# Patient Record
Sex: MALE | Race: WHITE | NOT HISPANIC OR LATINO | Employment: FULL TIME | URBAN - METROPOLITAN AREA
[De-identification: names, ages, dates, MRNs, and addresses within clinical notes are randomized per-mention and may not be internally consistent; named-entity substitution may affect disease eponyms.]

---

## 2017-01-06 ENCOUNTER — OFFICE VISIT (OUTPATIENT)
Dept: FAMILY MEDICINE | Facility: CLINIC | Age: 47
End: 2017-01-06
Payer: COMMERCIAL

## 2017-01-06 ENCOUNTER — TELEPHONE (OUTPATIENT)
Dept: FAMILY MEDICINE | Facility: CLINIC | Age: 47
End: 2017-01-06

## 2017-01-06 VITALS
DIASTOLIC BLOOD PRESSURE: 86 MMHG | HEART RATE: 78 BPM | SYSTOLIC BLOOD PRESSURE: 119 MMHG | OXYGEN SATURATION: 96 % | HEIGHT: 70 IN | TEMPERATURE: 98.2 F | WEIGHT: 266.6 LBS | BODY MASS INDEX: 38.17 KG/M2

## 2017-01-06 DIAGNOSIS — E11.22 TYPE 2 DIABETES MELLITUS WITH STAGE 2 CHRONIC KIDNEY DISEASE, WITHOUT LONG-TERM CURRENT USE OF INSULIN (H): Primary | ICD-10-CM

## 2017-01-06 DIAGNOSIS — E11.9 TYPE 2 DIABETES MELLITUS WITHOUT COMPLICATION, WITHOUT LONG-TERM CURRENT USE OF INSULIN (H): ICD-10-CM

## 2017-01-06 DIAGNOSIS — I10 BENIGN ESSENTIAL HYPERTENSION: ICD-10-CM

## 2017-01-06 DIAGNOSIS — E78.5 HYPERLIPIDEMIA LDL GOAL <100: ICD-10-CM

## 2017-01-06 DIAGNOSIS — N18.2 TYPE 2 DIABETES MELLITUS WITH STAGE 2 CHRONIC KIDNEY DISEASE, WITHOUT LONG-TERM CURRENT USE OF INSULIN (H): Primary | ICD-10-CM

## 2017-01-06 DIAGNOSIS — Z23 NEED FOR PROPHYLACTIC VACCINATION AGAINST STREPTOCOCCUS PNEUMONIAE (PNEUMOCOCCUS): ICD-10-CM

## 2017-01-06 DIAGNOSIS — Z23 NEED FOR PROPHYLACTIC VACCINATION AND INOCULATION AGAINST INFLUENZA: ICD-10-CM

## 2017-01-06 DIAGNOSIS — Z13.89 SCREENING FOR DIABETIC PERIPHERAL NEUROPATHY: ICD-10-CM

## 2017-01-06 LAB
ANION GAP SERPL CALCULATED.3IONS-SCNC: 11 MMOL/L (ref 3–14)
BUN SERPL-MCNC: 18 MG/DL (ref 7–30)
CALCIUM SERPL-MCNC: 9.5 MG/DL (ref 8.5–10.1)
CHLORIDE SERPL-SCNC: 102 MMOL/L (ref 94–109)
CHOLEST SERPL-MCNC: 162 MG/DL
CO2 SERPL-SCNC: 25 MMOL/L (ref 20–32)
CREAT SERPL-MCNC: 1.14 MG/DL (ref 0.66–1.25)
CREAT UR-MCNC: 261 MG/DL
GFR SERPL CREATININE-BSD FRML MDRD: 69 ML/MIN/1.7M2
GLUCOSE SERPL-MCNC: 189 MG/DL (ref 70–99)
HBA1C MFR BLD: 8.5 % (ref 4.3–6)
HDLC SERPL-MCNC: 49 MG/DL
LDLC SERPL CALC-MCNC: 80 MG/DL
MICROALBUMIN UR-MCNC: 14 MG/L
MICROALBUMIN/CREAT UR: 5.17 MG/G CR (ref 0–17)
NONHDLC SERPL-MCNC: 113 MG/DL
POTASSIUM SERPL-SCNC: 4.5 MMOL/L (ref 3.4–5.3)
SODIUM SERPL-SCNC: 138 MMOL/L (ref 133–144)
TRIGL SERPL-MCNC: 165 MG/DL

## 2017-01-06 PROCEDURE — 99207 C FOOT EXAM  NO CHARGE: CPT | Performed by: PHYSICIAN ASSISTANT

## 2017-01-06 PROCEDURE — 83036 HEMOGLOBIN GLYCOSYLATED A1C: CPT | Performed by: PHYSICIAN ASSISTANT

## 2017-01-06 PROCEDURE — 99214 OFFICE O/P EST MOD 30 MIN: CPT | Mod: 25 | Performed by: PHYSICIAN ASSISTANT

## 2017-01-06 PROCEDURE — 90686 IIV4 VACC NO PRSV 0.5 ML IM: CPT | Performed by: PHYSICIAN ASSISTANT

## 2017-01-06 PROCEDURE — 82043 UR ALBUMIN QUANTITATIVE: CPT | Performed by: PHYSICIAN ASSISTANT

## 2017-01-06 PROCEDURE — 90471 IMMUNIZATION ADMIN: CPT | Performed by: PHYSICIAN ASSISTANT

## 2017-01-06 PROCEDURE — 80048 BASIC METABOLIC PNL TOTAL CA: CPT | Performed by: PHYSICIAN ASSISTANT

## 2017-01-06 PROCEDURE — 36415 COLL VENOUS BLD VENIPUNCTURE: CPT | Performed by: PHYSICIAN ASSISTANT

## 2017-01-06 PROCEDURE — 80061 LIPID PANEL: CPT | Performed by: PHYSICIAN ASSISTANT

## 2017-01-06 RX ORDER — ATORVASTATIN CALCIUM 10 MG/1
20 TABLET, FILM COATED ORAL DAILY
Qty: 180 TABLET | Refills: 0 | Status: CANCELLED | OUTPATIENT
Start: 2017-01-06

## 2017-01-06 RX ORDER — GLIPIZIDE 10 MG/1
TABLET ORAL
Qty: 135 TABLET | Refills: 1 | Status: SHIPPED | OUTPATIENT
Start: 2017-01-06 | End: 2017-01-06

## 2017-01-06 RX ORDER — LISINOPRIL 10 MG/1
10 TABLET ORAL DAILY
Qty: 90 TABLET | Refills: 1 | Status: SHIPPED | OUTPATIENT
Start: 2017-01-06 | End: 2017-06-15

## 2017-01-06 RX ORDER — LISINOPRIL 10 MG/1
10 TABLET ORAL DAILY
Qty: 30 TABLET | Refills: 0 | Status: CANCELLED | OUTPATIENT
Start: 2017-01-06

## 2017-01-06 RX ORDER — GLIPIZIDE 10 MG/1
TABLET ORAL
Qty: 135 TABLET | Refills: 1 | Status: SHIPPED | OUTPATIENT
Start: 2017-01-06 | End: 2017-06-27

## 2017-01-06 RX ORDER — GLIPIZIDE 10 MG/1
TABLET ORAL
Qty: 135 TABLET | Refills: 1 | Status: CANCELLED | OUTPATIENT
Start: 2017-01-06

## 2017-01-06 NOTE — MR AVS SNAPSHOT
After Visit Summary   1/6/2017    Paul Ortiz    MRN: [id number]           Patient Information     Date Of Birth          1970        Visit Information        Provider Department      1/6/2017 7:00 AM Dominik Thomas PA-C [facility]        Today's Diagnoses     Type 2 diabetes mellitus with stage 2 chronic kidney disease, without long-term current use of insulin (H)    -  1     Screening for diabetic peripheral neuropathy         Need for prophylactic vaccination and inoculation against influenza         Need for prophylactic vaccination against Streptococcus pneumoniae (pneumococcus)         Benign essential hypertension         Hyperlipidemia LDL goal <100         Type 2 diabetes mellitus without complication, without long-term current use of insulin (H)            Follow-ups after your visit        Who to contact     Normal or non-critical lab and imaging results will be communicated to you by [employer], letter or phone within 4 business days after the clinic has received the results. If you do not hear from us within 7 days, please contact the clinic through [employer] or phone. If you have a critical or abnormal lab result, we will notify you by phone as soon as possible.  Submit refill requests through [employer] or call your pharmacy and they will forward the refill request to us. Please allow 3 business days for your refill to be completed.          If you need to speak with a  for additional information , please call: [phone]             Additional Information About Your Visit        [employer] Information     [employer] gives you secure access to your electronic health record. If you see a primary care provider, you can also send messages to your care team and make appointments. If you have questions, please call your primary care clinic.  If you do not have a primary care provider, please call [phone] and they will assist you.        Care EveryWhere ID   "   This is your Care EveryWhere ID. This could be used by other organizations to access your [facility] medical records  [id number]        Your Vitals Were     Pulse Temperature Height BMI (Body Mass Index) Pulse Oximetry       78 98.2  F (36.8  C) (Oral) 5' 10\" (1.778 m) 38.25 kg/m2 96%        Blood Pressure from Last 3 Encounters:   01/06/17 119/86   06/30/16 108/79   12/29/15 130/80    Weight from Last 3 Encounters:   01/06/17 266 lb 9.6 oz (120.929 kg)   06/30/16 268 lb (121.564 kg)   12/29/15 267 lb 6 oz (121.281 kg)              We Performed the Following     Albumin Random Urine Quantitative     Basic metabolic panel  (Ca, Cl, CO2, Creat, Gluc, K, Na, BUN)     FLU VAC, SPLIT VIRUS IM > 3 YO (QUADRIVALENT) [17194]     FOOT EXAM  NO CHARGE [96470.114]     HEMOGLOBIN A1C     Lipid panel reflex to direct LDL     Vaccine Administration, Initial [54061]          Today's Medication Changes          These changes are accurate as of: 1/6/17  7:47 AM.  If you have any questions, ask your nurse or doctor.               Start taking these medicines.        Dose/Directions    glipiZIDE 10 MG tablet   Commonly known as:  GLUCOTROL   Used for:  Type 2 diabetes mellitus without complication, without long-term current use of insulin (H)   Started by:  Dominik Thomas PA-C        Take 1 tab in the morning and 1 tab prior to supper   Quantity:  135 tablet   Refills:  1       insulin degludec 100 UNIT/ML pen   Commonly known as:  TRESIBA   Used for:  Type 2 diabetes mellitus without complication, without long-term current use of insulin (H)   Started by:  Dominik Thomas PA-C        Dose:  15 Units   Inject 15 Units Subcutaneous daily   Quantity:  15 mL   Refills:  3       insulin pen needle 31G X 6 MM   Used for:  Type 2 diabetes mellitus without complication, without long-term current use of insulin (H)   Started by:  Dominik Thomas PA-C        Use once  daily or as directed.   Quantity:  100 each   Refills:  11    "         Where to get your medicines      These medications were sent to [facility]/pharm[facility] #[id number] - [place], [place] - [address]  [address], [place] [address]     Phone:  [phone]    - dulaglutide 1.5 MG/0.5ML pen  - glipiZIDE 10 MG tablet  - insulin degludec 100 UNIT/ML pen  - insulin pen needle 31G X 6 MM  - lisinopril 10 MG tablet  - metFORMIN 1000 MG tablet      Some of these will need a paper prescription and others can be bought over the counter.  Ask your nurse if you have questions.     Bring a paper prescription for each of these medications    - order for DME             Primary Care Provider Office Phone # Fax #    Dominik Thomas PA-C [phone]       [facility] [address] [facility] [address]        Thank you!     Thank you for choosing [facility]  for your care. Our goal is always to provide you with excellent care. Hearing back from our patients is one way we can continue to improve our services. Please take a few minutes to complete the written survey that you may receive in the mail after your visit with us. Thank you!             Your Updated Medication List - Protect others around you: Learn how to safely use, store and throw away your medicines at www.disposemymeds.org.          This list is accurate as of: 1/6/17  7:47 AM.  Always use your most recent med list.                   Brand Name Dispense Instructions for use    aspirin 81 MG tablet     30 tablet    Take 1 tablet by mouth daily.       atorvastatin 10 MG tablet    LIPITOR    180 tablet    Take 2 tablets (20 mg) by mouth daily       dulaglutide 1.5 MG/0.5ML pen    TRULICITY    6 mL    Inject 1.5 mg Subcutaneous every 7 days       glipiZIDE 10 MG tablet    GLUCOTROL    135 tablet    Take 1 tab in the morning and 1 tab prior to supper       insulin degludec 100 UNIT/ML pen    TRESIBA    15 mL    Inject 15 Units Subcutaneous daily       insulin pen needle 31G X 6 MM      100 each    Use once  daily or as directed.       lisinopril 10 MG tablet    PRINIVIL/ZESTRIL    90 tablet    Take 1 tablet (10 mg) by mouth daily       metFORMIN 1000 MG tablet    GLUCOPHAGE    180 tablet    Take 1 tablet (1,000 mg) by mouth 2 times daily (with meals)       * order for DME     100 each    Lancets.  Daily and prn.       * order for DME     200 each    Test strips for pt's glucometer, brand as covered by insurance Test bid and prn.       * Notice:  This list has 2 medication(s) that are the same as other medications prescribed for you. Read the directions carefully, and ask your doctor or other care provider to review them with you.

## 2017-01-06 NOTE — NURSING NOTE
"Chief Complaint   Patient presents with     Diabetes       Initial /86 mmHg  Pulse 78  Temp(Src) 98.2  F (36.8  C) (Oral)  Ht 5' 10\" (1.778 m)  Wt 266 lb 9.6 oz (120.929 kg)  BMI 38.25 kg/m2  SpO2 96% Estimated body mass index is 38.25 kg/(m^2) as calculated from the following:    Height as of this encounter: 5' 10\" (1.778 m).    Weight as of this encounter: 266 lb 9.6 oz (120.929 kg).  BP completed using cuff size: Hank Garcia MA    "

## 2017-01-06 NOTE — Clinical Note
[facility]  [address] [place] [address] [phone]        January 23, 2017      Paul Ortiz  [address] [place] [address]        Dear Paul,      Your recent cholesterol number and kidney function came back looking good. Make the changes in your diabetes treatment regimen and work on lifestyle changes to improve your blood sugar control and I'll look to see you for a recheck in 2 months or so.    Results for orders placed or performed in visit on 01/06/17   HEMOGLOBIN A1C   Result Value Ref Range    Hemoglobin A1C 8.5 (H) 4.3 - 6.0 %   Lipid panel reflex to direct LDL   Result Value Ref Range    Cholesterol 162 <200 mg/dL    Triglycerides 165 (H) <150 mg/dL    HDL Cholesterol 49 >39 mg/dL    LDL Cholesterol Calculated 80 <100 mg/dL    Non HDL Cholesterol 113 <130 mg/dL   Albumin Random Urine Quantitative   Result Value Ref Range    Creatinine Urine 261 mg/dL    Albumin Urine mg/L 14 mg/L    Albumin Urine mg/g Cr 5.17 0 - 17 mg/g Cr   Basic metabolic panel  (Ca, Cl, CO2, Creat, Gluc, K, Na, BUN)   Result Value Ref Range    Sodium 138 133 - 144 mmol/L    Potassium 4.5 3.4 - 5.3 mmol/L    Chloride 102 94 - 109 mmol/L    Carbon Dioxide 25 20 - 32 mmol/L    Anion Gap 11 3 - 14 mmol/L    Glucose 189 (H) 70 - 99 mg/dL    Urea Nitrogen 18 7 - 30 mg/dL    Creatinine 1.14 0.66 - 1.25 mg/dL    GFR Estimate 69 >60 mL/min/1.7m2    GFR Estimate If Black 83 >60 mL/min/1.7m2    Calcium 9.5 8.5 - 10.1 mg/dL       If you have any questions or concerns, please call myself or my nurse at [phone].    Sincerely,    Dominik Thomas PA-C/keli

## 2017-01-06 NOTE — PROGRESS NOTES
SUBJECTIVE:                                                    Paul Ortiz is a 46 year old male who presents to clinic today for the following health issues:      Diabetes Follow-up    Patient is checking blood sugars: twice daily.  Results are as follows: [id number]         Afternoon- 130-150    Diabetic concerns: None     Symptoms of hypoglycemia (low blood sugar): none     Paresthesias (numbness or burning in feet) or sores: No     Date of last diabetic eye exam: [id number]       Amount of exercise or physical activity: None    Problems taking medications regularly: No    Medication side effects: none    Diet: diabetic    More stress (work related). Less active. Sugars are now running a little higher.    Problem list and histories reviewed & adjusted, as indicated.  Additional history: as documented    Patient Active Problem List   Diagnosis     Gout     Obesity     Health Care Home     Hyperlipidemia LDL goal <100     Type 2 diabetes, HbA1c goal < 7% (H)     Essential hypertension with goal blood pressure less than 140/90     Type 2 diabetes mellitus with stage 2 chronic kidney disease, without long-term current use of insulin (H)     Past Surgical History   Procedure Laterality Date     Hydrocelectomy scrotal  2012     Procedure:HYDROCELECTOMY SCROTAL; Left hydrocelectomy; Surgeon:ROSEANNA BAUTISTA; Location: OR       Social History   Substance Use Topics     Smoking status: Former Smoker -- 1.50 packs/day for 13 years     Types: Cigarettes     Quit date: 1997     Smokeless tobacco: Never Used     Alcohol Use: Yes      Comment: very little     Family History   Problem Relation Age of Onset     DIABETES Father      DIABETES Maternal Grandmother      Hypertension Mother      C.A.D. Maternal Grandfather       at 72, for aortic aneurism and CABGx2         Recent Labs   Lab Test  16   0717  12/29/15   0751  10/24/14   0748  14   1427   13   0729   A1C  7.2*  11.0*  7.0*   7.7*   < >   --    LDL   --   83  99  83   < >  136*   HDL   --   43  47  41   < >  51   TRIG   --   219*  134  103   < >  137   ALT   --    --    --   67   --    --    CR  1.03  0.78  0.98  0.98   --    --    GFRESTIMATED  77  >90  Non  GFR Calc    82  83   --    --    GFRESTBLACK  >90   GFR Calc    >90   GFR Calc    >90   GFR Calc    >90   --    --    POTASSIUM  4.7  4.3  4.7  4.5   --    --    TSH   --   1.78   --    --    --   2.20    < > = values in this interval not displayed.      BP Readings from Last 3 Encounters:   01/06/17 119/86   06/30/16 108/79   12/29/15 130/80    Wt Readings from Last 3 Encounters:   01/06/17 266 lb 9.6 oz (120.929 kg)   06/30/16 268 lb (121.564 kg)   12/29/15 267 lb 6 oz (121.281 kg)                    All other systems negative except as outline above  OBJECTIVE:  Eye exam - right eye normal lid, conjunctiva, cornea, pupil and fundus, left eye normal lid, conjunctiva, cornea, pupil and fundus.  CHEST:chest clear to IPPA, no tachypnea, retractions or cyanosis and S1, S2 normal, no murmur, no gallop, rate regular.  Foot exam - both sides normal; no swelling, tenderness or skin or vascular lesions. Color and temperature is normal. Sensation is intact. Peripheral pulses are palpable. Toenails are normal.  Thyroid not palpable, not enlarged, no nodules detected.    Paul was seen today for diabetes.    Diagnoses and all orders for this visit:    Screening for diabetic peripheral neuropathy  -     HEMOGLOBIN A1C  -     Albumin Random Urine Quantitative    Need for prophylactic vaccination and inoculation against influenza  -     FLU VAC, SPLIT VIRUS IM > 3 YO (QUADRIVALENT) [41029]  -     Vaccine Administration, Initial [85740]    Need for prophylactic vaccination against Streptococcus pneumoniae (pneumococcus)    Type 2 diabetes mellitus with stage 2 chronic kidney disease, without long-term current use of insulin (H)  -      FOOT EXAM  NO CHARGE [90224.114]  -     order for DME; Test strips for pt's glucometer, brand as covered by insurance Test bid and prn.  -     Basic metabolic panel  (Ca, Cl, CO2, Creat, Gluc, K, Na, BUN)  -     dulaglutide (TRULICITY) 1.5 MG/0.5ML pen; Inject 1.5 mg Subcutaneous every 7 days  -     Discontinue: glipiZIDE (GLUCOTROL) 10 MG tablet; Take 1 tab in the morning and 0.5 tab prior to supper    Benign essential hypertension  -     lisinopril (PRINIVIL/ZESTRIL) 10 MG tablet; Take 1 tablet (10 mg) by mouth daily    Hyperlipidemia LDL goal <100    Type 2 diabetes mellitus without complication, without long-term current use of insulin (H)  -     metFORMIN (GLUCOPHAGE) 1000 MG tablet; Take 1 tablet (1,000 mg) by mouth 2 times daily (with meals)  -     insulin degludec (TRESIBA) 100 UNIT/ML pen; Inject 15 Units Subcutaneous daily  -     insulin pen needle 31G X 6 MM; Use once  daily or as directed.  -     glipiZIDE (GLUCOTROL) 10 MG tablet; Take 1 tab in the morning and 1 tab prior to supper      work on lifestyle modification  Recheck in 3 mos.      Injectable Influenza Immunization Documentation    1.  Is the person to be vaccinated sick today?  No    2. Does the person to be vaccinated have an allergy to eggs or to a component of the vaccine?  No    3. Has the person to be vaccinated today ever had a serious reaction to influenza vaccine in the past?  No    4. Has the person to be vaccinated ever had Guillain-Gillespie syndrome?  No     Form completed by tisha toledo

## 2017-01-11 ENCOUNTER — TELEPHONE (OUTPATIENT)
Dept: FAMILY MEDICINE | Facility: CLINIC | Age: 47
End: 2017-01-11

## 2017-01-11 NOTE — TELEPHONE ENCOUNTER
Insurance requires PA for insulin degludec (TRESIBA) 100 UNIT/ML pen. Proceed with PA or change med?

## 2017-01-12 NOTE — TELEPHONE ENCOUNTER
PA sent through cover my meds, waiting for response.   Spoke with insurance med is covered just needs PA

## 2017-01-16 DIAGNOSIS — E11.22 TYPE 2 DIABETES MELLITUS WITH STAGE 2 CHRONIC KIDNEY DISEASE, WITHOUT LONG-TERM CURRENT USE OF INSULIN (H): Primary | ICD-10-CM

## 2017-01-16 DIAGNOSIS — N18.2 TYPE 2 DIABETES MELLITUS WITH STAGE 2 CHRONIC KIDNEY DISEASE, WITHOUT LONG-TERM CURRENT USE OF INSULIN (H): Primary | ICD-10-CM

## 2017-01-16 NOTE — TELEPHONE ENCOUNTER
TRULICITY         Last Written Prescription Date: 12-21-16  Last Fill Quantity: 2, # refills: 1  Last Office Visit with [facility], [facility] or [facility] prescribing provider:  1-6-17        BP Readings from Last 3 Encounters:   01/06/17 119/86   06/30/16 108/79   12/29/15 130/80     MICROL       14   1/6/2017  No results found for this basename: microalbumin  CREATININE   Date Value Ref Range Status   01/06/2017 1.14 0.66 - 1.25 mg/dL Final   ]  GFR ESTIMATE   Date Value Ref Range Status   01/06/2017 69 >60 mL/min/1.7m2 Final     Comment:     Non  GFR Calc   06/30/2016 77 >60 mL/min/1.7m2 Final     Comment:     Non  GFR Calc   12/29/2015 >90  Non  GFR Calc   >60 mL/min/1.7m2 Final     GFR ESTIMATE IF BLACK   Date Value Ref Range Status   01/06/2017 83 >60 mL/min/1.7m2 Final     Comment:      GFR Calc   06/30/2016 >90   GFR Calc   >60 mL/min/1.7m2 Final   12/29/2015 >90   GFR Calc   >60 mL/min/1.7m2 Final     CHOL      162   1/6/2017  HDL       49   1/6/2017  LDL       80   1/6/2017  TRIG      165   1/6/2017  CHOLHDLRATIO      3.7   10/24/2014  No results found for this basename: ast  ALT       67   4/21/2014  A1C      8.5   1/6/2017  A1C      7.2   6/30/2016  A1C     11.0   12/29/2015  A1C      7.0   10/24/2014  A1C      7.7   4/21/2014  POTASSIUM   Date Value Ref Range Status   01/06/2017 4.5 3.4 - 5.3 mmol/L Final

## 2017-01-18 DIAGNOSIS — E78.5 HYPERLIPIDEMIA LDL GOAL <100: Primary | ICD-10-CM

## 2017-01-18 RX ORDER — ATORVASTATIN CALCIUM 10 MG/1
20 TABLET, FILM COATED ORAL DAILY
Qty: 180 TABLET | Refills: 0 | Status: SHIPPED | OUTPATIENT
Start: 2017-01-18 | End: 2017-06-27

## 2017-01-18 NOTE — TELEPHONE ENCOUNTER
ATORVASTATIN     Last Written Prescription Date: 10-24-16  Last Fill Quantity: 180, # refills: 0  Last Office Visit with [facility], P or [facility] prescribing provider: 1-6-17       CHOL      162   1/6/2017  HDL       49   1/6/2017  LDL       80   1/6/2017  TRIG      165   1/6/2017  CHOLHDLRATIO      3.7   10/24/2014

## 2017-01-18 NOTE — TELEPHONE ENCOUNTER
Routing refill request to provider for review/approval because:  Labs out of range:  triglicerides

## 2017-05-26 DIAGNOSIS — E11.22 TYPE 2 DIABETES MELLITUS WITH STAGE 2 CHRONIC KIDNEY DISEASE, WITHOUT LONG-TERM CURRENT USE OF INSULIN (H): ICD-10-CM

## 2017-05-26 DIAGNOSIS — N18.2 TYPE 2 DIABETES MELLITUS WITH STAGE 2 CHRONIC KIDNEY DISEASE, WITHOUT LONG-TERM CURRENT USE OF INSULIN (H): ICD-10-CM

## 2017-05-26 NOTE — LETTER
[facility]  [address] [id number] [phone]      June 1, 2017      Paul Ortiz  [address] [address]        Dear Paul Miller     Your medication has been approved for glipizide.    However, you are due for a follow up appointment for further refills. Please schedule this visit at your earliest convenience.    [facility] Team

## 2017-05-30 NOTE — TELEPHONE ENCOUNTER
Routing refill request to provider for review/approval because:  Patient needs to be seen because:

## 2017-05-30 NOTE — TELEPHONE ENCOUNTER
Glipizide         Last Written Prescription Date: 03/21/17  Last Fill Quantity: 135, # refills: 1  Last Office Visit with [facility], [facility] or [facility] prescribing provider:  01/06/17        BP Readings from Last 3 Encounters:   01/06/17 119/86   06/30/16 108/79   12/29/15 130/80     Lab Results   Component Value Date    MICROL 14 01/06/2017     Lab Results   Component Value Date    UMALCR 5.17 01/06/2017     Creatinine   Date Value Ref Range Status   01/06/2017 1.14 0.66 - 1.25 mg/dL Final   ]  GFR Estimate   Date Value Ref Range Status   01/06/2017 69 >60 mL/min/1.7m2 Final     Comment:     Non  GFR Calc   06/30/2016 77 >60 mL/min/1.7m2 Final     Comment:     Non  GFR Calc   12/29/2015 >90  Non  GFR Calc   >60 mL/min/1.7m2 Final     GFR Estimate If Black   Date Value Ref Range Status   01/06/2017 83 >60 mL/min/1.7m2 Final     Comment:      GFR Calc   06/30/2016 >90   GFR Calc   >60 mL/min/1.7m2 Final   12/29/2015 >90   GFR Calc   >60 mL/min/1.7m2 Final     Lab Results   Component Value Date    CHOL 162 01/06/2017     Lab Results   Component Value Date    HDL 49 01/06/2017     Lab Results   Component Value Date    LDL 80 01/06/2017     Lab Results   Component Value Date    TRIG 165 01/06/2017     Lab Results   Component Value Date    CHOLHDLRATIO 3.7 10/24/2014     No results found for: AST  Lab Results   Component Value Date    ALT 67 04/21/2014     Lab Results   Component Value Date    A1C 8.5 01/06/2017    A1C 7.2 06/30/2016    A1C 11.0 12/29/2015    A1C 7.0 10/24/2014    A1C 7.7 04/21/2014     Potassium   Date Value Ref Range Status   01/06/2017 4.5 3.4 - 5.3 mmol/L Final

## 2017-05-31 RX ORDER — GLIPIZIDE 10 MG/1
TABLET ORAL
Qty: 45 TABLET | Refills: 0 | Status: SHIPPED | OUTPATIENT
Start: 2017-05-31 | End: 2017-06-27

## 2017-06-15 DIAGNOSIS — I10 BENIGN ESSENTIAL HYPERTENSION: ICD-10-CM

## 2017-06-15 NOTE — TELEPHONE ENCOUNTER
Lisinopril      Last Written Prescription Date: 03/20/17  Last Fill Quantity: 90, # refills: 1  Last Office Visit with [facility], P or [facility] prescribing provider: 01/06/17       Potassium   Date Value Ref Range Status   01/06/2017 4.5 3.4 - 5.3 mmol/L Final     Creatinine   Date Value Ref Range Status   01/06/2017 1.14 0.66 - 1.25 mg/dL Final     BP Readings from Last 3 Encounters:   01/06/17 119/86   06/30/16 108/79   12/29/15 130/80

## 2017-06-16 RX ORDER — LISINOPRIL 10 MG/1
TABLET ORAL
Qty: 30 TABLET | Refills: 0 | Status: SHIPPED | OUTPATIENT
Start: 2017-06-16 | End: 2017-06-27

## 2017-06-27 ENCOUNTER — OFFICE VISIT (OUTPATIENT)
Dept: FAMILY MEDICINE | Facility: CLINIC | Age: 47
End: 2017-06-27
Payer: COMMERCIAL

## 2017-06-27 VITALS
RESPIRATION RATE: 16 BRPM | DIASTOLIC BLOOD PRESSURE: 83 MMHG | SYSTOLIC BLOOD PRESSURE: 114 MMHG | HEART RATE: 78 BPM | BODY MASS INDEX: 38.08 KG/M2 | OXYGEN SATURATION: 96 % | WEIGHT: 266 LBS | HEIGHT: 70 IN | TEMPERATURE: 98.2 F

## 2017-06-27 DIAGNOSIS — I10 BENIGN ESSENTIAL HYPERTENSION: ICD-10-CM

## 2017-06-27 DIAGNOSIS — E11.9 TYPE 2 DIABETES MELLITUS WITHOUT COMPLICATION, WITHOUT LONG-TERM CURRENT USE OF INSULIN (H): ICD-10-CM

## 2017-06-27 DIAGNOSIS — E78.5 HYPERLIPIDEMIA LDL GOAL <100: ICD-10-CM

## 2017-06-27 DIAGNOSIS — E11.22 TYPE 2 DIABETES MELLITUS WITH STAGE 2 CHRONIC KIDNEY DISEASE, WITHOUT LONG-TERM CURRENT USE OF INSULIN (H): ICD-10-CM

## 2017-06-27 DIAGNOSIS — N18.2 TYPE 2 DIABETES MELLITUS WITH STAGE 2 CHRONIC KIDNEY DISEASE, WITHOUT LONG-TERM CURRENT USE OF INSULIN (H): ICD-10-CM

## 2017-06-27 LAB
ANION GAP SERPL CALCULATED.3IONS-SCNC: 9 MMOL/L (ref 3–14)
BUN SERPL-MCNC: 12 MG/DL (ref 7–30)
CALCIUM SERPL-MCNC: 9.2 MG/DL (ref 8.5–10.1)
CHLORIDE SERPL-SCNC: 103 MMOL/L (ref 94–109)
CHOLEST SERPL-MCNC: 152 MG/DL
CO2 SERPL-SCNC: 27 MMOL/L (ref 20–32)
CREAT SERPL-MCNC: 0.97 MG/DL (ref 0.66–1.25)
GFR SERPL CREATININE-BSD FRML MDRD: 82 ML/MIN/1.7M2
GLUCOSE SERPL-MCNC: 137 MG/DL (ref 70–99)
HBA1C MFR BLD: 7.9 % (ref 4.3–6)
HDLC SERPL-MCNC: 47 MG/DL
LDLC SERPL CALC-MCNC: 80 MG/DL
NONHDLC SERPL-MCNC: 105 MG/DL
POTASSIUM SERPL-SCNC: 4.6 MMOL/L (ref 3.4–5.3)
SODIUM SERPL-SCNC: 139 MMOL/L (ref 133–144)
TRIGL SERPL-MCNC: 124 MG/DL
TSH SERPL DL<=0.05 MIU/L-ACNC: 1.51 MU/L (ref 0.4–4)

## 2017-06-27 PROCEDURE — 99214 OFFICE O/P EST MOD 30 MIN: CPT | Performed by: PHYSICIAN ASSISTANT

## 2017-06-27 PROCEDURE — 80061 LIPID PANEL: CPT | Performed by: PHYSICIAN ASSISTANT

## 2017-06-27 PROCEDURE — 84443 ASSAY THYROID STIM HORMONE: CPT | Performed by: PHYSICIAN ASSISTANT

## 2017-06-27 PROCEDURE — 99207 C FOOT EXAM  NO CHARGE: CPT | Performed by: PHYSICIAN ASSISTANT

## 2017-06-27 PROCEDURE — 36415 COLL VENOUS BLD VENIPUNCTURE: CPT | Performed by: PHYSICIAN ASSISTANT

## 2017-06-27 PROCEDURE — 83036 HEMOGLOBIN GLYCOSYLATED A1C: CPT | Performed by: PHYSICIAN ASSISTANT

## 2017-06-27 PROCEDURE — 80048 BASIC METABOLIC PNL TOTAL CA: CPT | Performed by: PHYSICIAN ASSISTANT

## 2017-06-27 RX ORDER — INSULIN GLARGINE 100 [IU]/ML
20 INJECTION, SOLUTION SUBCUTANEOUS AT BEDTIME
Qty: 15 ML | Refills: 1 | Status: SHIPPED | OUTPATIENT
Start: 2017-06-27 | End: 2017-10-07

## 2017-06-27 RX ORDER — ATORVASTATIN CALCIUM 10 MG/1
20 TABLET, FILM COATED ORAL DAILY
Qty: 180 TABLET | Refills: 1 | Status: SHIPPED | OUTPATIENT
Start: 2017-06-27 | End: 2018-02-06

## 2017-06-27 RX ORDER — GLIPIZIDE 10 MG/1
TABLET ORAL
Qty: 135 TABLET | Refills: 1 | Status: SHIPPED | OUTPATIENT
Start: 2017-06-27 | End: 2017-10-15

## 2017-06-27 RX ORDER — GLIPIZIDE 10 MG/1
TABLET ORAL
Qty: 90 TABLET | Refills: 1 | Status: CANCELLED | OUTPATIENT
Start: 2017-06-27

## 2017-06-27 RX ORDER — LISINOPRIL 10 MG/1
TABLET ORAL
Qty: 90 TABLET | Refills: 1 | Status: SHIPPED | OUTPATIENT
Start: 2017-06-27 | End: 2018-01-05

## 2017-06-27 RX ORDER — INSULIN GLARGINE 100 [IU]/ML
15 INJECTION, SOLUTION SUBCUTANEOUS AT BEDTIME
Qty: 15 ML | Refills: 1 | Status: CANCELLED | OUTPATIENT
Start: 2017-06-27

## 2017-06-27 RX ORDER — DULAGLUTIDE 1.5 MG/.5ML
INJECTION, SOLUTION SUBCUTANEOUS
Refills: 1 | OUTPATIENT
Start: 2017-06-27

## 2017-06-27 NOTE — TELEPHONE ENCOUNTER
TRULICITY         Last Written Prescription Date: 4-4-17  Last Fill Quantity: ?, # refills: 0  Last Office Visit with [facility], [facility] or [facility] prescribing provider:  1-6-17        BP Readings from Last 3 Encounters:   06/27/17 114/83   01/06/17 119/86   06/30/16 108/79     Lab Results   Component Value Date    MICROL 14 01/06/2017     Lab Results   Component Value Date    UMALCR 5.17 01/06/2017     Creatinine   Date Value Ref Range Status   01/06/2017 1.14 0.66 - 1.25 mg/dL Final   ]  GFR Estimate   Date Value Ref Range Status   01/06/2017 69 >60 mL/min/1.7m2 Final     Comment:     Non  GFR Calc   06/30/2016 77 >60 mL/min/1.7m2 Final     Comment:     Non  GFR Calc   12/29/2015 >90  Non  GFR Calc   >60 mL/min/1.7m2 Final     GFR Estimate If Black   Date Value Ref Range Status   01/06/2017 83 >60 mL/min/1.7m2 Final     Comment:      GFR Calc   06/30/2016 >90   GFR Calc   >60 mL/min/1.7m2 Final   12/29/2015 >90   GFR Calc   >60 mL/min/1.7m2 Final     Lab Results   Component Value Date    CHOL 162 01/06/2017     Lab Results   Component Value Date    HDL 49 01/06/2017     Lab Results   Component Value Date    LDL 80 01/06/2017     Lab Results   Component Value Date    TRIG 165 01/06/2017     Lab Results   Component Value Date    CHOLHDLRATIO 3.7 10/24/2014     No results found for: AST  Lab Results   Component Value Date    ALT 67 04/21/2014     Lab Results   Component Value Date    A1C 8.5 01/06/2017    A1C 7.2 06/30/2016    A1C 11.0 12/29/2015    A1C 7.0 10/24/2014    A1C 7.7 04/21/2014     Potassium   Date Value Ref Range Status   01/06/2017 4.5 3.4 - 5.3 mmol/L Final

## 2017-06-27 NOTE — PROGRESS NOTES
SUBJECTIVE:                                                    Paul Ortiz is a 47 year old male who presents to clinic today for the following health issues:      Diabetes Follow-up    Patient is checking blood sugars: twice daily.    Blood sugar testing frequency justification: n/a  Results are as follows:         am - 140-180         lunchtime - 120-130    Diabetic concerns: None     Symptoms of hypoglycemia (low blood sugar): none     Paresthesias (numbness or burning in feet) or sores: No     Date of last diabetic eye exam: 2016      Amount of exercise or physical activity: None    Problems taking medications regularly: No    Medication side effects: none    Diet: regular (no restrictions)          Problem list and histories reviewed & adjusted, as indicated.  Additional history: as documented        Reviewed and updated as needed this visit by clinical staff       Reviewed and updated as needed this visit by Provider         All other systems negative except as outline above  OBJECTIVE:    CHEST:chest clear to IPPA, no tachypnea, retractions or cyanosis and S1, S2 normal, no murmur, no gallop, rate regular.  Eye exam - right eye normal lid, conjunctiva, cornea, pupil and fundus, left eye normal lid, conjunctiva, cornea, pupil and fundus.  Thyroid not palpable, not enlarged, no nodules detected.  Foot exam - both sides normal; no swelling, tenderness or skin or vascular lesions. Color and temperature is normal. Sensation is intact. Peripheral pulses are palpable. Toenails are normal.    Paul was seen today for diabetes.    Diagnoses and all orders for this visit:    Benign essential hypertension  -     lisinopril (PRINIVIL/ZESTRIL) 10 MG tablet; TAKE 1 TABLET (10 MG) BY MOUTH DAILY  -     Basic metabolic panel  (Ca, Cl, CO2, Creat, Gluc, K, Na, BUN)    Type 2 diabetes mellitus with stage 2 chronic kidney disease, without long-term current use of insulin (H)  -     dulaglutide (TRULICITY) 1.5 MG/0.5ML  pen; Inject 1.5 mg Subcutaneous every 7 days  -     TSH  -     FOOT EXAM  -     Hemoglobin A1c  -     insulin glargine (BASAGLAR KWIKPEN) 100 UNIT/ML injection; Inject 20 Units Subcutaneous At Bedtime    Hyperlipidemia LDL goal <100  -     atorvastatin (LIPITOR) 10 MG tablet; Take 2 tablets (20 mg) by mouth daily  -     Lipid panel reflex to direct LDL    Type 2 diabetes mellitus without complication, without long-term current use of insulin (H)  -     metFORMIN (GLUCOPHAGE) 1000 MG tablet; Take 1 tablet (1,000 mg) by mouth 2 times daily (with meals)  -     glipiZIDE (GLUCOTROL) 10 MG tablet; Take 1 tab in the morning and 1 tab prior to supper    Other orders  -     Cancel: glipiZIDE (GLUCOTROL) 10 MG tablet; TAKE 1 TAB BY MOUTH IN THE MORNING AND 0.5 TAB PRIOR TO SUPPER  -     Cancel: insulin glargine (BASAGLAR KWIKPEN) 100 UNIT/ML injection; Inject 15 Units Subcutaneous At Bedtime  -     Cancel: insulin degludec (TRESIBA) 100 UNIT/ML pen; Inject 15 Units Subcutaneous daily      work on lifestyle modification  Recheck in 3-4 mos.

## 2017-06-27 NOTE — MR AVS SNAPSHOT
After Visit Summary   6/27/2017    Paul Ortiz    MRN: [id number]           Patient Information     Date Of Birth          1970        Visit Information        Provider Department      6/27/2017 7:00 AM Dominik Thomas PA-C [facility]        Today's Diagnoses     Benign essential hypertension        Type 2 diabetes mellitus with stage 2 chronic kidney disease, without long-term current use of insulin (H)        Hyperlipidemia LDL goal <100        Type 2 diabetes mellitus without complication, without long-term current use of insulin (H)           Follow-ups after your visit        Who to contact     Normal or non-critical lab and imaging results will be communicated to you by Electro[employer], letter or phone within 4 business days after the clinic has received the results. If you do not hear from us within 7 days, please contact the clinic through [email or web address] or phone. If you have a critical or abnormal lab result, we will notify you by phone as soon as possible.  Submit refill requests through aitainment or call your pharmacy and they will forward the refill request to us. Please allow 3 business days for your refill to be completed.          If you need to speak with a  for additional information , please call: [phone]             Additional Information About Your Visit        [employer] Information     aitainment gives you secure access to your electronic health record. If you see a primary care provider, you can also send messages to your care team and make appointments. If you have questions, please call your primary care clinic.  If you do not have a primary care provider, please call [phone] and they will assist you.        Care EveryWhere ID     This is your Care EveryWhere ID. This could be used by other organizations to access your [facility] medical records  [id number]        Your Vitals Were     Pulse Temperature Respirations Height Pulse Oximetry BMI (Body  "Mass Index)    78 98.2  F (36.8  C) (Tympanic) 16 5' 10\" (1.778 m) 96% 38.17 kg/m2       Blood Pressure from Last 3 Encounters:   06/27/17 114/83   01/06/17 119/86   06/30/16 108/79    Weight from Last 3 Encounters:   06/27/17 266 lb (120.7 kg)   01/06/17 266 lb 9.6 oz (120.9 kg)   06/30/16 268 lb (121.6 kg)              We Performed the Following     Basic metabolic panel  (Ca, Cl, CO2, Creat, Gluc, K, Na, BUN)     FOOT EXAM     Hemoglobin A1c     Lipid panel reflex to direct LDL     TSH          Today's Medication Changes          These changes are accurate as of: 6/27/17  8:08 AM.  If you have any questions, ask your nurse or doctor.               These medicines have changed or have updated prescriptions.        Dose/Directions    glipiZIDE 10 MG tablet   Commonly known as:  GLUCOTROL   This may have changed:  Another medication with the same name was removed. Continue taking this medication, and follow the directions you see here.   Used for:  Type 2 diabetes mellitus without complication, without long-term current use of insulin (H)   Changed by:  Dominik Thomas PA-C        Take 1 tab in the morning and 1 tab prior to supper   Quantity:  135 tablet   Refills:  1       insulin glargine 100 UNIT/ML injection   This may have changed:  how much to take   Used for:  Type 2 diabetes mellitus with stage 2 chronic kidney disease, without long-term current use of insulin (H)   Changed by:  Dominik Thomas PA-C        Dose:  20 Units   Inject 20 Units Subcutaneous At Bedtime   Quantity:  15 mL   Refills:  1       lisinopril 10 MG tablet   Commonly known as:  PRINIVIL/ZESTRIL   This may have changed:  See the new instructions.   Used for:  Benign essential hypertension   Changed by:  Dominik Thomas PA-C        TAKE 1 TABLET (10 MG) BY MOUTH DAILY   Quantity:  90 tablet   Refills:  1         Stop taking these medicines if you haven't already. Please contact your care team if you have questions.     insulin " degludec 100 UNIT/ML pen   Commonly known as:  TRESIBA   Stopped by:  Dominik Thomas PA-C                Where to get your medicines      These medications were sent to [facility]/pharmacy #[id number] - [place], [place] - [address]  [address], [place] [address]     Phone:  [phone]     atorvastatin 10 MG tablet    glipiZIDE 10 MG tablet    insulin glargine 100 UNIT/ML injection    lisinopril 10 MG tablet    metFORMIN 1000 MG tablet         Call your pharmacy to confirm that your medication is ready for pickup. It may take up to 24 hours for them to receive the prescription. If the prescription is not ready within 3 business days, please contact your clinic or your provider.     We will let you know when these medications are ready. If you don't hear back within 3 business days, please contact us.     dulaglutide 1.5 MG/0.5ML pen                Primary Care Provider Office Phone # Fax #    Dominik Thomas PA-C [phone]       [facility] [address] [facility] [address]        Equal Access to Services     [facility]: Hadii aad ku hadasho Soomaali, waaxda luqadaha, qaybta kaalmada adeegyada, waxay idiin hayaan adeharriett vargasaradmitry maldonado . So [facility] [phone].    ATENCIÓN: Si habla español, tiene a dye disposición servicios gratuitos de asistencia lingüística. [facility] [phone].    We comply with applicable federal civil rights laws and [place] laws. We do not discriminate on the basis of race, color, national origin, age, disability sex, sexual orientation or gender identity.            Thank you!     Thank you for choosing [facility]  for your care. Our goal is always to provide you with excellent care. Hearing back from our patients is one way we can continue to improve our services. Please take a few minutes to complete the written survey that you may receive in the mail after your visit with us. Thank you!             Your Updated  Medication List - Protect others around you: Learn how to safely use, store and throw away your medicines at www.disposemymeds.org.          This list is accurate as of: 6/27/17  8:08 AM.  Always use your most recent med list.                   Brand Name Dispense Instructions for use Diagnosis    aspirin 81 MG tablet     30 tablet    Take 1 tablet by mouth daily.        atorvastatin 10 MG tablet    LIPITOR    180 tablet    Take 2 tablets (20 mg) by mouth daily    Hyperlipidemia LDL goal <100       dulaglutide 1.5 MG/0.5ML pen    TRULICITY    6 mL    Inject 1.5 mg Subcutaneous every 7 days    Type 2 diabetes mellitus with stage 2 chronic kidney disease, without long-term current use of insulin (H)       glipiZIDE 10 MG tablet    GLUCOTROL    135 tablet    Take 1 tab in the morning and 1 tab prior to supper    Type 2 diabetes mellitus without complication, without long-term current use of insulin (H)       insulin glargine 100 UNIT/ML injection     15 mL    Inject 20 Units Subcutaneous At Bedtime    Type 2 diabetes mellitus with stage 2 chronic kidney disease, without long-term current use of insulin (H)       insulin pen needle 31G X 6 MM     100 each    Use once  daily or as directed.    Type 2 diabetes mellitus without complication, without long-term current use of insulin (H)       lisinopril 10 MG tablet    PRINIVIL/ZESTRIL    90 tablet    TAKE 1 TABLET (10 MG) BY MOUTH DAILY    Benign essential hypertension       metFORMIN 1000 MG tablet    GLUCOPHAGE    180 tablet    Take 1 tablet (1,000 mg) by mouth 2 times daily (with meals)    Type 2 diabetes mellitus without complication, without long-term current use of insulin (H)       * order for DME     100 each    Lancets.  Daily and prn.    Type 2 diabetes mellitus without complication (H)       * order for DME     200 each    Test strips for pt's glucometer, brand as covered by insurance Test bid and prn.    Type 2 diabetes mellitus with stage 2 chronic kidney  disease, without long-term current use of insulin (H)       * Notice:  This list has 2 medication(s) that are the same as other medications prescribed for you. Read the directions carefully, and ask your doctor or other care provider to review them with you.

## 2017-06-27 NOTE — LETTER
[facility]  [address]  [place] [id number] [phone]        July 11, 2017      Paul Ortiz  [address] [place] [address]        Paul,     Your cholesterol numbers look wonderful, also, your kidney and thyroid function remain normal. Make changes in your diabetic treatment plan as we discussed and see me in 3 months for a recheck .    Results for orders placed or performed in visit on 06/27/17   Lipid panel reflex to direct LDL   Result Value Ref Range    Cholesterol 152 <200 mg/dL    Triglycerides 124 <150 mg/dL    HDL Cholesterol 47 >39 mg/dL    LDL Cholesterol Calculated 80 <100 mg/dL    Non HDL Cholesterol 105 <130 mg/dL   Basic metabolic panel  (Ca, Cl, CO2, Creat, Gluc, K, Na, BUN)   Result Value Ref Range    Sodium 139 133 - 144 mmol/L    Potassium 4.6 3.4 - 5.3 mmol/L    Chloride 103 94 - 109 mmol/L    Carbon Dioxide 27 20 - 32 mmol/L    Anion Gap 9 3 - 14 mmol/L    Glucose 137 (H) 70 - 99 mg/dL    Urea Nitrogen 12 7 - 30 mg/dL    Creatinine 0.97 0.66 - 1.25 mg/dL    GFR Estimate 82 >60 mL/min/1.7m2    GFR Estimate If Black >90   GFR Calc   >60 mL/min/1.7m2    Calcium 9.2 8.5 - 10.1 mg/dL   TSH   Result Value Ref Range    TSH 1.51 0.40 - 4.00 mU/L   Hemoglobin A1c   Result Value Ref Range    Hemoglobin A1C 7.9 (H) 4.3 - 6.0 %           If you have any questions or concerns, please call myself or my nurse at [phone].    Sincerely,    Dominik Thomas PA-C/brenda

## 2017-06-28 DIAGNOSIS — N18.2 TYPE 2 DIABETES MELLITUS WITH STAGE 2 CHRONIC KIDNEY DISEASE, WITHOUT LONG-TERM CURRENT USE OF INSULIN (H): ICD-10-CM

## 2017-06-28 DIAGNOSIS — E11.22 TYPE 2 DIABETES MELLITUS WITH STAGE 2 CHRONIC KIDNEY DISEASE, WITHOUT LONG-TERM CURRENT USE OF INSULIN (H): ICD-10-CM

## 2017-06-28 RX ORDER — GLIPIZIDE 10 MG/1
TABLET ORAL
Qty: 45 TABLET | Refills: 0 | OUTPATIENT
Start: 2017-06-28

## 2017-06-28 NOTE — TELEPHONE ENCOUNTER
GLIPIZIDE         Last Written Prescription Date: 5-31-17  Last Fill Quantity: 45, # refills: 0  Last Office Visit with [facility] [facility] or [facility] prescribing provider:  6-27-17        BP Readings from Last 3 Encounters:   06/27/17 114/83   01/06/17 119/86   06/30/16 108/79     Lab Results   Component Value Date    MICROL 14 01/06/2017     Lab Results   Component Value Date    UMALCR 5.17 01/06/2017     Creatinine   Date Value Ref Range Status   06/27/2017 0.97 0.66 - 1.25 mg/dL Final   ]  GFR Estimate   Date Value Ref Range Status   06/27/2017 82 >60 mL/min/1.7m2 Final     Comment:     Non  GFR Calc   01/06/2017 69 >60 mL/min/1.7m2 Final     Comment:     Non  GFR Calc   06/30/2016 77 >60 mL/min/1.7m2 Final     Comment:     Non  GFR Calc     GFR Estimate If Black   Date Value Ref Range Status   06/27/2017 >90   GFR Calc   >60 mL/min/1.7m2 Final   01/06/2017 83 >60 mL/min/1.7m2 Final     Comment:      GFR Calc   06/30/2016 >90   GFR Calc   >60 mL/min/1.7m2 Final     Lab Results   Component Value Date    CHOL 152 06/27/2017     Lab Results   Component Value Date    HDL 47 06/27/2017     Lab Results   Component Value Date    LDL 80 06/27/2017     Lab Results   Component Value Date    TRIG 124 06/27/2017     Lab Results   Component Value Date    CHOLHDLRATIO 3.7 10/24/2014     No results found for: AST  Lab Results   Component Value Date    ALT 67 04/21/2014     Lab Results   Component Value Date    A1C 7.9 06/27/2017    A1C 8.5 01/06/2017    A1C 7.2 06/30/2016    A1C 11.0 12/29/2015    A1C 7.0 10/24/2014     Potassium   Date Value Ref Range Status   06/27/2017 4.6 3.4 - 5.3 mmol/L Final

## 2017-07-12 DIAGNOSIS — I10 BENIGN ESSENTIAL HYPERTENSION: ICD-10-CM

## 2017-07-12 RX ORDER — LISINOPRIL 10 MG/1
TABLET ORAL
Qty: 30 TABLET | Refills: 0 | OUTPATIENT
Start: 2017-07-12

## 2017-07-12 NOTE — TELEPHONE ENCOUNTER
LISINOPRIL      Last Written Prescription Date: 6-16-17  Last Fill Quantity: 30, # refills: 0  Last Office Visit with [facility], [facility] or [facility] prescribing provider: 6-27-17       Potassium   Date Value Ref Range Status   06/27/2017 4.6 3.4 - 5.3 mmol/L Final     Creatinine   Date Value Ref Range Status   06/27/2017 0.97 0.66 - 1.25 mg/dL Final     BP Readings from Last 3 Encounters:   06/27/17 114/83   01/06/17 119/86   06/30/16 108/79

## 2017-07-15 DIAGNOSIS — E11.9 TYPE 2 DIABETES MELLITUS WITHOUT COMPLICATION, WITHOUT LONG-TERM CURRENT USE OF INSULIN (H): ICD-10-CM

## 2017-07-17 NOTE — TELEPHONE ENCOUNTER
Metformin         Last Written Prescription Date: 04/17/17  Last Fill Quantity: 180, # refills: 1  Last Office Visit with [facility], [facility] or [facility] prescribing provider:  06/27/17        BP Readings from Last 3 Encounters:   06/27/17 114/83   01/06/17 119/86   06/30/16 108/79     Lab Results   Component Value Date    MICROL 14 01/06/2017     Lab Results   Component Value Date    UMALCR 5.17 01/06/2017     Creatinine   Date Value Ref Range Status   06/27/2017 0.97 0.66 - 1.25 mg/dL Final   ]  GFR Estimate   Date Value Ref Range Status   06/27/2017 82 >60 mL/min/1.7m2 Final     Comment:     Non  GFR Calc   01/06/2017 69 >60 mL/min/1.7m2 Final     Comment:     Non  GFR Calc   06/30/2016 77 >60 mL/min/1.7m2 Final     Comment:     Non  GFR Calc     GFR Estimate If Black   Date Value Ref Range Status   06/27/2017 >90   GFR Calc   >60 mL/min/1.7m2 Final   01/06/2017 83 >60 mL/min/1.7m2 Final     Comment:      GFR Calc   06/30/2016 >90   GFR Calc   >60 mL/min/1.7m2 Final     Lab Results   Component Value Date    CHOL 152 06/27/2017     Lab Results   Component Value Date    HDL 47 06/27/2017     Lab Results   Component Value Date    LDL 80 06/27/2017     Lab Results   Component Value Date    TRIG 124 06/27/2017     Lab Results   Component Value Date    CHOLHDLRATIO 3.7 10/24/2014     No results found for: AST  Lab Results   Component Value Date    ALT 67 04/21/2014     Lab Results   Component Value Date    A1C 7.9 06/27/2017    A1C 8.5 01/06/2017    A1C 7.2 06/30/2016    A1C 11.0 12/29/2015    A1C 7.0 10/24/2014     Potassium   Date Value Ref Range Status   06/27/2017 4.6 3.4 - 5.3 mmol/L Final

## 2017-09-11 DIAGNOSIS — E78.5 HYPERLIPIDEMIA LDL GOAL <100: ICD-10-CM

## 2017-09-11 RX ORDER — ATORVASTATIN CALCIUM 10 MG/1
TABLET, FILM COATED ORAL
Qty: 180 TABLET | Refills: 0 | Status: SHIPPED | OUTPATIENT
Start: 2017-09-11 | End: 2018-02-01

## 2017-09-11 NOTE — TELEPHONE ENCOUNTER
ATORVASTATIN     Last Written Prescription Date: 6-15-17  Last Fill Quantity: 180, # refills: 0  Last Office Visit with [facility], [facility] or [facility] prescribing provider: 6-27-17       Lab Results   Component Value Date    CHOL 152 06/27/2017     Lab Results   Component Value Date    HDL 47 06/27/2017     Lab Results   Component Value Date    LDL 80 06/27/2017     Lab Results   Component Value Date    TRIG 124 06/27/2017     Lab Results   Component Value Date    CHOLHDLRATIO 3.7 10/24/2014

## 2017-10-07 DIAGNOSIS — E11.22 TYPE 2 DIABETES MELLITUS WITH STAGE 2 CHRONIC KIDNEY DISEASE, WITHOUT LONG-TERM CURRENT USE OF INSULIN (H): ICD-10-CM

## 2017-10-07 DIAGNOSIS — N18.2 TYPE 2 DIABETES MELLITUS WITH STAGE 2 CHRONIC KIDNEY DISEASE, WITHOUT LONG-TERM CURRENT USE OF INSULIN (H): ICD-10-CM

## 2017-10-08 NOTE — TELEPHONE ENCOUNTER
Patient is requesting Basaglar    Tammyaglar         Last Written Prescription Date: 08/03/17  Last Fill Quantity: 15, # refills: 0  Last Office Visit with G, P or [facility] prescribing provider:  06/27/17        BP Readings from Last 3 Encounters:   06/27/17 114/83   01/06/17 119/86   06/30/16 108/79     Lab Results   Component Value Date    MICROL 14 01/06/2017     Lab Results   Component Value Date    UMALCR 5.17 01/06/2017     Creatinine   Date Value Ref Range Status   06/27/2017 0.97 0.66 - 1.25 mg/dL Final   ]  GFR Estimate   Date Value Ref Range Status   06/27/2017 82 >60 mL/min/1.7m2 Final     Comment:     Non  GFR Calc   01/06/2017 69 >60 mL/min/1.7m2 Final     Comment:     Non  GFR Calc   06/30/2016 77 >60 mL/min/1.7m2 Final     Comment:     Non  GFR Calc     GFR Estimate If Black   Date Value Ref Range Status   06/27/2017 >90   GFR Calc   >60 mL/min/1.7m2 Final   01/06/2017 83 >60 mL/min/1.7m2 Final     Comment:      GFR Calc   06/30/2016 >90   GFR Calc   >60 mL/min/1.7m2 Final     Lab Results   Component Value Date    CHOL 152 06/27/2017     Lab Results   Component Value Date    HDL 47 06/27/2017     Lab Results   Component Value Date    LDL 80 06/27/2017     Lab Results   Component Value Date    TRIG 124 06/27/2017     Lab Results   Component Value Date    CHOLHDLRATIO 3.7 10/24/2014     No results found for: AST  Lab Results   Component Value Date    ALT 67 04/21/2014     Lab Results   Component Value Date    A1C 7.9 06/27/2017    A1C 8.5 01/06/2017    A1C 7.2 06/30/2016    A1C 11.0 12/29/2015    A1C 7.0 10/24/2014     Potassium   Date Value Ref Range Status   06/27/2017 4.6 3.4 - 5.3 mmol/L Final

## 2017-10-09 RX ORDER — INSULIN GLARGINE 100 [IU]/ML
INJECTION, SOLUTION SUBCUTANEOUS
Qty: 15 ML | Refills: 1 | Status: SHIPPED | OUTPATIENT
Start: 2017-10-09 | End: 2017-12-02

## 2017-10-15 DIAGNOSIS — E11.9 TYPE 2 DIABETES MELLITUS WITHOUT COMPLICATION, WITHOUT LONG-TERM CURRENT USE OF INSULIN (H): ICD-10-CM

## 2017-10-16 RX ORDER — GLIPIZIDE 10 MG/1
TABLET ORAL
Qty: 135 TABLET | Refills: 0 | Status: SHIPPED | OUTPATIENT
Start: 2017-10-16 | End: 2017-12-18

## 2017-12-18 DIAGNOSIS — E11.9 TYPE 2 DIABETES MELLITUS WITHOUT COMPLICATION, WITHOUT LONG-TERM CURRENT USE OF INSULIN (H): ICD-10-CM

## 2017-12-18 NOTE — TELEPHONE ENCOUNTER
Dominik Thomas PA-C   to Be  Pool        6:37 AM   Note      loly is due for a recheck. Have him make an appt to see me.

## 2017-12-19 RX ORDER — GLIPIZIDE 10 MG/1
TABLET ORAL
Qty: 60 TABLET | Refills: 0 | Status: SHIPPED | OUTPATIENT
Start: 2017-12-19 | End: 2018-01-15

## 2018-01-15 DIAGNOSIS — E11.9 TYPE 2 DIABETES MELLITUS WITHOUT COMPLICATION, WITHOUT LONG-TERM CURRENT USE OF INSULIN (H): ICD-10-CM

## 2018-01-15 NOTE — TELEPHONE ENCOUNTER
"Requested Prescriptions   Pending Prescriptions Disp Refills     glipiZIDE (GLUCOTROL) 10 MG tablet [Pharmacy Med Name: GLIPIZIDE 10 MG TABLET] 60 tablet 0    Last Written Prescription Date:  12-19-17  Last Fill Quantity: 60,  # refills: 0   Last Office Visit with G, [facility] or [facility] prescribing provider:  6-27-17   Future Office Visit:    Sig: TAKE 1 TAB IN THE MORNING AND 1 TAB PRIOR TO SUPPER    Sulfonylurea Agents Failed    1/15/2018  1:31 AM       Failed - Patient's BP is less than 140/90    BP Readings from Last 3 Encounters:   06/27/17 114/83   01/06/17 119/86   06/30/16 108/79                Failed - Patient has had a Microalbumin in the past 12 mos.    Recent Labs   Lab Test  01/06/17   0719   MICROL  14   UMALCR  5.17            Failed - Patient has documented A1c within the specified period of time.    Recent Labs   Lab Test  06/27/17   0743   A1C  7.9*            Failed - Patient has had an appointment with authorizing provider within the past 6 mos. or  within next 30 days    Patient had office visit in the last 6 months or has a visit in the next 30 days with authorizing provider.  See \"Patient Info\" tab in inbasket, or \"Choose Columns\" in Meds & Orders section of the refill encounter.          Passed - Patient has documented LDL within the past 12 mos.    Recent Labs   Lab Test  06/27/17   0743   LDL  80            Passed - Patient is age 18 or older       Passed - Patient has a recent creatinine (normal) within the past 12 mos.    Recent Labs   Lab Test  06/27/17   0743   CR  0.97             "

## 2018-01-15 NOTE — TELEPHONE ENCOUNTER
Routing refill request to provider for review/approval because:  Patient needs to be seen because:  Was to be seen in Sept. Reminder given.  Pended for completion and approval.  June Garcia RN

## 2018-01-16 RX ORDER — GLIPIZIDE 10 MG/1
TABLET ORAL
Qty: 40 TABLET | Refills: 0 | Status: SHIPPED | OUTPATIENT
Start: 2018-01-16 | End: 2018-02-06

## 2018-02-01 DIAGNOSIS — E78.5 HYPERLIPIDEMIA LDL GOAL <100: ICD-10-CM

## 2018-02-01 RX ORDER — ATORVASTATIN CALCIUM 10 MG/1
TABLET, FILM COATED ORAL
Qty: 180 TABLET | Refills: 0 | Status: SHIPPED | OUTPATIENT
Start: 2018-02-01 | End: 2018-02-06

## 2018-02-01 NOTE — TELEPHONE ENCOUNTER
"Requested Prescriptions   Pending Prescriptions Disp Refills     atorvastatin (LIPITOR) 10 MG tablet [Pharmacy Med Name: ATORVASTATIN 10 MG TABLET] 180 tablet 0    Last Written Prescription Date:  10-27-17  Last Fill Quantity: 180,  # refills: 0   Last Office Visit with [facility] provider:  6-27-17   Future Office Visit:    Next 5 appointments (look out 90 days)     Feb 06, 2018  7:40 AM CST   [facility]Chart Long with Dominik Thomas PA-C   [facility]    [address] [address]   [phone]                Sig: TAKE 2 TABLETS (20 MG) BY MOUTH DAILY    Statins Protocol Passed    2/1/2018 12:44 AM       Passed - LDL on file in past 12 months    Recent Labs   Lab Test  06/27/17   0743   LDL  80            Passed - No abnormal creatine kinase in past 12 months    No lab results found.         Passed - Recent or future visit with authorizing provider    Patient had office visit in the last year or has a visit in the next 30 days with authorizing provider.  See \"Patient Info\" tab in inbasket, or \"Choose Columns\" in Meds & Orders section of the refill encounter.            Passed - Patient is age 18 or older        "

## 2018-02-06 ENCOUNTER — OFFICE VISIT (OUTPATIENT)
Dept: FAMILY MEDICINE | Facility: CLINIC | Age: 48
End: 2018-02-06
Payer: COMMERCIAL

## 2018-02-06 VITALS
WEIGHT: 268 LBS | TEMPERATURE: 97.8 F | HEART RATE: 74 BPM | BODY MASS INDEX: 38.37 KG/M2 | HEIGHT: 70 IN | DIASTOLIC BLOOD PRESSURE: 80 MMHG | SYSTOLIC BLOOD PRESSURE: 112 MMHG | OXYGEN SATURATION: 96 %

## 2018-02-06 DIAGNOSIS — N18.2 TYPE 2 DIABETES MELLITUS WITH STAGE 2 CHRONIC KIDNEY DISEASE, WITHOUT LONG-TERM CURRENT USE OF INSULIN (H): ICD-10-CM

## 2018-02-06 DIAGNOSIS — E78.5 HYPERLIPIDEMIA LDL GOAL <100: ICD-10-CM

## 2018-02-06 DIAGNOSIS — I10 ESSENTIAL HYPERTENSION WITH GOAL BLOOD PRESSURE LESS THAN 140/90: Primary | ICD-10-CM

## 2018-02-06 DIAGNOSIS — E11.22 TYPE 2 DIABETES MELLITUS WITH STAGE 2 CHRONIC KIDNEY DISEASE, WITHOUT LONG-TERM CURRENT USE OF INSULIN (H): ICD-10-CM

## 2018-02-06 DIAGNOSIS — I10 BENIGN ESSENTIAL HYPERTENSION: ICD-10-CM

## 2018-02-06 DIAGNOSIS — E11.9 TYPE 2 DIABETES MELLITUS WITHOUT COMPLICATION, WITHOUT LONG-TERM CURRENT USE OF INSULIN (H): ICD-10-CM

## 2018-02-06 PROBLEM — E66.01 MORBID OBESITY (H): Status: ACTIVE | Noted: 2018-02-06

## 2018-02-06 PROBLEM — E66.01 MORBID OBESITY (H): Status: RESOLVED | Noted: 2018-02-06 | Resolved: 2018-02-06

## 2018-02-06 LAB
ANION GAP SERPL CALCULATED.3IONS-SCNC: 8 MMOL/L (ref 3–14)
BUN SERPL-MCNC: 14 MG/DL (ref 7–30)
CALCIUM SERPL-MCNC: 9.5 MG/DL (ref 8.5–10.1)
CHLORIDE SERPL-SCNC: 102 MMOL/L (ref 94–109)
CHOLEST SERPL-MCNC: 154 MG/DL
CO2 SERPL-SCNC: 26 MMOL/L (ref 20–32)
CREAT SERPL-MCNC: 0.87 MG/DL (ref 0.66–1.25)
CREAT UR-MCNC: 244 MG/DL
GFR SERPL CREATININE-BSD FRML MDRD: >90 ML/MIN/1.7M2
GLUCOSE SERPL-MCNC: 142 MG/DL (ref 70–99)
HBA1C MFR BLD: 8.5 % (ref 4.3–6)
HDLC SERPL-MCNC: 58 MG/DL
LDLC SERPL CALC-MCNC: 69 MG/DL
MICROALBUMIN UR-MCNC: 11 MG/L
MICROALBUMIN/CREAT UR: 4.55 MG/G CR (ref 0–17)
NONHDLC SERPL-MCNC: 96 MG/DL
POTASSIUM SERPL-SCNC: 4.9 MMOL/L (ref 3.4–5.3)
SODIUM SERPL-SCNC: 136 MMOL/L (ref 133–144)
TRIGL SERPL-MCNC: 133 MG/DL

## 2018-02-06 PROCEDURE — 36415 COLL VENOUS BLD VENIPUNCTURE: CPT | Performed by: PHYSICIAN ASSISTANT

## 2018-02-06 PROCEDURE — 99214 OFFICE O/P EST MOD 30 MIN: CPT | Performed by: PHYSICIAN ASSISTANT

## 2018-02-06 PROCEDURE — 80061 LIPID PANEL: CPT | Performed by: PHYSICIAN ASSISTANT

## 2018-02-06 PROCEDURE — 99207 C FOOT EXAM  NO CHARGE: CPT | Performed by: PHYSICIAN ASSISTANT

## 2018-02-06 PROCEDURE — 83036 HEMOGLOBIN GLYCOSYLATED A1C: CPT | Performed by: PHYSICIAN ASSISTANT

## 2018-02-06 PROCEDURE — 80048 BASIC METABOLIC PNL TOTAL CA: CPT | Performed by: PHYSICIAN ASSISTANT

## 2018-02-06 PROCEDURE — 82043 UR ALBUMIN QUANTITATIVE: CPT | Performed by: PHYSICIAN ASSISTANT

## 2018-02-06 RX ORDER — ATORVASTATIN CALCIUM 10 MG/1
20 TABLET, FILM COATED ORAL DAILY
Qty: 180 TABLET | Refills: 1 | Status: SHIPPED | OUTPATIENT
Start: 2018-02-06 | End: 2018-06-19

## 2018-02-06 RX ORDER — INSULIN GLARGINE 100 [IU]/ML
INJECTION, SOLUTION SUBCUTANEOUS
Qty: 15 ML | Refills: 1 | Status: SHIPPED | OUTPATIENT
Start: 2018-02-06 | End: 2018-04-14

## 2018-02-06 RX ORDER — INSULIN GLARGINE 100 [IU]/ML
INJECTION, SOLUTION SUBCUTANEOUS
Qty: 15 ML | Refills: 1 | Status: SHIPPED | OUTPATIENT
Start: 2018-02-06 | End: 2018-02-06

## 2018-02-06 RX ORDER — GLIPIZIDE 10 MG/1
TABLET ORAL
Qty: 90 TABLET | Refills: 1 | Status: SHIPPED | OUTPATIENT
Start: 2018-02-06 | End: 2018-05-25

## 2018-02-06 RX ORDER — LISINOPRIL 10 MG/1
TABLET ORAL
Qty: 90 TABLET | Refills: 1 | Status: SHIPPED | OUTPATIENT
Start: 2018-02-06 | End: 2018-06-19

## 2018-02-06 NOTE — PROGRESS NOTES
SUBJECTIVE:   Paul Ortiz is a 47 year old male who presents to clinic today for the following health issues:      Diabetes Follow-up    Patient is checking blood sugars: once daily.  Results are as follows:         140-150    Diabetic concerns: None     Symptoms of hypoglycemia (low blood sugar): none     Paresthesias (numbness or burning in feet) or sores: No     Date of last diabetic eye exam: 2017    Hyperlipidemia Follow-Up      Rate your low fat/cholesterol diet?: fair    Taking statin?  Yes, no muscle aches from statin    Other lipid medications/supplements?:  none    Hypertension Follow-up      Outpatient blood pressures are not being checked.    Low Salt Diet: not monitoring salt    BP Readings from Last 2 Encounters:   02/06/18 112/80   06/27/17 114/83     Hemoglobin A1C (%)   Date Value   06/27/2017 7.9 (H)   01/06/2017 8.5 (H)     LDL Cholesterol Calculated (mg/dL)   Date Value   06/27/2017 80   01/06/2017 80       Amount of exercise or physical activity: 2 days weekly    Problems taking medications regularly: No    Medication side effects: none    Diet: low fat/cholesterol            Problem list and histories reviewed & adjusted, as indicated.  Additional history: as documented    BP Readings from Last 3 Encounters:   02/06/18 112/80   06/27/17 114/83   01/06/17 119/86    Wt Readings from Last 3 Encounters:   02/06/18 268 lb (121.6 kg)   06/27/17 266 lb (120.7 kg)   01/06/17 266 lb 9.6 oz (120.9 kg)                    Reviewed and updated as needed this visit by clinical staff  Tobacco  Allergies  Meds       Reviewed and updated as needed this visit by Provider         All other systems negative except as outline above  OBJECTIVE:    Eye exam - right eye normal lid, conjunctiva, cornea, pupil and fundus, left eye normal lid, conjunctiva, cornea, pupil and fundus.  Thyroid not palpable, not enlarged, no nodules detected.  CHEST:chest clear to IPPA, no tachypnea, retractions or cyanosis and S1,  S2 normal, no murmur, no gallop, rate regular.  Foot exam - both sides normal; no swelling, tenderness or skin or vascular lesions. Color and temperature is normal. Sensation is intact. Peripheral pulses are palpable. Toenails are normal.    Paul was seen today for diabetes.    Diagnoses and all orders for this visit:    Essential hypertension with goal blood pressure less than 140/90  -     Basic metabolic panel  (Ca, Cl, CO2, Creat, Gluc, K, Na, BUN)    Type 2 diabetes mellitus without complication, without long-term current use of insulin (H)  -     glipiZIDE (GLUCOTROL) 10 MG tablet; TAKE 1 TAB IN THE MORNING AND 1 TAB PRIOR TO SUPPER  -     Albumin Random Urine Quantitative with Creat Ratio  -     Hemoglobin A1c  -     metFORMIN (GLUCOPHAGE) 1000 MG tablet; TAKE 1 TABLET (1,000 MG) BY MOUTH 2 TIMES DAILY (WITH MEALS)  -     insulin pen needle 31G X 6 MM; Use once  daily or as directed.  -     FOOT EXAM    Hyperlipidemia LDL goal <100  -     atorvastatin (LIPITOR) 10 MG tablet; Take 2 tablets (20 mg) by mouth daily  -     Lipid panel reflex to direct LDL Fasting    Benign essential hypertension  -     lisinopril (PRINIVIL/ZESTRIL) 10 MG tablet; TAKE 1 TABLET (10 MG) BY MOUTH DAILY    Type 2 diabetes mellitus with stage 2 chronic kidney disease, without long-term current use of insulin (H)  -     Discontinue: BASAGLAR 100 UNIT/ML injection; INJECT 20 UNITS SUBCUTANEOUS AT BEDTIME  -     dulaglutide (TRULICITY) 1.5 MG/0.5ML pen; Inject 1.5 mg Subcutaneous every 7 days  -     order for DME; Glucometer: Accu-chek Guide  -     order for DME; Test strips for pt's glucometer: Test bid and prn.  -     BASAGLAR 100 UNIT/ML injection; INJECT 40 UNITS SUBCUTANEOUS AT BEDTIME    Class 2 obesity with serious comorbidity and body mass index (BMI) of 38.0 to 38.9 in adult, unspecified obesity type    Other orders  -     Cancel: TSH  -     Cancel: Lipid panel reflex to direct LDL Fasting      work on lifestyle  modification  Recheck in 3 mos.

## 2018-02-06 NOTE — MR AVS SNAPSHOT
After Visit Summary   2/6/2018    Paul Ortiz    MRN: [id number]           Patient Information     Date Of Birth          1970        Visit Information        Provider Department      2/6/2018 7:40 AM Dominik Thomas PA-C [facility]        Today's Diagnoses     Essential hypertension with goal blood pressure less than 140/90    -  1    Type 2 diabetes mellitus without complication, without long-term current use of insulin (H)        Hyperlipidemia LDL goal <100        Benign essential hypertension        Type 2 diabetes mellitus with stage 2 chronic kidney disease, without long-term current use of insulin (H)        Class 2 obesity with serious comorbidity and body mass index (BMI) of 38.0 to 38.9 in adult, unspecified obesity type           Follow-ups after your visit        Who to contact     Normal or non-critical lab and imaging results will be communicated to you by [employer], letter or phone within 4 business days after the clinic has received the results. If you do not hear from us within 7 days, please contact the clinic through [employer] or phone. If you have a critical or abnormal lab result, we will notify you by phone as soon as possible.  Submit refill requests through [employer] or call your pharmacy and they will forward the refill request to us. Please allow 3 business days for your refill to be completed.          If you need to speak with a  for additional information , please call: [phone]             Additional Information About Your Visit        [employer] [facility] Information     [employer] gives you secure access to your electronic health record. If you see a primary care provider, you can also send messages to your care team and make appointments. If you have questions, please call your primary care clinic.  If you do not have a primary care provider, please call [phone] and they will assist you.        Care EveryWhere ID     This is your Care  "EveryWhere ID. This could be used by other organizations to access your [facility] medical records  [id number]        Your Vitals Were     Pulse Temperature Height Pulse Oximetry BMI (Body Mass Index)       74 97.8  F (36.6  C) (Tympanic) 5' 10\" (1.778 m) 96% 38.45 kg/m2        Blood Pressure from Last 3 Encounters:   02/06/18 112/80   06/27/17 114/83   01/06/17 119/86    Weight from Last 3 Encounters:   02/06/18 268 lb (121.6 kg)   06/27/17 266 lb (120.7 kg)   01/06/17 266 lb 9.6 oz (120.9 kg)              We Performed the Following     Albumin Random Urine Quantitative with Creat Ratio     Basic metabolic panel  (Ca, Cl, CO2, Creat, Gluc, K, Na, BUN)     FOOT EXAM     Hemoglobin A1c     Lipid panel reflex to direct LDL Fasting          Today's Medication Changes          These changes are accurate as of 2/6/18  8:44 AM.  If you have any questions, ask your nurse or doctor.               Start taking these medicines.        Dose/Directions    BASAGLAR 100 UNIT/ML injection   Used for:  Type 2 diabetes mellitus with stage 2 chronic kidney disease, without long-term current use of insulin (H)   Started by:  Dominik Thomas PA-C        INJECT 40 UNITS SUBCUTANEOUS AT BEDTIME   Quantity:  15 mL   Refills:  1         These medicines have changed or have updated prescriptions.        Dose/Directions    atorvastatin 10 MG tablet   Commonly known as:  LIPITOR   This may have changed:  Another medication with the same name was removed. Continue taking this medication, and follow the directions you see here.   Used for:  Hyperlipidemia LDL goal <100   Changed by:  Dominik Thomas PA-C        Dose:  20 mg   Take 2 tablets (20 mg) by mouth daily   Quantity:  180 tablet   Refills:  1       glipiZIDE 10 MG tablet   Commonly known as:  GLUCOTROL   This may have changed:  See the new instructions.   Used for:  Type 2 diabetes mellitus without complication, without long-term current use of insulin (H)   Changed by:  Martha, " Dominik Kulkarni PA-C        TAKE 1 TAB IN THE MORNING AND 1 TAB PRIOR TO SUPPER   Quantity:  90 tablet   Refills:  1       lisinopril 10 MG tablet   Commonly known as:  PRINIVIL/ZESTRIL   This may have changed:  See the new instructions.   Used for:  Benign essential hypertension   Changed by:  Dominik Thomas PA-C        TAKE 1 TABLET (10 MG) BY MOUTH DAILY   Quantity:  90 tablet   Refills:  1       metFORMIN 1000 MG tablet   Commonly known as:  GLUCOPHAGE   This may have changed:  See the new instructions.   Used for:  Type 2 diabetes mellitus without complication, without long-term current use of insulin (H)   Changed by:  Dominik Thomas PA-C        TAKE 1 TABLET (1,000 MG) BY MOUTH 2 TIMES DAILY (WITH MEALS)   Quantity:  180 tablet   Refills:  1       * order for DME   This may have changed:  Another medication with the same name was added. Make sure you understand how and when to take each.   Used for:  Type 2 diabetes mellitus without complication (H)   Changed by:  Dominik Thomas PA-C        Lancets.  Daily and prn.   Quantity:  100 each   Refills:  4       * order for DME   This may have changed:  Another medication with the same name was added. Make sure you understand how and when to take each.   Used for:  Type 2 diabetes mellitus with stage 2 chronic kidney disease, without long-term current use of insulin (H)   Changed by:  Dominik Thomas PA-C        Test strips for pt's glucometer, brand as covered by insurance Test bid and prn.   Quantity:  200 each   Refills:  4       * order for DME   This may have changed:  You were already taking a medication with the same name, and this prescription was added. Make sure you understand how and when to take each.   Used for:  Type 2 diabetes mellitus with stage 2 chronic kidney disease, without long-term current use of insulin (H)   Changed by:  Dominik Thomas PA-C        Glucometer: Accu-chek Guide   Quantity:  1 each   Refills:  0       * order for  DME   This may have changed:  You were already taking a medication with the same name, and this prescription was added. Make sure you understand how and when to take each.   Used for:  Type 2 diabetes mellitus with stage 2 chronic kidney disease, without long-term current use of insulin (H)   Changed by:  Dominik Thomas PA-C        Test strips for pt's glucometer: Test bid and prn.   Quantity:  200 each   Refills:  4       * Notice:  This list has 4 medication(s) that are the same as other medications prescribed for you. Read the directions carefully, and ask your doctor or other care provider to review them with you.         Where to get your medicines      These medications were sent to [facility]/pharmacy #[id number] - [place], [place] - [address] [place]  [address] , [place] [address]     Phone:  [phone]     atorvastatin 10 MG tablet    BASAGLAR 100 UNIT/ML injection    dulaglutide 1.5 MG/0.5ML pen    glipiZIDE 10 MG tablet    insulin pen needle 31G X 6 MM    lisinopril 10 MG tablet    metFORMIN 1000 MG tablet         Some of these will need a paper prescription and others can be bought over the counter.  Ask your nurse if you have questions.     Bring a paper prescription for each of these medications     order for DME    order for DME                Primary Care Provider Office Phone # Fax #    Dominik Thomas PA-C [phone]       [address] [place] [address]        Equal Access to Services     [facility] : Hadii aad ku hadasho Soomaali, waaxda luqadaha, qaybta kaalmada adeegyada, waxay letyin hayrupa maldonado . So [facility] [phone].    ATENCIÓN: Si habla español, tiene a dye disposición servicios gratuitos de asistencia lingüística. Llame al [phone].    We comply with applicable federal civil rights laws and [place] laws. We do not discriminate on the basis of race, color, national origin, age, disability, sex, sexual orientation, or gender  identity.            Thank you!     Thank you for choosing [facility]  for your care. Our goal is always to provide you with excellent care. Hearing back from our patients is one way we can continue to improve our services. Please take a few minutes to complete the written survey that you may receive in the mail after your visit with us. Thank you!             Your Updated Medication List - Protect others around you: Learn how to safely use, store and throw away your medicines at www.disposemymeds.org.          This list is accurate as of 2/6/18  8:44 AM.  Always use your most recent med list.                   Brand Name Dispense Instructions for use Diagnosis    aspirin 81 MG tablet     30 tablet    Take 1 tablet by mouth daily.        atorvastatin 10 MG tablet    LIPITOR    180 tablet    Take 2 tablets (20 mg) by mouth daily    Hyperlipidemia LDL goal <100       BASAGLAR 100 UNIT/ML injection     15 mL    INJECT 40 UNITS SUBCUTANEOUS AT BEDTIME    Type 2 diabetes mellitus with stage 2 chronic kidney disease, without long-term current use of insulin (H)       dulaglutide 1.5 MG/0.5ML pen    TRULICITY    6 mL    Inject 1.5 mg Subcutaneous every 7 days    Type 2 diabetes mellitus with stage 2 chronic kidney disease, without long-term current use of insulin (H)       glipiZIDE 10 MG tablet    GLUCOTROL    90 tablet    TAKE 1 TAB IN THE MORNING AND 1 TAB PRIOR TO SUPPER    Type 2 diabetes mellitus without complication, without long-term current use of insulin (H)       insulin pen needle 31G X 6 MM     100 each    Use once  daily or as directed.    Type 2 diabetes mellitus without complication, without long-term current use of insulin (H)       lisinopril 10 MG tablet    PRINIVIL/ZESTRIL    90 tablet    TAKE 1 TABLET (10 MG) BY MOUTH DAILY    Benign essential hypertension       metFORMIN 1000 MG tablet    GLUCOPHAGE    180 tablet    TAKE 1 TABLET (1,000 MG) BY MOUTH 2 TIMES DAILY (WITH MEALS)    Type 2  diabetes mellitus without complication, without long-term current use of insulin (H)       * order for DME     100 each    Lancets.  Daily and prn.    Type 2 diabetes mellitus without complication (H)       * order for DME     200 each    Test strips for pt's glucometer, brand as covered by insurance Test bid and prn.    Type 2 diabetes mellitus with stage 2 chronic kidney disease, without long-term current use of insulin (H)       * order for DME     1 each    Glucometer: Accu-chek Guide    Type 2 diabetes mellitus with stage 2 chronic kidney disease, without long-term current use of insulin (H)       * order for DME     200 each    Test strips for pt's glucometer: Test bid and prn.    Type 2 diabetes mellitus with stage 2 chronic kidney disease, without long-term current use of insulin (H)       * Notice:  This list has 4 medication(s) that are the same as other medications prescribed for you. Read the directions carefully, and ask your doctor or other care provider to review them with you.

## 2018-02-11 DIAGNOSIS — I10 BENIGN ESSENTIAL HYPERTENSION: ICD-10-CM

## 2018-02-12 RX ORDER — LISINOPRIL 10 MG/1
TABLET ORAL
Qty: 30 TABLET | Refills: 0 | OUTPATIENT
Start: 2018-02-12

## 2018-02-12 NOTE — TELEPHONE ENCOUNTER
"Requested Prescriptions   Pending Prescriptions Disp Refills     lisinopril (PRINIVIL/ZESTRIL) 10 MG tablet [Pharmacy Med Name: LISINOPRIL 10 MG TABLET] 30 tablet 0    Last Written Prescription Date:  1-6-18  Last Fill Quantity: 30,  # refills: 0   Last office visit: 2/6/2018 with prescribing provider:  6-27-17   Future Office Visit:   Sig: TAKE 1 TABLET (10 MG) BY MOUTH DAILY    ACE Inhibitors (Including Combos) Protocol Passed    2/11/2018  8:35 AM       Passed - Blood pressure under 140/90    BP Readings from Last 3 Encounters:   02/06/18 112/80   06/27/17 114/83   01/06/17 119/86                Passed - Recent or future visit with authorizing provider's specialty    Patient had office visit in the last year or has a visit in the next 30 days with authorizing provider.  See \"Patient Info\" tab in inbasket, or \"Choose Columns\" in Meds & Orders section of the refill encounter.            Passed - Patient is age 18 or older       Passed - Normal serum creatinine on file in past 12 months    Recent Labs   Lab Test  02/06/18   0817   CR  0.87            Passed - Normal serum potassium on file in past 12 months    Recent Labs   Lab Test  02/06/18   0817   POTASSIUM  4.9             "

## 2018-02-25 DIAGNOSIS — N18.2 TYPE 2 DIABETES MELLITUS WITH STAGE 2 CHRONIC KIDNEY DISEASE, WITHOUT LONG-TERM CURRENT USE OF INSULIN (H): Primary | ICD-10-CM

## 2018-02-25 DIAGNOSIS — E11.22 TYPE 2 DIABETES MELLITUS WITH STAGE 2 CHRONIC KIDNEY DISEASE, WITHOUT LONG-TERM CURRENT USE OF INSULIN (H): Primary | ICD-10-CM

## 2018-02-26 RX ORDER — LANCETS
EACH MISCELLANEOUS
Qty: 100 EACH | Refills: 0 | Status: SHIPPED | OUTPATIENT
Start: 2018-02-26 | End: 2018-06-19

## 2018-02-26 NOTE — TELEPHONE ENCOUNTER
"Requested Prescriptions   Pending Prescriptions Disp Refills     blood glucose monitoring (ACCU-CHEK FASTCLIX) lancets [Pharmacy Med Name: ACCU-CHEK FASTCLIX LANCETS] 100 each 0    Last Written Prescription Date:  2-25-18  Last Fill Quantity: 100,  # refills: 0   Last office visit: 2/6/2018 with prescribing provider:  2-6-18   Future Office Visit:   Sig: TEST DAILY AND AS NEEDED    Diabetic Supplies Protocol Passed    2/25/2018  5:29 PM       Passed - Patient is 18 years of age or older       Passed - Patient has had appt within past 6 mos    Patient had office visit in the last 6 months or has a visit in the next 30 days with authorizing provider.  See \"Patient Info\" tab in inbasket, or \"Choose Columns\" in Meds & Orders section of the refill encounter.            "

## 2018-02-26 NOTE — TELEPHONE ENCOUNTER
Prescription approved per [facility] Refill Protocol.  June Garcia RN

## 2018-04-14 DIAGNOSIS — E11.22 TYPE 2 DIABETES MELLITUS WITH STAGE 2 CHRONIC KIDNEY DISEASE, WITHOUT LONG-TERM CURRENT USE OF INSULIN (H): ICD-10-CM

## 2018-04-14 DIAGNOSIS — N18.2 TYPE 2 DIABETES MELLITUS WITH STAGE 2 CHRONIC KIDNEY DISEASE, WITHOUT LONG-TERM CURRENT USE OF INSULIN (H): ICD-10-CM

## 2018-04-16 RX ORDER — INSULIN GLARGINE 100 [IU]/ML
INJECTION, SOLUTION SUBCUTANEOUS
Qty: 15 ML | Refills: 0 | Status: SHIPPED | OUTPATIENT
Start: 2018-04-16 | End: 2018-05-20

## 2018-04-16 NOTE — TELEPHONE ENCOUNTER
"Requested Prescriptions   Pending Prescriptions Disp Refills     BASAGLAR 100 UNIT/ML injection [Pharmacy Med Name: BASAGLAR 100 UNIT/ML KWIKPEN]  1    Last Written Prescription Date:  03/16/16  Last Fill Quantity: 15,  # refills: 1   Last office visit: 2/6/2018 with prescribing provider:  SUZANNE Thomas Future Office Visit:     Sig: INJECT 40 UNITS SUBCUTANEOUS AT BEDTIME    Long Acting Insulin Protocol Passed    4/14/2018 11:48 AM       Passed - Blood pressure less than 140/90 in past 6 months    BP Readings from Last 3 Encounters:   02/06/18 112/80   06/27/17 114/83   01/06/17 119/86                Passed - LDL on file in past 12 months    Recent Labs   Lab Test  02/06/18   0817   LDL  69            Passed - Microalbumin on file in past 12 months    Recent Labs   Lab Test  02/06/18   0817   MICROL  11   UMALCR  4.55            Passed - Serum creatinine on file in past 12 months    Recent Labs   Lab Test  02/06/18   0817   CR  0.87            Passed - HgbA1C in past 3 or 6 months    Recent Labs   Lab Test  02/06/18   0817   A1C  8.5*            Passed - Patient is age 18 or older       Passed - Recent (6 mo) or future (30 days) visit within the authorizing provider's specialty    Patient had office visit in the last 6 months or has a visit in the next 30 days with authorizing provider or within the authorizing provider's specialty.  See \"Patient Info\" tab in inbasket, or \"Choose Columns\" in Meds & Orders section of the refill encounter.              "

## 2018-04-16 NOTE — TELEPHONE ENCOUNTER
Medication is being filled for 1 time refill only due to:  Patient needs to be seen because due for follow up..   Reminder sent.  June Garcia RN

## 2018-05-20 DIAGNOSIS — E11.22 TYPE 2 DIABETES MELLITUS WITH STAGE 2 CHRONIC KIDNEY DISEASE, WITHOUT LONG-TERM CURRENT USE OF INSULIN (H): ICD-10-CM

## 2018-05-20 DIAGNOSIS — N18.2 TYPE 2 DIABETES MELLITUS WITH STAGE 2 CHRONIC KIDNEY DISEASE, WITHOUT LONG-TERM CURRENT USE OF INSULIN (H): ICD-10-CM

## 2018-05-21 NOTE — TELEPHONE ENCOUNTER
"Requested Prescriptions   Pending Prescriptions Disp Refills     BASAGLAR 100 UNIT/ML injection [Pharmacy Med Name: BASAGLAR 100 UNIT/ML KWIKPEN]  0    Last Written Prescription Date:  4-16-18  Last Fill Quantity: ?,  # refills: 0   Last office visit: 2/6/2018 with prescribing provider:  2-6-18   Future Office Visit:   Sig: INJECT 40 UNITS SUBCUTANEOUS AT BEDTIME    Long Acting Insulin Protocol Failed    5/20/2018  2:42 PM       Failed - HgbA1C in past 3 or 6 months    If HgbA1C is 8 or greater, it needs to be on file within the past 3 months.  If less than 8, must be on file within the past 6 months.     Recent Labs   Lab Test  02/06/18   0817   A1C  8.5*            Passed - Blood pressure less than 140/90 in past 6 months    BP Readings from Last 3 Encounters:   02/06/18 112/80   06/27/17 114/83   01/06/17 119/86                Passed - LDL on file in past 12 months    Recent Labs   Lab Test  02/06/18   0817   LDL  69            Passed - Microalbumin on file in past 12 months    Recent Labs   Lab Test  02/06/18   0817   MICROL  11   UMALCR  4.55            Passed - Serum creatinine on file in past 12 months    Recent Labs   Lab Test  02/06/18   0817   CR  0.87            Passed - Patient is age 18 or older       Passed - Recent (6 mo) or future (30 days) visit within the authorizing provider's specialty    Patient had office visit in the last 6 months or has a visit in the next 30 days with authorizing provider or within the authorizing provider's specialty.  See \"Patient Info\" tab in inbasket, or \"Choose Columns\" in Meds & Orders section of the refill encounter.            "

## 2018-05-21 NOTE — TELEPHONE ENCOUNTER
Routing refill request to provider for review/approval because:  Tati given x1 and patient did not follow up, please advise

## 2018-05-22 RX ORDER — INSULIN GLARGINE 100 [IU]/ML
INJECTION, SOLUTION SUBCUTANEOUS
Qty: 15 ML | Refills: 0 | Status: SHIPPED | OUTPATIENT
Start: 2018-05-22 | End: 2018-06-19

## 2018-05-25 DIAGNOSIS — E11.9 TYPE 2 DIABETES MELLITUS WITHOUT COMPLICATION, WITHOUT LONG-TERM CURRENT USE OF INSULIN (H): ICD-10-CM

## 2018-05-25 RX ORDER — GLIPIZIDE 10 MG/1
TABLET ORAL
Qty: 60 TABLET | Refills: 0 | Status: SHIPPED | OUTPATIENT
Start: 2018-05-25 | End: 2018-06-19

## 2018-05-25 NOTE — TELEPHONE ENCOUNTER
Routing refill request to provider for review/approval because:  Patient needs to be seen because:  Over due for follow up.  Pended for 1 month with reminder

## 2018-05-25 NOTE — TELEPHONE ENCOUNTER
"Requested Prescriptions   Pending Prescriptions Disp Refills     glipiZIDE (GLUCOTROL) 10 MG tablet [Pharmacy Med Name: GLIPIZIDE 10 MG TABLET] 90 tablet 1    Last Written Prescription Date:  04/10/18  Last Fill Quantity: 90,  # refills: 1  Last office visit: 2/6/2018 with prescribing provider:  SUZANNE Ellison Office Visit:     Sig: TAKE 1 TAB IN THE MORNING AND 1 TAB PRIOR TO SUPPER    Sulfonylurea Agents Failed    5/25/2018  1:28 PM       Failed - Patient has documented A1c within the specified period of time.    If HgbA1C is 8 or greater, it needs to be on file within the past 3 months.  If less than 8, must be on file within the past 6 months.     Recent Labs   Lab Test  02/06/18 0817   A1C  8.5*            Passed - Blood pressure less than 140/90 in past 6 months    BP Readings from Last 3 Encounters:   02/06/18 112/80   06/27/17 114/83   01/06/17 119/86                Passed - Patient has documented LDL within the past 12 mos.    Recent Labs   Lab Test  02/06/18 0817   LDL  69            Passed - Patient has had a Microalbumin in the past 12 mos.    Recent Labs   Lab Test  02/06/18 0817   MICROL  11   UMALCR  4.55            Passed - Patient is age 18 or older       Passed - Patient has a recent creatinine (normal) within the past 12 mos.    Recent Labs   Lab Test  02/06/18   0817   CR  0.87            Passed - Recent (6 mo) or future (30 days) visit within the authorizing provider's specialty    Patient had office visit in the last 6 months or has a visit in the next 30 days with authorizing provider or within the authorizing provider's specialty.  See \"Patient Info\" tab in inbasket, or \"Choose Columns\" in Meds & Orders section of the refill encounter.              "

## 2018-06-19 ENCOUNTER — OFFICE VISIT (OUTPATIENT)
Dept: FAMILY MEDICINE | Facility: CLINIC | Age: 48
End: 2018-06-19
Payer: COMMERCIAL

## 2018-06-19 VITALS
DIASTOLIC BLOOD PRESSURE: 88 MMHG | SYSTOLIC BLOOD PRESSURE: 123 MMHG | WEIGHT: 272 LBS | OXYGEN SATURATION: 98 % | HEIGHT: 70 IN | RESPIRATION RATE: 16 BRPM | TEMPERATURE: 97.8 F | HEART RATE: 75 BPM | BODY MASS INDEX: 38.94 KG/M2

## 2018-06-19 DIAGNOSIS — E11.9 TYPE 2 DIABETES MELLITUS WITHOUT COMPLICATION, WITHOUT LONG-TERM CURRENT USE OF INSULIN (H): ICD-10-CM

## 2018-06-19 DIAGNOSIS — N18.2 TYPE 2 DIABETES MELLITUS WITH STAGE 2 CHRONIC KIDNEY DISEASE, WITHOUT LONG-TERM CURRENT USE OF INSULIN (H): Primary | ICD-10-CM

## 2018-06-19 DIAGNOSIS — E11.22 TYPE 2 DIABETES MELLITUS WITH STAGE 2 CHRONIC KIDNEY DISEASE, WITHOUT LONG-TERM CURRENT USE OF INSULIN (H): Primary | ICD-10-CM

## 2018-06-19 DIAGNOSIS — I10 BENIGN ESSENTIAL HYPERTENSION: ICD-10-CM

## 2018-06-19 DIAGNOSIS — E78.5 HYPERLIPIDEMIA LDL GOAL <100: ICD-10-CM

## 2018-06-19 PROBLEM — E66.01 MORBID OBESITY (H): Status: ACTIVE | Noted: 2018-06-19

## 2018-06-19 LAB — HBA1C MFR BLD: 8.3 % (ref 0–5.6)

## 2018-06-19 PROCEDURE — 36415 COLL VENOUS BLD VENIPUNCTURE: CPT | Performed by: PHYSICIAN ASSISTANT

## 2018-06-19 PROCEDURE — 83036 HEMOGLOBIN GLYCOSYLATED A1C: CPT | Performed by: PHYSICIAN ASSISTANT

## 2018-06-19 PROCEDURE — 99214 OFFICE O/P EST MOD 30 MIN: CPT | Performed by: PHYSICIAN ASSISTANT

## 2018-06-19 RX ORDER — INSULIN GLARGINE 100 [IU]/ML
INJECTION, SOLUTION SUBCUTANEOUS
Qty: 15 ML | Refills: 1 | Status: SHIPPED | OUTPATIENT
Start: 2018-06-19 | End: 2018-12-19

## 2018-06-19 RX ORDER — INSULIN GLARGINE 100 [IU]/ML
INJECTION, SOLUTION SUBCUTANEOUS
Qty: 15 ML | Refills: 1 | Status: SHIPPED | OUTPATIENT
Start: 2018-06-19 | End: 2018-06-19

## 2018-06-19 RX ORDER — LISINOPRIL 10 MG/1
TABLET ORAL
Qty: 90 TABLET | Refills: 1 | Status: SHIPPED | OUTPATIENT
Start: 2018-06-19 | End: 2018-12-20

## 2018-06-19 RX ORDER — ATORVASTATIN CALCIUM 10 MG/1
20 TABLET, FILM COATED ORAL DAILY
Qty: 180 TABLET | Refills: 1 | Status: SHIPPED | OUTPATIENT
Start: 2018-06-19 | End: 2018-12-20

## 2018-06-19 RX ORDER — LANCETS
EACH MISCELLANEOUS
Qty: 100 EACH | Refills: 11 | Status: SHIPPED | OUTPATIENT
Start: 2018-06-19 | End: 2019-08-27

## 2018-06-19 RX ORDER — GLIPIZIDE 10 MG/1
TABLET ORAL
Qty: 180 TABLET | Refills: 1 | Status: SHIPPED | OUTPATIENT
Start: 2018-06-19 | End: 2018-12-18

## 2018-06-19 NOTE — MR AVS SNAPSHOT
After Visit Summary   6/19/2018    Paul Ortiz    MRN: [id number]           Patient Information     Date Of Birth          1970        Visit Information        Provider Department      6/19/2018 7:20 AM Dominik Thomas PA-C [facility]        Today's Diagnoses     Type 2 diabetes mellitus with stage 2 chronic kidney disease, without long-term current use of insulin (H)    -  1    Hyperlipidemia LDL goal <100        Type 2 diabetes mellitus without complication, without long-term current use of insulin (H)        Benign essential hypertension           Follow-ups after your visit        Who to contact     Normal or non-critical lab and imaging results will be communicated to you by shopahart, letter or phone within 4 business days after the clinic has received the results. If you do not hear from us within 7 days, please contact the clinic through [employer] or phone. If you have a critical or abnormal lab result, we will notify you by phone as soon as possible.  Submit refill requests through [employer] or call your pharmacy and they will forward the refill request to us. Please allow 3 business days for your refill to be completed.          If you need to speak with a  for additional information , please call: [phone]             Additional Information About Your Visit        shopaharPanX Information     [employer] gives you secure access to your electronic health record. If you see a primary care provider, you can also send messages to your care team and make appointments. If you have questions, please call your primary care clinic.  If you do not have a primary care provider, please call [phone] and they will assist you.        Care EveryWhere ID     This is your Care EveryWhere ID. This could be used by other organizations to access your [facility] medical records  [id number]        Your Vitals Were     Pulse Temperature Respirations Height Pulse Oximetry BMI  "(Body Mass Index)    75 97.8  F (36.6  C) (Tympanic) 16 5' 10\" (1.778 m) 98% 39.03 kg/m2       Blood Pressure from Last 3 Encounters:   06/19/18 123/88   02/06/18 112/80   06/27/17 114/83    Weight from Last 3 Encounters:   06/19/18 272 lb (123.4 kg)   02/06/18 268 lb (121.6 kg)   06/27/17 266 lb (120.7 kg)              We Performed the Following     Hemoglobin A1c     JUST IN CASE          Today's Medication Changes          These changes are accurate as of 6/19/18  8:14 AM.  If you have any questions, ask your nurse or doctor.               Start taking these medicines.        Dose/Directions    BASAGLAR 100 UNIT/ML injection   Used for:  Type 2 diabetes mellitus with stage 2 chronic kidney disease, without long-term current use of insulin (H)   Started by:  Dominik Thomas PA-C        INJECT 55 UNITS SUBCUTANEOUS AT BEDTIME   Quantity:  15 mL   Refills:  1       insulin lispro 100 UNIT/ML injection   Commonly known as:  HumaLOG KWIKpen   Used for:  Type 2 diabetes mellitus with stage 2 chronic kidney disease, without long-term current use of insulin (H)   Started by:  Dominik Thomas PA-C        5 units before breakfast   Quantity:  15 mL   Refills:  1            Where to get your medicines      These medications were sent to [facility]/pharmacy #[id number] - [place], [place] - [address]  [address] [address] , [place] [address]     Phone:  [phone]     atorvastatin 10 MG tablet    BASAGLAR 100 UNIT/ML injection    blood glucose monitoring lancets    dulaglutide 1.5 MG/0.5ML pen    glipiZIDE 10 MG tablet    insulin lispro 100 UNIT/ML injection    lisinopril 10 MG tablet    metFORMIN 1000 MG tablet                Primary Care Provider Office Phone # Fax #    Dominik Thomas PA-C [phone] [address] [place] [address]        Equal Access to Services     OMAR EDMONDSON AH: Hadii meng montgomery Soarabella, waaxda luqadaha, qaybta kaaltu anderson, liz downey " myra vargascarmendmitry baroneLouisaan ah. So [facility] [phone].    ATENCIÓN: Si ranjitla prosper, tiene a dye disposición servicios gratuitos de asistencia lingüística. Bret al [phone].    We comply with applicable federal civil rights laws and [place] laws. We do not discriminate on the basis of race, color, national origin, age, disability, sex, sexual orientation, or gender identity.            Thank you!     Thank you for choosing [facility]  for your care. Our goal is always to provide you with excellent care. Hearing back from our patients is one way we can continue to improve our services. Please take a few minutes to complete the written survey that you may receive in the mail after your visit with us. Thank you!             Your Updated Medication List - Protect others around you: Learn how to safely use, store and throw away your medicines at www.disposemymeds.org.          This list is accurate as of 6/19/18  8:14 AM.  Always use your most recent med list.                   Brand Name Dispense Instructions for use Diagnosis    aspirin 81 MG tablet     30 tablet    Take 1 tablet by mouth daily.        atorvastatin 10 MG tablet    LIPITOR    180 tablet    Take 2 tablets (20 mg) by mouth daily    Hyperlipidemia LDL goal <100       BASAGLAR 100 UNIT/ML injection     15 mL    INJECT 55 UNITS SUBCUTANEOUS AT BEDTIME    Type 2 diabetes mellitus with stage 2 chronic kidney disease, without long-term current use of insulin (H)       blood glucose monitoring lancets     100 each    TEST DAILY AND AS NEEDED    Type 2 diabetes mellitus with stage 2 chronic kidney disease, without long-term current use of insulin (H)       dulaglutide 1.5 MG/0.5ML pen    TRULICITY    6 mL    Inject 1.5 mg Subcutaneous every 7 days    Type 2 diabetes mellitus with stage 2 chronic kidney disease, without long-term current use of insulin (H)       glipiZIDE 10 MG tablet    GLUCOTROL    180 tablet    TAKE 1 TAB IN THE MORNING AND 1 TAB  PRIOR TO SUPPER    Type 2 diabetes mellitus without complication, without long-term current use of insulin (H)       insulin lispro 100 UNIT/ML injection    HumaLOG KWIKpen    15 mL    5 units before breakfast    Type 2 diabetes mellitus with stage 2 chronic kidney disease, without long-term current use of insulin (H)       insulin pen needle 31G X 6 MM     100 each    Use once  daily or as directed.    Type 2 diabetes mellitus without complication, without long-term current use of insulin (H)       lisinopril 10 MG tablet    PRINIVIL/ZESTRIL    90 tablet    TAKE 1 TABLET (10 MG) BY MOUTH DAILY    Benign essential hypertension       metFORMIN 1000 MG tablet    GLUCOPHAGE    180 tablet    TAKE 1 TABLET (1,000 MG) BY MOUTH 2 TIMES DAILY (WITH MEALS)    Type 2 diabetes mellitus without complication, without long-term current use of insulin (H)       * order for DME     100 each    Lancets.  Daily and prn.    Type 2 diabetes mellitus without complication (H)       * order for DME     200 each    Test strips for pt's glucometer, brand as covered by insurance Test bid and prn.    Type 2 diabetes mellitus with stage 2 chronic kidney disease, without long-term current use of insulin (H)       * order for DME     1 each    Glucometer: Accu-chek Guide    Type 2 diabetes mellitus with stage 2 chronic kidney disease, without long-term current use of insulin (H)       * order for DME     200 each    Test strips for pt's glucometer: Test bid and prn.    Type 2 diabetes mellitus with stage 2 chronic kidney disease, without long-term current use of insulin (H)       * Notice:  This list has 4 medication(s) that are the same as other medications prescribed for you. Read the directions carefully, and ask your doctor or other care provider to review them with you.

## 2018-06-19 NOTE — PROGRESS NOTES
SUBJECTIVE:   Paul Ortiz is a 48 year old male who presents to clinic today for the following health issues:      Diabetes Follow-up    Patient is checking blood sugars: once daily.  Results are as follows:         am - 125-160    Diabetic concerns: None     Symptoms of hypoglycemia (low blood sugar): none     Paresthesias (numbness or burning in feet) or sores: No     Date of last diabetic eye exam: 2018    BP Readings from Last 2 Encounters:   06/19/18 123/88   02/06/18 112/80     Hemoglobin A1C (%)   Date Value   02/06/2018 8.5 (H)   06/27/2017 7.9 (H)     LDL Cholesterol Calculated (mg/dL)   Date Value   02/06/2018 69   06/27/2017 80       Amount of exercise or physical activity: 2-3 days/week for an average of 30-45 minutes    Problems taking medications regularly: No    Medication side effects: none    Diet: regular (no restrictions)            Problem list and histories reviewed & adjusted, as indicated.  Additional history: as documented    BP Readings from Last 3 Encounters:   06/19/18 123/88   02/06/18 112/80   06/27/17 114/83    Wt Readings from Last 3 Encounters:   06/19/18 272 lb (123.4 kg)   02/06/18 268 lb (121.6 kg)   06/27/17 266 lb (120.7 kg)                    Reviewed and updated as needed this visit by clinical staff  Tobacco  Allergies  Meds       Reviewed and updated as needed this visit by Provider         All other systems negative except as outline above  OBJECTIVE:  Eye exam - right eye normal lid, conjunctiva, cornea, pupil and fundus, left eye normal lid, conjunctiva, cornea, pupil and fundus.  Thyroid not palpable, not enlarged, no nodules detected.  CHEST:chest clear to IPPA, no tachypnea, retractions or cyanosis and S1, S2 normal, no murmur, no gallop, rate regular.  Foot exam - both sides normal; no swelling, tenderness or skin or vascular lesions. Color and temperature is normal. Sensation is intact. Peripheral pulses are palpable. Toenails are normal.    Paul was seen  today for diabetes.    Diagnoses and all orders for this visit:    Type 2 diabetes mellitus with stage 2 chronic kidney disease, without long-term current use of insulin (H)  -     Hemoglobin A1c  -     JUST IN CASE  -     Discontinue: BASAGLAR 100 UNIT/ML injection; INJECT 50 UNITS SUBCUTANEOUS AT BEDTIME  -     blood glucose monitoring (ACCU-CHEK FASTCLIX) lancets; TEST DAILY AND AS NEEDED  -     dulaglutide (TRULICITY) 1.5 MG/0.5ML pen; Inject 1.5 mg Subcutaneous every 7 days  -     BASAGLAR 100 UNIT/ML injection; INJECT 55 UNITS SUBCUTANEOUS AT BEDTIME  -     insulin lispro (HUMALOG KWIKPEN) 100 UNIT/ML injection; 5 units before breakfast    Hyperlipidemia LDL goal <100  -     atorvastatin (LIPITOR) 10 MG tablet; Take 2 tablets (20 mg) by mouth daily    Type 2 diabetes mellitus without complication, without long-term current use of insulin (H)  -     glipiZIDE (GLUCOTROL) 10 MG tablet; TAKE 1 TAB IN THE MORNING AND 1 TAB PRIOR TO SUPPER  -     metFORMIN (GLUCOPHAGE) 1000 MG tablet; TAKE 1 TABLET (1,000 MG) BY MOUTH 2 TIMES DAILY (WITH MEALS)    Benign essential hypertension  -     lisinopril (PRINIVIL/ZESTRIL) 10 MG tablet; TAKE 1 TABLET (10 MG) BY MOUTH DAILY      work on lifestyle modification  Recheck in 3 mos

## 2018-09-07 DIAGNOSIS — E11.22 TYPE 2 DIABETES MELLITUS WITH STAGE 2 CHRONIC KIDNEY DISEASE, WITHOUT LONG-TERM CURRENT USE OF INSULIN (H): ICD-10-CM

## 2018-09-07 DIAGNOSIS — N18.2 TYPE 2 DIABETES MELLITUS WITH STAGE 2 CHRONIC KIDNEY DISEASE, WITHOUT LONG-TERM CURRENT USE OF INSULIN (H): ICD-10-CM

## 2018-09-07 NOTE — TELEPHONE ENCOUNTER
"R  equested Prescriptions   Pending Prescriptions Disp Refills     BASAGLAR 100 UNIT/ML injection [Pharmacy Med Name: BASAGLAR 100 UNIT/ML KWIKPEN]  1     Sig: INJECT 50 UNITS SUBCUTANEOUS AT BEDTIME      Last Written Prescription Date:  06/19/18  Last Fill Quantity: 15ml,  # refills: 1   Last office visit: 6/19/2018 with prescribing provider:     Future Office Visit:        Long Acting Insulin Protocol Passed    9/7/2018  3:58 AM       Passed - Blood pressure less than 140/90 in past 6 months    BP Readings from Last 3 Encounters:   06/19/18 123/88   02/06/18 112/80   06/27/17 114/83                Passed - LDL on file in past 12 months    Recent Labs   Lab Test  02/06/18   0817   LDL  69            Passed - Microalbumin on file in past 12 months    Recent Labs   Lab Test  02/06/18   0817   MICROL  11   UMALCR  4.55            Passed - Serum creatinine on file in past 12 months    Recent Labs   Lab Test  02/06/18   0817   CR  0.87            Passed - HgbA1C in past 3 or 6 months    If HgbA1C is 8 or greater, it needs to be on file within the past 3 months.  If less than 8, must be on file within the past 6 months.     Recent Labs   Lab Test  06/19/18   0742   A1C  8.3*            Passed - Patient is age 18 or older       Passed - Recent (6 mo) or future (30 days) visit within the authorizing provider's specialty    Patient had office visit in the last 6 months or has a visit in the next 30 days with authorizing provider or within the authorizing provider's specialty.  See \"Patient Info\" tab in inbasket, or \"Choose Columns\" in Meds & Orders section of the refill encounter.              "

## 2018-09-12 RX ORDER — INSULIN GLARGINE 100 [IU]/ML
INJECTION, SOLUTION SUBCUTANEOUS
Qty: 15 ML | Refills: 0 | Status: SHIPPED | OUTPATIENT
Start: 2018-09-12 | End: 2018-10-12

## 2018-09-12 NOTE — TELEPHONE ENCOUNTER
Prescription approved per [facility] Refill Protocol, gurwinder refill given as patient due for follow up per Dominik Thomas's note 6/19/18 for 3 month follow up. Given instructions via pt sig for follow up.  Stacie Liu RN on 9/12/2018 at 12:29 PM

## 2018-09-12 NOTE — TELEPHONE ENCOUNTER
Type 2 diabetes mellitus with stage 2 chronic kidney disease, without long-term current use of insulin (H)  -     Hemoglobin A1c  -     JUST IN CASE  -     Discontinue: BASAGLAR 100 UNIT/ML injection; INJECT 50 UNITS SUBCUTANEOUS AT BEDTIME  -     blood glucose monitoring (ACCU-CHEK FASTCLIX) lancets; TEST DAILY AND AS NEEDED  -     dulaglutide (TRULICITY) 1.5 MG/0.5ML pen; Inject 1.5 mg Subcutaneous every 7 days  -     BASAGLAR 100 UNIT/ML injection; INJECT 55 UNITS SUBCUTANEOUS AT BEDTIME  -     insulin lispro (HUMALOG KWIKPEN) 100 UNIT/ML injection; 5 units before breakfast    work on lifestyle modification  Recheck in 3 mos     Stacie Liu RN on 9/12/2018 at 12:30 PM

## 2018-10-12 DIAGNOSIS — N18.2 TYPE 2 DIABETES MELLITUS WITH STAGE 2 CHRONIC KIDNEY DISEASE, WITHOUT LONG-TERM CURRENT USE OF INSULIN (H): ICD-10-CM

## 2018-10-12 DIAGNOSIS — E11.22 TYPE 2 DIABETES MELLITUS WITH STAGE 2 CHRONIC KIDNEY DISEASE, WITHOUT LONG-TERM CURRENT USE OF INSULIN (H): ICD-10-CM

## 2018-10-12 NOTE — TELEPHONE ENCOUNTER
Routing refill request to provider for review/approval because:  Failed Hbg A1C out of range  Patient has 2 active Basaglar prescriptions, 50units and 55units.  Please advise      Molly Morris RN, BSN, PHN

## 2018-10-12 NOTE — TELEPHONE ENCOUNTER
"Requested Prescriptions   Pending Prescriptions Disp Refills     BASAGLAR 100 UNIT/ML injection [Pharmacy Med Name: BASAGLAR 100 UNIT/ML KWIKPEN]  0    Last Written Prescription Date:  09/12/18  Last Fill Quantity: 15,  # refills: 0   Last office visit: 6/19/2018 with prescribing provider:  SUZANNE Thomas Future Office Visit:     Sig: INJECT 50 UNITS SUBCUTANEOUS AT BEDTIME    Long Acting Insulin Protocol Failed    10/12/2018  1:22 AM       Failed - HgbA1C in past 3 or 6 months    If HgbA1C is 8 or greater, it needs to be on file within the past 3 months.  If less than 8, must be on file within the past 6 months.     Recent Labs   Lab Test  06/19/18   0742   A1C  8.3*            Passed - Blood pressure less than 140/90 in past 6 months    BP Readings from Last 3 Encounters:   06/19/18 123/88   02/06/18 112/80   06/27/17 114/83                Passed - LDL on file in past 12 months    Recent Labs   Lab Test  02/06/18   0817   LDL  69            Passed - Microalbumin on file in past 12 months    Recent Labs   Lab Test  02/06/18   0817   MICROL  11   UMALCR  4.55            Passed - Serum creatinine on file in past 12 months    Recent Labs   Lab Test  02/06/18   0817   CR  0.87            Passed - Patient is age 18 or older       Passed - Recent (6 mo) or future (30 days) visit within the authorizing provider's specialty    Patient had office visit in the last 6 months or has a visit in the next 30 days with authorizing provider or within the authorizing provider's specialty.  See \"Patient Info\" tab in inbasket, or \"Choose Columns\" in Meds & Orders section of the refill encounter.              "

## 2018-10-14 RX ORDER — INSULIN GLARGINE 100 [IU]/ML
INJECTION, SOLUTION SUBCUTANEOUS
Qty: 15 ML | Refills: 0 | Status: SHIPPED | OUTPATIENT
Start: 2018-10-14 | End: 2018-12-20

## 2018-12-20 ENCOUNTER — OFFICE VISIT (OUTPATIENT)
Dept: FAMILY MEDICINE | Facility: CLINIC | Age: 48
End: 2018-12-20
Payer: COMMERCIAL

## 2018-12-20 VITALS
OXYGEN SATURATION: 96 % | HEART RATE: 78 BPM | SYSTOLIC BLOOD PRESSURE: 120 MMHG | RESPIRATION RATE: 16 BRPM | WEIGHT: 258 LBS | TEMPERATURE: 97.8 F | BODY MASS INDEX: 36.94 KG/M2 | DIASTOLIC BLOOD PRESSURE: 86 MMHG | HEIGHT: 70 IN

## 2018-12-20 DIAGNOSIS — N18.2 TYPE 2 DIABETES MELLITUS WITH STAGE 2 CHRONIC KIDNEY DISEASE, WITHOUT LONG-TERM CURRENT USE OF INSULIN (H): ICD-10-CM

## 2018-12-20 DIAGNOSIS — E11.22 TYPE 2 DIABETES MELLITUS WITH STAGE 2 CHRONIC KIDNEY DISEASE, WITHOUT LONG-TERM CURRENT USE OF INSULIN (H): ICD-10-CM

## 2018-12-20 DIAGNOSIS — I10 BENIGN ESSENTIAL HYPERTENSION: ICD-10-CM

## 2018-12-20 DIAGNOSIS — E78.5 HYPERLIPIDEMIA LDL GOAL <100: ICD-10-CM

## 2018-12-20 DIAGNOSIS — E11.9 TYPE 2 DIABETES MELLITUS WITHOUT COMPLICATION, WITHOUT LONG-TERM CURRENT USE OF INSULIN (H): ICD-10-CM

## 2018-12-20 LAB
ANION GAP SERPL CALCULATED.3IONS-SCNC: 8 MMOL/L (ref 3–14)
BUN SERPL-MCNC: 16 MG/DL (ref 7–30)
CALCIUM SERPL-MCNC: 9.3 MG/DL (ref 8.5–10.1)
CHLORIDE SERPL-SCNC: 107 MMOL/L (ref 94–109)
CHOLEST SERPL-MCNC: 142 MG/DL
CO2 SERPL-SCNC: 26 MMOL/L (ref 20–32)
CREAT SERPL-MCNC: 1.04 MG/DL (ref 0.66–1.25)
GFR SERPL CREATININE-BSD FRML MDRD: 83 ML/MIN/{1.73_M2}
GLUCOSE SERPL-MCNC: 92 MG/DL (ref 70–99)
HBA1C MFR BLD: 5.7 % (ref 0–5.6)
HDLC SERPL-MCNC: 50 MG/DL
LDLC SERPL CALC-MCNC: 75 MG/DL
NONHDLC SERPL-MCNC: 92 MG/DL
POTASSIUM SERPL-SCNC: 4.7 MMOL/L (ref 3.4–5.3)
SODIUM SERPL-SCNC: 141 MMOL/L (ref 133–144)
TRIGL SERPL-MCNC: 87 MG/DL

## 2018-12-20 PROCEDURE — 36415 COLL VENOUS BLD VENIPUNCTURE: CPT | Performed by: PHYSICIAN ASSISTANT

## 2018-12-20 PROCEDURE — 80061 LIPID PANEL: CPT | Performed by: PHYSICIAN ASSISTANT

## 2018-12-20 PROCEDURE — 83036 HEMOGLOBIN GLYCOSYLATED A1C: CPT | Performed by: PHYSICIAN ASSISTANT

## 2018-12-20 PROCEDURE — 99214 OFFICE O/P EST MOD 30 MIN: CPT | Performed by: PHYSICIAN ASSISTANT

## 2018-12-20 PROCEDURE — 80048 BASIC METABOLIC PNL TOTAL CA: CPT | Performed by: PHYSICIAN ASSISTANT

## 2018-12-20 PROCEDURE — 99207 C FOOT EXAM  NO CHARGE: CPT | Performed by: PHYSICIAN ASSISTANT

## 2018-12-20 RX ORDER — LISINOPRIL 10 MG/1
TABLET ORAL
Qty: 90 TABLET | Refills: 1 | Status: SHIPPED | OUTPATIENT
Start: 2018-12-20 | End: 2019-08-24

## 2018-12-20 RX ORDER — ATORVASTATIN CALCIUM 10 MG/1
20 TABLET, FILM COATED ORAL DAILY
Qty: 180 TABLET | Refills: 1 | Status: SHIPPED | OUTPATIENT
Start: 2018-12-20 | End: 2019-08-27

## 2018-12-20 RX ORDER — INSULIN GLARGINE 100 [IU]/ML
INJECTION, SOLUTION SUBCUTANEOUS
Qty: 15 ML | Refills: 1 | Status: SHIPPED | OUTPATIENT
Start: 2018-12-20 | End: 2019-03-31

## 2018-12-20 RX ORDER — GLIPIZIDE 10 MG/1
TABLET ORAL
Qty: 180 TABLET | Refills: 1 | Status: CANCELLED | OUTPATIENT
Start: 2018-12-20

## 2018-12-20 ASSESSMENT — MIFFLIN-ST. JEOR: SCORE: 2041.53

## 2018-12-20 NOTE — PROGRESS NOTES
SUBJECTIVE:   Paul Ortiz is a 48 year old male who presents to clinic today for the following health issues:      Diabetes Follow-up    Patient is checking blood sugars: once daily.  Results are as follows:         am - 100    Diabetic concerns: None     Symptoms of hypoglycemia (low blood sugar): none     Paresthesias (numbness or burning in feet) or sores: No     Date of last diabetic eye exam: 2018  Started a keto diet. Was getting more freq hypoglycemia, so he stopped his premeal insulin. Continues to administer 30 units of the basaglar at bedtime.  Diabetes Management Resources    Hyperlipidemia Follow-Up      Rate your low fat/cholesterol diet?: good    Taking statin?  Yes, no muscle aches from statin    Other lipid medications/supplements?:  none    Hypertension Follow-up      Outpatient blood pressures are not being checked.    Low Salt Diet: not monitoring salt    BP Readings from Last 2 Encounters:   12/20/18 120/86   06/19/18 123/88     Hemoglobin A1C (%)   Date Value   12/20/2018 5.7 (H)   06/19/2018 8.3 (H)     LDL Cholesterol Calculated (mg/dL)   Date Value   02/06/2018 69   06/27/2017 80       Amount of exercise or physical activity: 1 day/week for an average of 30-45 minutes    Problems taking medications regularly: No    Medication side effects: none    Diet: low carb            Problem list and histories reviewed & adjusted, as indicated.  Additional history: as documented    BP Readings from Last 3 Encounters:   12/20/18 120/86   06/19/18 123/88   02/06/18 112/80    Wt Readings from Last 3 Encounters:   12/20/18 117 kg (258 lb)   06/19/18 123.4 kg (272 lb)   02/06/18 121.6 kg (268 lb)                    Reviewed and updated as needed this visit by clinical staff  Tobacco  Allergies  Meds       Reviewed and updated as needed this visit by Provider         All other systems negative except as outline above  OBJECTIVE:  Eye exam - right eye normal lid, conjunctiva, cornea, pupil and  fundus, left eye normal lid, conjunctiva, cornea, pupil and fundus.  Thyroid not palpable, not enlarged, no nodules detected.    CHEST:chest clear to IPPA, no tachypnea, retractions or cyanosis and S1, S2 normal, no murmur, no gallop, rate regular.  Foot exam - both sides normal; no swelling, tenderness or skin or vascular lesions. Color and temperature is normal. Sensation is intact. Peripheral pulses are palpable. Toenails are normal.    Paul was seen today for diabetes.    Diagnoses and all orders for this visit:    Hyperlipidemia LDL goal <100  -     atorvastatin (LIPITOR) 10 MG tablet; Take 2 tablets (20 mg) by mouth daily  -     Lipid panel reflex to direct LDL Fasting    Type 2 diabetes mellitus with stage 2 chronic kidney disease, without long-term current use of insulin (H)  -     insulin glargine (BASAGLAR KWIKPEN) 100 UNIT/ML pen; INJECT 50 UNITS SUBCUTANEOUS AT BEDTIME  -     dulaglutide (TRULICITY) 1.5 MG/0.5ML pen; Inject 1.5 mg Subcutaneous every 7 days  -     order for DME; Test strips for pt's glucometer: Test bid and prn.  -     Hemoglobin A1c  -     JUST IN CASE  -     FOOT EXAM    Type 2 diabetes mellitus without complication, without long-term current use of insulin (H)  -     metFORMIN (GLUCOPHAGE) 1000 MG tablet; TAKE 1 TABLET (1,000 MG) BY MOUTH 2 TIMES DAILY (WITH MEALS)    Benign essential hypertension  -     lisinopril (PRINIVIL/ZESTRIL) 10 MG tablet; TAKE 1 TABLET (10 MG) BY MOUTH DAILY  -     Basic metabolic panel  (Ca, Cl, CO2, Creat, Gluc, K, Na, BUN)    Other orders  -     Cancel: glipiZIDE (GLUCOTROL) 10 MG tablet; TAKE 1 TAB IN THE MORNING AND 1 TAB PRIOR TO SUPPER  -     Cancel: insulin lispro (HUMALOG KWIKPEN) 100 UNIT/ML pen; 5 units before breakfast      work on lifestyle modification  Recheck in 6 mos

## 2019-03-25 ENCOUNTER — MYC MEDICAL ADVICE (OUTPATIENT)
Dept: FAMILY MEDICINE | Facility: CLINIC | Age: 49
End: 2019-03-25

## 2019-03-31 DIAGNOSIS — E11.22 TYPE 2 DIABETES MELLITUS WITH STAGE 2 CHRONIC KIDNEY DISEASE, WITHOUT LONG-TERM CURRENT USE OF INSULIN (H): ICD-10-CM

## 2019-03-31 DIAGNOSIS — N18.2 TYPE 2 DIABETES MELLITUS WITH STAGE 2 CHRONIC KIDNEY DISEASE, WITHOUT LONG-TERM CURRENT USE OF INSULIN (H): ICD-10-CM

## 2019-04-01 RX ORDER — INSULIN GLARGINE 100 [IU]/ML
INJECTION, SOLUTION SUBCUTANEOUS
Qty: 15 ML | Refills: 1 | Status: SHIPPED | OUTPATIENT
Start: 2019-04-01 | End: 2019-06-03

## 2019-04-01 NOTE — TELEPHONE ENCOUNTER
"Requested Prescriptions   Pending Prescriptions Disp Refills     insulin glargine (BASAGLAR KWIKPEN) 100 UNIT/ML pen [Pharmacy Med Name: BASAGLAR 100 UNIT/ML KWIKPEN]  1    Last Written Prescription Date:  2-28-19  Last Fill Quantity: ?,  # refills: 1   Last office visit: 12/20/2018 with prescribing provider:  12-20-18   Future Office Visit:   Sig: INJECT 30 UNITS SUBCUTANEOUS AT BEDTIME    Long Acting Insulin Protocol Passed - 3/31/2019  4:13 AM       Passed - Blood pressure less than 140/90 in past 6 months    BP Readings from Last 3 Encounters:   12/20/18 120/86   06/19/18 123/88   02/06/18 112/80                Passed - LDL on file in past 12 months    Recent Labs   Lab Test 12/20/18  0821   LDL 75            Passed - Microalbumin on file in past 12 months    Recent Labs   Lab Test 02/06/18  0817   MICROL 11   UMALCR 4.55            Passed - Serum creatinine on file in past 12 months    Recent Labs   Lab Test 12/20/18  0821   CR 1.04            Passed - HgbA1C in past 3 or 6 months    If HgbA1C is 8 or greater, it needs to be on file within the past 3 months.  If less than 8, must be on file within the past 6 months.     Recent Labs   Lab Test 12/20/18  0821   A1C 5.7*            Passed - Medication is active on med list       Passed - Patient is age 18 or older       Passed - Recent (6 mo) or future (30 days) visit within the authorizing provider's specialty    Patient had office visit in the last 6 months or has a visit in the next 30 days with authorizing provider or within the authorizing provider's specialty.  See \"Patient Info\" tab in inbasket, or \"Choose Columns\" in Meds & Orders section of the refill encounter.            "

## 2019-04-01 NOTE — TELEPHONE ENCOUNTER
Lab Results   Component Value Date    A1C 5.7 12/20/2018    A1C 8.3 06/19/2018    A1C 8.5 02/06/2018    A1C 7.9 06/27/2017    A1C 8.5 01/06/2017     Prescription approved per [facility] Refill Protocol.  Stacie Liu, RN, BSN

## 2019-06-03 DIAGNOSIS — N18.2 TYPE 2 DIABETES MELLITUS WITH STAGE 2 CHRONIC KIDNEY DISEASE, WITHOUT LONG-TERM CURRENT USE OF INSULIN (H): ICD-10-CM

## 2019-06-03 DIAGNOSIS — E11.22 TYPE 2 DIABETES MELLITUS WITH STAGE 2 CHRONIC KIDNEY DISEASE, WITHOUT LONG-TERM CURRENT USE OF INSULIN (H): ICD-10-CM

## 2019-06-03 NOTE — TELEPHONE ENCOUNTER
"Requested Prescriptions   Pending Prescriptions Disp Refills     insulin glargine (LANTUS SOLOSTAR PEN) 100 UNIT/ML pen [Pharmacy Med Name: LANTUS SOLOSTAR 100 UNIT/ML]  1     Sig: INJECT 30 UNITS SUBCUTANEOUS AT BEDTIME--DUE FOR DIABETES FOLLOW UP APPOINTMENT JUNE FOR REFILLS   Last Written Prescription Date:  5-1-19  Last Fill Quantity: ?,  # refills: 1   Last office visit: 12/20/2018 with prescribing provider:  12-20-18   Future Office Visit:      Long Acting Insulin Protocol Failed - 6/3/2019  1:13 AM        Failed - Microalbumin on file in past 12 months     Recent Labs   Lab Test 02/06/18  0817   MICROL 11   UMALCR 4.55             Passed - Blood pressure less than 140/90 in past 6 months     BP Readings from Last 3 Encounters:   12/20/18 120/86   06/19/18 123/88   02/06/18 112/80                 Passed - LDL on file in past 12 months     Recent Labs   Lab Test 12/20/18  0821   LDL 75             Passed - Serum creatinine on file in past 12 months     Recent Labs   Lab Test 12/20/18  0821   CR 1.04             Passed - HgbA1C in past 3 or 6 months     If HgbA1C is 8 or greater, it needs to be on file within the past 3 months.  If less than 8, must be on file within the past 6 months.     Recent Labs   Lab Test 12/20/18  0821   A1C 5.7*             Passed - Medication is active on med list        Passed - Patient is age 18 or older        Passed - Recent (6 mo) or future (30 days) visit within the authorizing provider's specialty     Patient had office visit in the last 6 months or has a visit in the next 30 days with authorizing provider or within the authorizing provider's specialty.  See \"Patient Info\" tab in inbasket, or \"Choose Columns\" in Meds & Orders section of the refill encounter.            "

## 2019-06-04 NOTE — TELEPHONE ENCOUNTER
Routing refill request to provider for review/approval because:  Labs not current:  microalbumin    Pt due for follow up 6/20/19    Med pended for 1 month supply with reminder.

## 2019-07-12 DIAGNOSIS — N18.2 TYPE 2 DIABETES MELLITUS WITH STAGE 2 CHRONIC KIDNEY DISEASE, WITHOUT LONG-TERM CURRENT USE OF INSULIN (H): ICD-10-CM

## 2019-07-12 DIAGNOSIS — E11.22 TYPE 2 DIABETES MELLITUS WITH STAGE 2 CHRONIC KIDNEY DISEASE, WITHOUT LONG-TERM CURRENT USE OF INSULIN (H): ICD-10-CM

## 2019-07-12 NOTE — TELEPHONE ENCOUNTER
"Requested Prescriptions   Pending Prescriptions Disp Refills     insulin glargine (LANTUS SOLOSTAR PEN) 100 UNIT/ML pen [Pharmacy Med Name: LANTUS SOLOSTAR 100 UNIT/ML]  0     Sig: INJECT 30 UNITS SUBCUTANEOUS AT BEDTIME--DUE FOR DIABETES FOLLOW UP APPOINTMENT JUNE FOR REFILLS  Last Written Prescription Date:  6/5/19  Last Fill Quantity: 15,  # refills: 0   Last office visit: 12/20/2018 with prescribing provider:  TAI Thomas   Future Office Visit:         Long Acting Insulin Protocol Failed - 7/12/2019  3:34 AM        Failed - Blood pressure less than 140/90 in past 6 months     BP Readings from Last 3 Encounters:   12/20/18 120/86   06/19/18 123/88   02/06/18 112/80                 Failed - Microalbumin on file in past 12 months     Recent Labs   Lab Test 02/06/18  0817   MICROL 11   UMALCR 4.55             Failed - HgbA1C in past 3 or 6 months     If HgbA1C is 8 or greater, it needs to be on file within the past 3 months.  If less than 8, must be on file within the past 6 months.     Recent Labs   Lab Test 12/20/18  0821   A1C 5.7*             Failed - Recent (6 mo) or future (30 days) visit within the authorizing provider's specialty     Patient had office visit in the last 6 months or has a visit in the next 30 days with authorizing provider or within the authorizing provider's specialty.  See \"Patient Info\" tab in inbasket, or \"Choose Columns\" in Meds & Orders section of the refill encounter.            Passed - LDL on file in past 12 months     Recent Labs   Lab Test 12/20/18  0821   LDL 75             Passed - Serum creatinine on file in past 12 months     Recent Labs   Lab Test 12/20/18  0821   CR 1.04             Passed - Medication is active on med list        Passed - Patient is age 18 or older        "

## 2019-07-16 NOTE — TELEPHONE ENCOUNTER
My chart message sent to schedule appointment no appointment noted since message sent on 7-12-19.  Will have TC call patient.

## 2019-08-24 DIAGNOSIS — I10 BENIGN ESSENTIAL HYPERTENSION: ICD-10-CM

## 2019-08-24 DIAGNOSIS — E11.9 TYPE 2 DIABETES MELLITUS WITHOUT COMPLICATION, WITHOUT LONG-TERM CURRENT USE OF INSULIN (H): ICD-10-CM

## 2019-08-26 RX ORDER — LISINOPRIL 10 MG/1
TABLET ORAL
Qty: 90 TABLET | Refills: 0 | Status: SHIPPED | OUTPATIENT
Start: 2019-08-26 | End: 2019-08-27

## 2019-08-26 NOTE — TELEPHONE ENCOUNTER
Requested Prescriptions   Pending Prescriptions Disp Refills     metFORMIN (GLUCOPHAGE) 1000 MG tablet [Pharmacy Med Name: METFORMIN HCL 1,000 MG TABLET]  Last Written Prescription Date:  05/27/19  Last Fill Quantity: 180,  # refills: 1 :    Last office visit: 12/20/2018 with prescribing provider:  SUZANNE Thomas   Future Office Visit:   Next 5 appointments (look out 90 days)    Aug 27, 2019  8:20 AM CDT  [facility] Visit with Dominik [facility] PA-C  [facility] [address] [address]  [phone]          180 tablet 1     Sig: TAKE 1 TABLET (1,000 MG) BY MOUTH 2 TIMES DAILY (WITH MEALS)       Biguanide Agents Failed - 8/24/2019 12:13 AM        Failed - Blood pressure less than 140/90 in past 6 months     BP Readings from Last 3 Encounters:   12/20/18 120/86   06/19/18 123/88   02/06/18 112/80                 Failed - Patient has had a Microalbumin in the past 15 mos.     Recent Labs   Lab Test 02/06/18  0817   MICROL 11   UMALCR 4.55             Failed - Patient has documented A1c within the specified period of time.     If HgbA1C is 8 or greater, it needs to be on file within the past 3 months.  If less than 8, must be on file within the past 6 months.     Recent Labs   Lab Test 12/20/18  0821   A1C 5.7*             Passed - Patient has documented LDL within the past 12 mos.     Recent Labs   Lab Test 12/20/18  0821   LDL 75             Passed - Patient is age 10 or older        Passed - Patient's CR is NOT>1.4 OR Patient's EGFR is NOT<45 within past 12 mos.     Recent Labs   Lab Test 12/20/18  0821   GFRESTIMATED 83   GFRESTBLACK >90       Recent Labs   Lab Test 12/20/18  0821   CR 1.04             Passed - Patient does NOT have a diagnosis of CHF.        Passed - Medication is active on med list        Passed - Recent (6 mo) or future (30 days) visit within the authorizing provider's specialty     Patient had office visit in the last 6 months or has a visit  "in the next 30 days with authorizing provider or within the authorizing provider's specialty.  See \"Patient Info\" tab in inbasket, or \"Choose Columns\" in Meds & Orders section of the refill encounter.            lisinopril (PRINIVIL/ZESTRIL) 10 MG tablet [Pharmacy Med Name: LISINOPRIL 10 MG TABLET]  Last Written Prescription Date:  12/20/19  Last Fill Quantity: 90,  # refills: 1   Last office visit: 12/20/2018 with prescribing provider:  SUZANNE Thomas   Future Office Visit:   Next 5 appointments (look out 90 days)    Aug 27, 2019  8:20 AM CDT  Office Visit with Dominik [facility] PA-C  [facility] [address] [facility] [address]  [phone]          90 tablet 1     Sig: TAKE 1 TABLET BY MOUTH EVERY DAY       ACE Inhibitors (Including Combos) Protocol Passed - 8/24/2019 12:13 AM        Passed - Blood pressure under 140/90 in past 12 months     BP Readings from Last 3 Encounters:   12/20/18 120/86   06/19/18 123/88   02/06/18 112/80                 Passed - Recent (12 mo) or future (30 days) visit within the authorizing provider's specialty     Patient had office visit in the last 12 months or has a visit in the next 30 days with authorizing provider or within the authorizing provider's specialty.  See \"Patient Info\" tab in inbasket, or \"Choose Columns\" in Meds & Orders section of the refill encounter.              Passed - Medication is active on med list        Passed - Patient is age 18 or older        Passed - Normal serum creatinine on file in past 12 months     Recent Labs   Lab Test 12/20/18  0821   CR 1.04             Passed - Normal serum potassium on file in past 12 months     Recent Labs   Lab Test 12/20/18  0821   POTASSIUM 4.7               "

## 2019-08-27 ENCOUNTER — OFFICE VISIT (OUTPATIENT)
Dept: FAMILY MEDICINE | Facility: CLINIC | Age: 49
End: 2019-08-27
Payer: COMMERCIAL

## 2019-08-27 VITALS
RESPIRATION RATE: 18 BRPM | OXYGEN SATURATION: 100 % | WEIGHT: 258 LBS | BODY MASS INDEX: 36.94 KG/M2 | DIASTOLIC BLOOD PRESSURE: 74 MMHG | TEMPERATURE: 97.2 F | SYSTOLIC BLOOD PRESSURE: 102 MMHG | HEIGHT: 70 IN | HEART RATE: 86 BPM

## 2019-08-27 DIAGNOSIS — E11.22 TYPE 2 DIABETES MELLITUS WITH STAGE 2 CHRONIC KIDNEY DISEASE, WITH LONG-TERM CURRENT USE OF INSULIN (H): Primary | ICD-10-CM

## 2019-08-27 DIAGNOSIS — E78.5 HYPERLIPIDEMIA LDL GOAL <100: ICD-10-CM

## 2019-08-27 DIAGNOSIS — Z12.11 SPECIAL SCREENING FOR MALIGNANT NEOPLASMS, COLON: ICD-10-CM

## 2019-08-27 DIAGNOSIS — Z79.4 TYPE 2 DIABETES MELLITUS WITH STAGE 2 CHRONIC KIDNEY DISEASE, WITH LONG-TERM CURRENT USE OF INSULIN (H): Primary | ICD-10-CM

## 2019-08-27 DIAGNOSIS — N18.2 TYPE 2 DIABETES MELLITUS WITH STAGE 2 CHRONIC KIDNEY DISEASE, WITH LONG-TERM CURRENT USE OF INSULIN (H): Primary | ICD-10-CM

## 2019-08-27 DIAGNOSIS — I10 BENIGN ESSENTIAL HYPERTENSION: ICD-10-CM

## 2019-08-27 LAB
ANION GAP SERPL CALCULATED.3IONS-SCNC: 10 MMOL/L (ref 3–14)
BUN SERPL-MCNC: 15 MG/DL (ref 7–30)
CALCIUM SERPL-MCNC: 9.5 MG/DL (ref 8.5–10.1)
CHLORIDE SERPL-SCNC: 105 MMOL/L (ref 94–109)
CHOLEST SERPL-MCNC: 158 MG/DL
CO2 SERPL-SCNC: 24 MMOL/L (ref 20–32)
CREAT SERPL-MCNC: 0.97 MG/DL (ref 0.66–1.25)
GFR SERPL CREATININE-BSD FRML MDRD: >90 ML/MIN/{1.73_M2}
GLUCOSE SERPL-MCNC: 137 MG/DL (ref 70–99)
HBA1C MFR BLD: 6.9 % (ref 0–5.6)
HDLC SERPL-MCNC: 47 MG/DL
LDLC SERPL CALC-MCNC: 85 MG/DL
NONHDLC SERPL-MCNC: 111 MG/DL
POTASSIUM SERPL-SCNC: 4.1 MMOL/L (ref 3.4–5.3)
SODIUM SERPL-SCNC: 139 MMOL/L (ref 133–144)
TRIGL SERPL-MCNC: 131 MG/DL
TSH SERPL DL<=0.005 MIU/L-ACNC: 1.76 MU/L (ref 0.4–4)

## 2019-08-27 PROCEDURE — 99214 OFFICE O/P EST MOD 30 MIN: CPT | Performed by: PHYSICIAN ASSISTANT

## 2019-08-27 PROCEDURE — 80061 LIPID PANEL: CPT | Performed by: PHYSICIAN ASSISTANT

## 2019-08-27 PROCEDURE — 36415 COLL VENOUS BLD VENIPUNCTURE: CPT | Performed by: PHYSICIAN ASSISTANT

## 2019-08-27 PROCEDURE — 84443 ASSAY THYROID STIM HORMONE: CPT | Performed by: PHYSICIAN ASSISTANT

## 2019-08-27 PROCEDURE — 80048 BASIC METABOLIC PNL TOTAL CA: CPT | Performed by: PHYSICIAN ASSISTANT

## 2019-08-27 PROCEDURE — 83036 HEMOGLOBIN GLYCOSYLATED A1C: CPT | Performed by: PHYSICIAN ASSISTANT

## 2019-08-27 RX ORDER — LISINOPRIL 10 MG/1
TABLET ORAL
Qty: 90 TABLET | Refills: 1 | Status: SHIPPED | OUTPATIENT
Start: 2019-08-27 | End: 2020-04-17

## 2019-08-27 RX ORDER — ATORVASTATIN CALCIUM 10 MG/1
20 TABLET, FILM COATED ORAL DAILY
Qty: 180 TABLET | Refills: 1 | Status: SHIPPED | OUTPATIENT
Start: 2019-08-27 | End: 2020-04-17

## 2019-08-27 RX ORDER — LANCETS
EACH MISCELLANEOUS
Qty: 100 EACH | Refills: 11 | Status: SHIPPED | OUTPATIENT
Start: 2019-08-27 | End: 2023-06-08 | Stop reason: ALTCHOICE

## 2019-08-27 ASSESSMENT — MIFFLIN-ST. JEOR: SCORE: 2036.53

## 2019-08-27 NOTE — PROGRESS NOTES
Subjective     Paul Ortiz is a 49 year old male who presents to clinic today for the following health issues:    HPI   Diabetes Follow-up      How often are you checking your blood sugar? Not at all        What symptoms do you notice when your blood sugar is low?  None    What concerns do you have today about your diabetes? None     Do you have any of these symptoms? (Select all that apply)  No numbness or tingling in feet.  No redness, sores or blisters on feet.  No complaints of excessive thirst.  No reports of blurry vision.  No significant changes to weight.     Have you had a diabetic eye exam in the last 12 months? No    BP Readings from Last 2 Encounters:   08/27/19 102/74   12/20/18 120/86     Hemoglobin A1C (%)   Date Value   08/27/2019 6.9 (H)   12/20/2018 5.7 (H)     LDL Cholesterol Calculated (mg/dL)   Date Value   12/20/2018 75   02/06/2018 69       Diabetes Management Resources      How many servings of fruits and vegetables do you eat daily?  2-3    On average, how many sweetened beverages do you drink each day (soda, juice, sweet tea, etc)?   1    How many days per week do you miss taking your medication? 0            Allergies   Allergen Reactions     Zantac      Recent Labs   Lab Test 08/27/19  0822 12/20/18  0821 06/19/18  0742 02/06/18  0817 06/27/17  0743  12/29/15  0751  04/21/14  1427   A1C 6.9* 5.7* 8.3* 8.5* 7.9*   < > 11.0*   < > 7.7*   LDL  --  75  --  69 80   < > 83   < > 83   HDL  --  50  --  58 47   < > 43   < > 41   TRIG  --  87  --  133 124   < > 219*   < > 103   ALT  --   --   --   --   --   --   --   --  67   CR  --  1.04  --  0.87 0.97   < > 0.78   < > 0.98   GFRESTIMATED  --  83  --  >90 82   < > >90  Non  GFR Calc     < > 83   GFRESTBLACK  --  >90  --  >90 >90  African American GFR Calc     < > >90   GFR Calc     < > >90   POTASSIUM  --  4.7  --  4.9 4.6   < > 4.3   < > 4.5   TSH  --   --   --   --  1.51  --  1.78  --   --     < > = values in  "this interval not displayed.      BP Readings from Last 3 Encounters:   08/27/19 102/74   12/20/18 120/86   06/19/18 123/88    Wt Readings from Last 3 Encounters:   08/27/19 117 kg (258 lb)   12/20/18 117 kg (258 lb)   06/19/18 123.4 kg (272 lb)                      Reviewed and updated as needed this visit by Provider         Review of Systems   ROS COMP: Constitutional, HEENT, cardiovascular, pulmonary, GI, , musculoskeletal, neuro, skin, endocrine and psych systems are negative, except as otherwise noted.      Objective    /74   Pulse 86   Temp 97.2  F (36.2  C) (Tympanic)   Resp 18   Ht 1.778 m (5' 10\")   Wt 117 kg (258 lb)   SpO2 100%   BMI 37.02 kg/m    Body mass index is 37.02 kg/m .  Physical Exam   Eye exam - right eye normal lid, conjunctiva, cornea, pupil and fundus, left eye normal lid, conjunctiva, cornea, pupil and fundus.  Thyroid not palpable, not enlarged, no nodules detected.  CHEST:chest clear to IPPA, no tachypnea, retractions or cyanosis and S1, S2 normal, no murmur, no gallop, rate regular  Foot exam - both sides normal; no swelling, tenderness or skin or vascular lesions. Color and temperature is normal. Sensation is intact. Peripheral pulses are palpable. Toenails are normal.  .  Paul was seen today for diabetes.    Diagnoses and all orders for this visit:    Type 2 diabetes mellitus with stage 2 chronic kidney disease, with long-term current use of insulin (H)    Hyperlipidemia LDL goal <100  -     atorvastatin (LIPITOR) 10 MG tablet; Take 2 tablets (20 mg) by mouth daily  -     Lipid panel reflex to direct LDL Fasting    Benign essential hypertension  -     lisinopril (PRINIVIL/ZESTRIL) 10 MG tablet; TAKE 1 TABLET BY MOUTH EVERY DAY  -     Basic metabolic panel  (Ca, Cl, CO2, Creat, Gluc, K, Na, BUN)    Special screening for malignant neoplasms, colon  -     GASTROENTEROLOGY ADULT REF PROCEDURE ONLY [facility] [phone]; No Provider Preference    Other orders  -    "  blood glucose monitoring (ACCU-CHEK FASTCLIX) lancets; TEST DAILY AND AS NEEDED  -     dulaglutide (TRULICITY) 1.5 MG/0.5ML pen; Inject 1.5 mg Subcutaneous every 7 days  -     insulin glargine (LANTUS SOLOSTAR) 100 UNIT/ML pen; INJECT 38 UNITS SUBCUTANEOUS AT BEDTIME  -     insulin pen needle (31G X 6 MM) 31G X 6 MM miscellaneous; Use twice daily or as directed.  -     metFORMIN (GLUCOPHAGE) 1000 MG tablet; TAKE 1 TABLET (1,000 MG) BY MOUTH 2 TIMES DAILY (WITH MEALS)  -     Hemoglobin A1c  -     TSH    work on lifestyle modification  Recheck in 6 mos

## 2019-08-27 NOTE — TELEPHONE ENCOUNTER
Prescription approved per [facility] [place]. For lisinopril    Routing refill request to provider for review/approval because:  Labs not current:  Bp, microalbumin and a1c    Pt has appt on 8/27/19  Med pended for 90 day supply with reminder.

## 2019-08-27 NOTE — TELEPHONE ENCOUNTER
loly is due for a recheck. Have him make an appt to see me.call patient to help him schedule an appt

## 2019-09-09 DIAGNOSIS — E11.22 TYPE 2 DIABETES MELLITUS WITH STAGE 2 CHRONIC KIDNEY DISEASE, WITHOUT LONG-TERM CURRENT USE OF INSULIN (H): ICD-10-CM

## 2019-09-09 DIAGNOSIS — N18.2 TYPE 2 DIABETES MELLITUS WITH STAGE 2 CHRONIC KIDNEY DISEASE, WITHOUT LONG-TERM CURRENT USE OF INSULIN (H): ICD-10-CM

## 2019-09-09 NOTE — TELEPHONE ENCOUNTER
"Requested Prescriptions   Pending Prescriptions Disp Refills     TRULICITY 1.5 MG/0.5ML pen [Pharmacy Med Name: TRULICITY 1.5 MG/0.5 ML PEN]  Last Written Prescription Date:  06/13/19  Last Fill Quantity: 6,  # refills: 3   Last office visit: 8/27/2019 with prescribing provider:  SUZANNE javier   Future Office Visit:      3     Sig: INJECT 1.5 MG SUBCUTANEOUS EVERY 7 DAYS       GLP-1 Agonists Protocol Failed - 9/9/2019  1:18 AM        Failed - Microalbumin on file in past 12 months     Recent Labs   Lab Test 02/06/18  0817   MICROL 11   UMALCR 4.55             Passed - Blood pressure less than 140/90 in past 6 months     BP Readings from Last 3 Encounters:   08/27/19 102/74   12/20/18 120/86   06/19/18 123/88                 Passed - LDL on file in past 12 months     Recent Labs   Lab Test 08/27/19  0822   LDL 85             Passed - HgbA1C in past 3 or 6 months     If HgbA1C is 8 or greater, it needs to be on file within the past 3 months.  If less than 8, must be on file within the past 6 months.     Recent Labs   Lab Test 08/27/19  0822   A1C 6.9*             Passed - Medication is active on med list        Passed - Patient is age 18 or older        Passed - Normal serum creatinine on file in past 12 months     Recent Labs   Lab Test 08/27/19  0822   CR 0.97             Passed - Recent (6 mo) or future (30 days) visit within the authorizing provider's specialty     Patient had office visit in the last 6 months or has a visit in the next 30 days with authorizing provider.  See \"Patient Info\" tab in inbasket, or \"Choose Columns\" in Meds & Orders section of the refill encounter.              "

## 2019-09-10 NOTE — TELEPHONE ENCOUNTER
Direct admit from [place], referred by Dr. Hinojosa. Admitting MD is Dr. Leydi AYALA. Upon patient's arrival release signed and held orders, page admit hospitalist on call for orders.        GLIPIZIDE         Last Written Prescription Date: 9-19-17  Last Fill Quantity: 135, # refills: 0  Last Office Visit with [facility] or [facility] prescribing provider:  6-27-17        BP Readings from Last 3 Encounters:   06/27/17 114/83   01/06/17 119/86   06/30/16 108/79     Lab Results   Component Value Date    MICROL 14 01/06/2017     Lab Results   Component Value Date    UMALCR 5.17 01/06/2017     Creatinine   Date Value Ref Range Status   06/27/2017 0.97 0.66 - 1.25 mg/dL Final   ]  GFR Estimate   Date Value Ref Range Status   06/27/2017 82 >60 mL/min/1.7m2 Final     Comment:     Non  GFR Calc   01/06/2017 69 >60 mL/min/1.7m2 Final     Comment:     Non  GFR Calc   06/30/2016 77 >60 mL/min/1.7m2 Final     Comment:     Non  GFR Calc     GFR Estimate If Black   Date Value Ref Range Status   06/27/2017 >90   GFR Calc   >60 mL/min/1.7m2 Final   01/06/2017 83 >60 mL/min/1.7m2 Final     Comment:      GFR Calc   06/30/2016 >90   GFR Calc   >60 mL/min/1.7m2 Final     Lab Results   Component Value Date    CHOL 152 06/27/2017     Lab Results   Component Value Date    HDL 47 06/27/2017     Lab Results   Component Value Date    LDL 80 06/27/2017     Lab Results   Component Value Date    TRIG 124 06/27/2017     Lab Results   Component Value Date    CHOLHDLRATIO 3.7 10/24/2014     No results found for: AST  Lab Results   Component Value Date    ALT 67 04/21/2014     Lab Results   Component Value Date    A1C 7.9 06/27/2017    A1C 8.5 01/06/2017    A1C 7.2 06/30/2016    A1C 11.0 12/29/2015    A1C 7.0 10/24/2014     Potassium   Date Value Ref Range Status   06/27/2017 4.6 3.4 - 5.3 mmol/L Final

## 2019-09-10 NOTE — TELEPHONE ENCOUNTER
Routing refill request to provider for review/approval because:  Labs not current:  microalbumin

## 2019-09-11 RX ORDER — DULAGLUTIDE 1.5 MG/.5ML
INJECTION, SOLUTION SUBCUTANEOUS
Qty: 6 ML | Refills: 1 | Status: SHIPPED | OUTPATIENT
Start: 2019-09-11 | End: 2020-04-17

## 2020-02-03 ENCOUNTER — OFFICE VISIT (OUTPATIENT)
Dept: URGENT CARE | Facility: URGENT CARE | Age: 50
End: 2020-02-03
Payer: COMMERCIAL

## 2020-02-03 VITALS
WEIGHT: 258 LBS | BODY MASS INDEX: 37.02 KG/M2 | SYSTOLIC BLOOD PRESSURE: 124 MMHG | OXYGEN SATURATION: 97 % | TEMPERATURE: 98.6 F | DIASTOLIC BLOOD PRESSURE: 86 MMHG | HEART RATE: 79 BPM

## 2020-02-03 DIAGNOSIS — M54.50 ACUTE RIGHT-SIDED LOW BACK PAIN WITHOUT SCIATICA: Primary | ICD-10-CM

## 2020-02-03 LAB
ALBUMIN UR-MCNC: NEGATIVE MG/DL
APPEARANCE UR: CLEAR
BILIRUB UR QL STRIP: NEGATIVE
COLOR UR AUTO: YELLOW
GLUCOSE UR STRIP-MCNC: 500 MG/DL
HGB UR QL STRIP: NEGATIVE
KETONES UR STRIP-MCNC: ABNORMAL MG/DL
LEUKOCYTE ESTERASE UR QL STRIP: NEGATIVE
NITRATE UR QL: NEGATIVE
PH UR STRIP: 5.5 PH (ref 5–7)
SOURCE: ABNORMAL
SP GR UR STRIP: 1.03 (ref 1–1.03)
UROBILINOGEN UR STRIP-ACNC: 0.2 EU/DL (ref 0.2–1)

## 2020-02-03 PROCEDURE — 99214 OFFICE O/P EST MOD 30 MIN: CPT | Performed by: NURSE PRACTITIONER

## 2020-02-03 PROCEDURE — 81003 URINALYSIS AUTO W/O SCOPE: CPT | Performed by: NURSE PRACTITIONER

## 2020-02-03 RX ORDER — CYCLOBENZAPRINE HCL 10 MG
10 TABLET ORAL 3 TIMES DAILY PRN
Qty: 15 TABLET | Refills: 0 | Status: SHIPPED | OUTPATIENT
Start: 2020-02-03 | End: 2020-02-08

## 2020-02-03 NOTE — PROGRESS NOTES
SUBJECTIVE  HPI: Paul Ortiz is a 49 year old male who presents for evaluation of back pain  Symptoms began 1 day(s) ago, have been onset acute and are worse.  Pain is located in the middle of back right region, with radiation to does not radiate, and are at worst a 8 on a scale of 1-10.  Recent injury:none recalled by the patient. Was sitting on couch last evening shifted and felt immediate pain  Personal hx of back pain is no prior back problems.  Pain is exacerbated by: standing, walking and changing position.  Pain is relieved by: ice, rest and Tylenol  ASSOCIATED sx include: none.  Red flag symptoms: negative.  Denies urinary pain frequency or hematuria    Past Medical History:   Diagnosis Date     Diabetes type 2, uncontrolled (H)      Gout     diet controlled     HTN, goal below 140/90      Hyperlipidemia      Obesity      Current Outpatient Medications   Medication Sig Dispense Refill     aspirin 81 MG tablet Take 1 tablet by mouth daily. 30 tablet      atorvastatin (LIPITOR) 10 MG tablet Take 2 tablets (20 mg) by mouth daily 180 tablet 1     blood glucose monitoring (ACCU-CHEK FASTCLIX) lancets TEST DAILY AND AS NEEDED 100 each 11     dulaglutide (TRULICITY) 1.5 MG/0.5ML pen Inject 1.5 mg Subcutaneous every 7 days 6 mL 3     insulin glargine (LANTUS SOLOSTAR) 100 UNIT/ML pen INJECT 38 UNITS SUBCUTANEOUS AT BEDTIME 15 mL 3     insulin pen needle (31G X 6 MM) 31G X 6 MM miscellaneous Use twice daily or as directed. 100 each 11     lisinopril (PRINIVIL/ZESTRIL) 10 MG tablet TAKE 1 TABLET BY MOUTH EVERY DAY 90 tablet 1     metFORMIN (GLUCOPHAGE) 1000 MG tablet TAKE 1 TABLET (1,000 MG) BY MOUTH 2 TIMES DAILY (WITH MEALS) 180 tablet 1     order for DME Test strips for pt's glucometer: Test bid and prn. 200 each 4     order for DME Glucometer: Accu-chek Guide 1 each 0     TRULICITY 1.5 MG/0.5ML pen INJECT 1.5 MG SUBCUTANEOUS EVERY 7 DAYS 6 mL 1     Social History     Tobacco Use     Smoking status: Former  Smoker     Packs/day: 1.50     Years: 13.00     Pack years: 19.50     Types: Cigarettes     Last attempt to quit: 1997     Years since quittin.5     Smokeless tobacco: Never Used   Substance Use Topics     Alcohol use: Yes     Comment: very little       ROS:  Review of systems negative except as stated above.    OBJECTIVE:  /86   Pulse 79   Temp 98.6  F (37  C) (Oral)   Wt 117 kg (258 lb)   SpO2 97%   BMI 37.02 kg/m    Back examination: Back symmetric, no curvature. ROM normal. Right CVA tenderness  STRAIGHT leg raise test: negative  GENERAL APPEARANCE: healthy, alert and no distress  RESP: lungs clear to auscultation - no rales, rhonchi or wheezes  CV: regular rates and rhythm, normal S1 S2, no murmur noted  ABDOMEN:  soft, nontender, no HSM or masses and bowel sounds normal  NEURO: Normal strength and tone with no weakness or sensory deficit noted, reflexes normal   SKIN: no suspicious lesions or rashes    UA negative    ASSESSMENT/IMPRESSION:  (M54.5) Acute right-sided low back pain without sciatica  (primary encounter diagnosis)    PLAN:  UA negative, unlikely pyelonephritis or kidney stone  More likely muscular  Given muscle relaxer flexeril to be used PRN  Continue stretching and strengthening exercises.    Continue prn heat or ice application.  Follow up if symptoms not improving or worsening    JEFF Lindquist CNP

## 2020-02-24 ENCOUNTER — HEALTH MAINTENANCE LETTER (OUTPATIENT)
Age: 50
End: 2020-02-24

## 2020-04-06 DIAGNOSIS — E11.22 TYPE 2 DIABETES MELLITUS WITH STAGE 2 CHRONIC KIDNEY DISEASE, WITHOUT LONG-TERM CURRENT USE OF INSULIN (H): ICD-10-CM

## 2020-04-06 DIAGNOSIS — N18.2 TYPE 2 DIABETES MELLITUS WITH STAGE 2 CHRONIC KIDNEY DISEASE, WITHOUT LONG-TERM CURRENT USE OF INSULIN (H): ICD-10-CM

## 2020-04-06 NOTE — TELEPHONE ENCOUNTER
Requested Prescriptions   Pending Prescriptions Disp Refills     insulin glargine (LANTUS SOLOSTAR) 100 UNIT/ML pen 15 mL 3     Sig: INJECT 38 UNITS SUBCUTANEOUS AT BEDTIME   Last Written Prescription Date:  1-8-20  Last Fill Quantity: 15,  # refills: 3   Last office visit: 8/27/2019 with prescribing provider:  8-27-19   Future Office Visit:      There is no refill protocol information for this order

## 2020-04-07 NOTE — TELEPHONE ENCOUNTER
Lab Results   Component Value Date    A1C 6.9 08/27/2019    A1C 5.7 12/20/2018    A1C 8.3 06/19/2018    A1C 8.5 02/06/2018    A1C 7.9 06/27/2017     Routing refill request to provider for review/approval because:  Labs not current:  A1c not within past 6 months.    Last OV with Dominik: 8/27/2019 with recheck due in 6 months.    Stacie Liu, RN, BSN

## 2020-04-17 ENCOUNTER — VIRTUAL VISIT (OUTPATIENT)
Dept: FAMILY MEDICINE | Facility: CLINIC | Age: 50
End: 2020-04-17
Payer: COMMERCIAL

## 2020-04-17 DIAGNOSIS — N18.2 TYPE 2 DIABETES MELLITUS WITH STAGE 2 CHRONIC KIDNEY DISEASE, WITHOUT LONG-TERM CURRENT USE OF INSULIN (H): ICD-10-CM

## 2020-04-17 DIAGNOSIS — E78.5 HYPERLIPIDEMIA LDL GOAL <100: ICD-10-CM

## 2020-04-17 DIAGNOSIS — E11.22 TYPE 2 DIABETES MELLITUS WITH STAGE 2 CHRONIC KIDNEY DISEASE, WITHOUT LONG-TERM CURRENT USE OF INSULIN (H): ICD-10-CM

## 2020-04-17 DIAGNOSIS — I10 BENIGN ESSENTIAL HYPERTENSION: ICD-10-CM

## 2020-04-17 PROCEDURE — 99214 OFFICE O/P EST MOD 30 MIN: CPT | Mod: TEL | Performed by: PHYSICIAN ASSISTANT

## 2020-04-17 RX ORDER — LISINOPRIL 10 MG/1
TABLET ORAL
Qty: 90 TABLET | Refills: 1 | Status: SHIPPED | OUTPATIENT
Start: 2020-04-17 | End: 2020-07-28

## 2020-04-17 RX ORDER — ATORVASTATIN CALCIUM 10 MG/1
20 TABLET, FILM COATED ORAL DAILY
Qty: 180 TABLET | Refills: 1 | Status: SHIPPED | OUTPATIENT
Start: 2020-04-17 | End: 2020-07-28

## 2020-04-17 NOTE — PROGRESS NOTES
"Paul Ortiz is a 50 year old male who is being evaluated via a billable telephone visit.      The patient has been notified of following:     \"This telephone visit will be conducted via a call between you and your physician/provider. We have found that certain health care needs can be provided without the need for a physical exam.  This service lets us provide the care you need with a short phone conversation.  If a prescription is necessary we can send it directly to your pharmacy.  If lab work is needed we can place an order for that and you can then stop by our lab to have the test done at a later time.    Telephone visits are billed at different rates depending on your insurance coverage. During this emergency period, for some insurers they may be billed the same as an in-person visit.  Please reach out to your insurance provider with any questions\".    Patient has given verbal consent for Telephone visit?  Yes    How would you like to obtain your AVS? Reuben Schulz     Paul Ortiz is a 50 year old male who presents to clinic today for the following health issues:    Diabetes Follow-up    How often are you checking your blood sugar? One time daily  What time of day are you checking your blood sugars (select all that apply)?  once in the morning  Have you had any blood sugars above 200?  No  Have you had any blood sugars below 70?  No    What symptoms do you notice when your blood sugar is low?  Heart races, shakey    What concerns do you have today about your diabetes? None     Do you have any of these symptoms? (Select all that apply)  No numbness or tingling in feet.  No redness, sores or blisters on feet.  No complaints of excessive thirst.  No reports of blurry vision.  No significant changes to weight.    Have you had a diabetic eye exam in the last 12 months? Yes-  Location: [facility], [place]    Overall feeling well. Healthy.   Under a lot of work stress.   AM: variable . Range " from 100-150.   Less active. Will work more on a walking program  Watching his diet. Was doing keto, now is simply being more carb conscious.       BP Readings from Last 2 Encounters:   20 124/86   19 102/74     Hemoglobin A1C (%)   Date Value   2019 6.9 (H)   2018 5.7 (H)     LDL Cholesterol Calculated (mg/dL)   Date Value   2019 85   2018 75           How many servings of fruits and vegetables do you eat daily?  2-3    On average, how many sweetened beverages do you drink each day (Examples: soda, juice, sweet tea, etc.  Do NOT count diet or artificially sweetened beverages)?   0    How many days per week do you exercise enough to make your heart beat faster? 3 or less    How many minutes a day do you exercise enough to make your heart beat faster? 9 or less    How many days per week do you miss taking your medication? 0      Patient Active Problem List   Diagnosis     Gout     Obesity     Health Care Home     Hyperlipidemia LDL goal <100     Type 2 diabetes, HbA1c goal < 7% (H)     Essential hypertension with goal blood pressure less than 140/90     Type 2 diabetes mellitus with stage 2 chronic kidney disease, with long-term current use of insulin (H)     Class 2 obesity with serious comorbidity and body mass index (BMI) of 38.0 to 38.9 in adult, unspecified obesity type     Morbid obesity (H)     Past Surgical History:   Procedure Laterality Date     HYDROCELECTOMY SCROTAL  2012    Procedure:HYDROCELECTOMY SCROTAL; Left hydrocelectomy; Surgeon:ROSEANNA BAUTISTA; Location: OR       Social History     Tobacco Use     Smoking status: Former Smoker     Packs/day: 1.50     Years: 13.00     Pack years: 19.50     Types: Cigarettes     Last attempt to quit: 1997     Years since quittin.7     Smokeless tobacco: Never Used   Substance Use Topics     Alcohol use: Yes     Comment: very little     Family History   Problem Relation Age of Onset     Diabetes Father       Hypertension Mother      Diabetes Maternal Grandmother      [email or web address]. Maternal Grandfather          at 72, for aortic aneurism and CABGx2         Current Outpatient Medications   Medication Sig Dispense Refill     aspirin 81 MG tablet Take 1 tablet by mouth daily. 30 tablet      atorvastatin (LIPITOR) 10 MG tablet Take 2 tablets (20 mg) by mouth daily 180 tablet 1     dulaglutide (TRULICITY) 1.5 MG/0.5ML pen Inject 1.5 mg Subcutaneous every 7 days 6 mL 3     insulin glargine (LANTUS SOLOSTAR) 100 UNIT/ML pen INJECT 38 UNITS SUBCUTANEOUS AT BEDTIME**DUE for Diabetes follow up** 15 mL 0     lisinopril (PRINIVIL/ZESTRIL) 10 MG tablet TAKE 1 TABLET BY MOUTH EVERY DAY 90 tablet 1     metFORMIN (GLUCOPHAGE) 1000 MG tablet TAKE 1 TABLET (1,000 MG) BY MOUTH 2 TIMES DAILY (WITH MEALS) 180 tablet 1     blood glucose monitoring (ACCU-CHEK FASTCLIX) lancets TEST DAILY AND AS NEEDED 100 each 11     insulin pen needle (31G X 6 MM) 31G X 6 MM miscellaneous Use twice daily or as directed. 100 each 11     order for DME Test strips for pt's glucometer: Test bid and prn. 200 each 4     order for DME Glucometer: Accu-chek Guide 1 each 0     Allergies   Allergen Reactions     Zantac      Recent Labs   Lab Test 19  0822 18  0821 18  0742 18  0817 17  0743  14  1427   A1C 6.9* 5.7* 8.3* 8.5* 7.9*   < > 7.7*   LDL 85 75  --  69 80   < > 83   HDL 47 50  --  58 47   < > 41   TRIG 131 87  --  133 124   < > 103   ALT  --   --   --   --   --   --  67   CR 0.97 1.04  --  0.87 0.97   < > 0.98   GFRESTIMATED >90 83  --  >90 82   < > 83   GFRESTBLACK >90 >90  --  >90 >90  African American GFR Calc     < > >90   POTASSIUM 4.1 4.7  --  4.9 4.6   < > 4.5   TSH 1.76  --   --   --  1.51   < >  --     < > = values in this interval not displayed.      BP Readings from Last 3 Encounters:   20 124/86   19 102/74   18 120/86    Wt Readings from Last 3 Encounters:   20 117 kg (258 lb)   19 117  kg (258 lb)   12/20/18 117 kg (258 lb)                    Reviewed and updated as needed this visit by Provider         Review of Systems   ROS COMP: Constitutional, HEENT, cardiovascular, pulmonary, GI, , musculoskeletal, neuro, skin, endocrine and psych systems are negative, except as otherwise noted.       Objective   Reported vitals:  There were no vitals taken for this visit.   healthy, alert and no distress  PSYCH: Alert and oriented times 3; coherent speech, normal   rate and volume, able to articulate logical thoughts, able   to abstract reason, no tangential thoughts, no hallucinations   or delusions  His affect is normal  RESP: No cough, no audible wheezing, able to talk in full sentences  Remainder of exam unable to be completed due to telephone visits    Diagnostic Test Results:  Labs reviewed in Epic        Assessment/Plan:  1. Hyperlipidemia LDL goal <100  work on lifestyle modification    - atorvastatin (LIPITOR) 10 MG tablet; Take 2 tablets (20 mg) by mouth daily  Dispense: 180 tablet; Refill: 1    2. Type 2 diabetes mellitus with stage 2 chronic kidney disease, without long-term current use of insulin (H)  work on lifestyle modification    - dulaglutide (TRULICITY) 1.5 MG/0.5ML pen; Inject 1.5 mg Subcutaneous every 7 days  Dispense: 6 mL; Refill: 0  - insulin glargine (LANTUS SOLOSTAR) 100 UNIT/ML pen; INJECT 38 UNITS SUBCUTANEOUS AT BEDTIME  Dispense: 15 mL; Refill: 0  - metFORMIN (GLUCOPHAGE) 1000 MG tablet; TAKE 1 TABLET (1,000 MG) BY MOUTH 2 TIMES DAILY (WITH MEALS)  Dispense: 180 tablet; Refill: 0    3. Benign essential hypertension  work on lifestyle modification    - lisinopril (ZESTRIL) 10 MG tablet; TAKE 1 TABLET BY MOUTH EVERY DAY  Dispense: 90 tablet; Refill: 1    Recheck in 3 mos .    Phone call duration:  17 minutes    Dominik Thomas PA-C

## 2020-04-27 DIAGNOSIS — E78.5 HYPERLIPIDEMIA LDL GOAL <100: ICD-10-CM

## 2020-04-27 NOTE — TELEPHONE ENCOUNTER
"Requested Prescriptions   Pending Prescriptions Disp Refills     atorvastatin (LIPITOR) 10 MG tablet 180 tablet 1     Sig: Take 2 tablets (20 mg) by mouth daily   Last Written Prescription Date:  01/27/20  Last Fill Quantity: 90,  # refills: 1   Last office visit: 4/17/2020 with prescribing provider:  SUZANNE Thomas   Future Office Visit:        Statins Protocol Passed - 4/27/2020  2:21 PM        Passed - LDL on file in past 12 months     Recent Labs   Lab Test 08/27/19  0822   LDL 85             Passed - No abnormal creatine kinase in past 12 months     No lab results found.             Passed - Recent (12 mo) or future (30 days) visit within the authorizing provider's specialty     Patient has had an office visit with the authorizing provider or a provider within the authorizing providers department within the previous 12 mos or has a future within next 30 days. See \"Patient Info\" tab in inbasket, or \"Choose Columns\" in Meds & Orders section of the refill encounter.              Passed - Medication is active on med list        Passed - Patient is age 18 or older             "

## 2020-04-29 RX ORDER — ATORVASTATIN CALCIUM 10 MG/1
20 TABLET, FILM COATED ORAL DAILY
Qty: 180 TABLET | Refills: 1 | OUTPATIENT
Start: 2020-04-29

## 2020-05-13 DIAGNOSIS — E11.22 TYPE 2 DIABETES MELLITUS WITH STAGE 2 CHRONIC KIDNEY DISEASE, WITHOUT LONG-TERM CURRENT USE OF INSULIN (H): ICD-10-CM

## 2020-05-13 DIAGNOSIS — N18.2 TYPE 2 DIABETES MELLITUS WITH STAGE 2 CHRONIC KIDNEY DISEASE, WITHOUT LONG-TERM CURRENT USE OF INSULIN (H): ICD-10-CM

## 2020-05-14 NOTE — TELEPHONE ENCOUNTER
Lab Results   Component Value Date    A1C 6.9 08/27/2019    A1C 5.7 12/20/2018    A1C 8.3 06/19/2018    A1C 8.5 02/06/2018    A1C 7.9 06/27/2017     Routing refill request to provider for review/approval because:  Labs not current:  A1c    Last OV with Dominik: 8/27/2019 with advised F/U in 6 months for Diabetes recheck.    Stacie Liu, RN, BSN

## 2020-06-22 DIAGNOSIS — E11.22 TYPE 2 DIABETES MELLITUS WITH STAGE 2 CHRONIC KIDNEY DISEASE, WITHOUT LONG-TERM CURRENT USE OF INSULIN (H): ICD-10-CM

## 2020-06-22 DIAGNOSIS — N18.2 TYPE 2 DIABETES MELLITUS WITH STAGE 2 CHRONIC KIDNEY DISEASE, WITHOUT LONG-TERM CURRENT USE OF INSULIN (H): ICD-10-CM

## 2020-06-24 NOTE — TELEPHONE ENCOUNTER
Routing refill request to provider for review/approval because:  Labs out of range/not current:  A1C    Hemoglobin A1C   Date Value Ref Range Status   08/27/2019 6.9 (H) 0 - 5.6 % Final     Comment:     Normal <5.7% Prediabetes 5.7-6.4%  Diabetes 6.5% or higher - adopted from ADA   consensus guidelines.         Last Written Prescription Date:  5/18/20  Last Fill Quantity: 15 ml ,  # refills: 0   Virtual visit with Dominik Thomas last on 4/17/20 with plan to return in 3 months.  (around 7/17/20)  Future Office Visit:  None    Megha Hyman RN BSN

## 2020-06-25 NOTE — TELEPHONE ENCOUNTER
Rx for insulin glargine (LANTUS SOLOSTAR) 100 UNIT/ML pen called into [facility] verbally, appt with Dominik PHAM on 07/28/20.

## 2020-06-25 NOTE — TELEPHONE ENCOUNTER
Remind loly to schedule a face to face visit with me in July for a recheck of his diabetes and to update lab work.

## 2020-06-27 DIAGNOSIS — E11.22 TYPE 2 DIABETES MELLITUS WITH STAGE 2 CHRONIC KIDNEY DISEASE, WITHOUT LONG-TERM CURRENT USE OF INSULIN (H): ICD-10-CM

## 2020-06-27 DIAGNOSIS — N18.2 TYPE 2 DIABETES MELLITUS WITH STAGE 2 CHRONIC KIDNEY DISEASE, WITHOUT LONG-TERM CURRENT USE OF INSULIN (H): ICD-10-CM

## 2020-07-28 ENCOUNTER — OFFICE VISIT (OUTPATIENT)
Dept: FAMILY MEDICINE | Facility: CLINIC | Age: 50
End: 2020-07-28
Payer: COMMERCIAL

## 2020-07-28 VITALS
RESPIRATION RATE: 20 BRPM | HEART RATE: 74 BPM | SYSTOLIC BLOOD PRESSURE: 116 MMHG | WEIGHT: 264.4 LBS | DIASTOLIC BLOOD PRESSURE: 85 MMHG | OXYGEN SATURATION: 95 % | BODY MASS INDEX: 37.85 KG/M2 | TEMPERATURE: 97.6 F | HEIGHT: 70 IN

## 2020-07-28 DIAGNOSIS — Z12.11 COLON CANCER SCREENING: ICD-10-CM

## 2020-07-28 DIAGNOSIS — N18.2 TYPE 2 DIABETES MELLITUS WITH STAGE 2 CHRONIC KIDNEY DISEASE, WITHOUT LONG-TERM CURRENT USE OF INSULIN (H): ICD-10-CM

## 2020-07-28 DIAGNOSIS — N18.2 TYPE 2 DIABETES MELLITUS WITH STAGE 2 CHRONIC KIDNEY DISEASE, WITH LONG-TERM CURRENT USE OF INSULIN (H): ICD-10-CM

## 2020-07-28 DIAGNOSIS — I10 BENIGN ESSENTIAL HYPERTENSION: ICD-10-CM

## 2020-07-28 DIAGNOSIS — E78.5 HYPERLIPIDEMIA LDL GOAL <100: Primary | ICD-10-CM

## 2020-07-28 DIAGNOSIS — Z79.4 TYPE 2 DIABETES MELLITUS WITH STAGE 2 CHRONIC KIDNEY DISEASE, WITH LONG-TERM CURRENT USE OF INSULIN (H): ICD-10-CM

## 2020-07-28 DIAGNOSIS — I10 ESSENTIAL HYPERTENSION WITH GOAL BLOOD PRESSURE LESS THAN 140/90: ICD-10-CM

## 2020-07-28 DIAGNOSIS — E11.22 TYPE 2 DIABETES MELLITUS WITH STAGE 2 CHRONIC KIDNEY DISEASE, WITHOUT LONG-TERM CURRENT USE OF INSULIN (H): ICD-10-CM

## 2020-07-28 DIAGNOSIS — E11.22 TYPE 2 DIABETES MELLITUS WITH STAGE 2 CHRONIC KIDNEY DISEASE, WITH LONG-TERM CURRENT USE OF INSULIN (H): ICD-10-CM

## 2020-07-28 LAB
ANION GAP SERPL CALCULATED.3IONS-SCNC: 7 MMOL/L (ref 3–14)
BUN SERPL-MCNC: 16 MG/DL (ref 7–30)
CALCIUM SERPL-MCNC: 9 MG/DL (ref 8.5–10.1)
CHLORIDE SERPL-SCNC: 103 MMOL/L (ref 94–109)
CHOLEST SERPL-MCNC: 160 MG/DL
CO2 SERPL-SCNC: 27 MMOL/L (ref 20–32)
CREAT SERPL-MCNC: 0.94 MG/DL (ref 0.66–1.25)
CREAT UR-MCNC: 143 MG/DL
GFR SERPL CREATININE-BSD FRML MDRD: >90 ML/MIN/{1.73_M2}
GLUCOSE SERPL-MCNC: 168 MG/DL (ref 70–99)
HBA1C MFR BLD: 8.5 % (ref 0–5.6)
HDLC SERPL-MCNC: 40 MG/DL
LDLC SERPL CALC-MCNC: 95 MG/DL
MICROALBUMIN UR-MCNC: 6 MG/L
MICROALBUMIN/CREAT UR: 4.06 MG/G CR (ref 0–17)
NONHDLC SERPL-MCNC: 120 MG/DL
POTASSIUM SERPL-SCNC: 4.3 MMOL/L (ref 3.4–5.3)
SODIUM SERPL-SCNC: 137 MMOL/L (ref 133–144)
TRIGL SERPL-MCNC: 127 MG/DL

## 2020-07-28 PROCEDURE — 80048 BASIC METABOLIC PNL TOTAL CA: CPT | Performed by: PHYSICIAN ASSISTANT

## 2020-07-28 PROCEDURE — 36415 COLL VENOUS BLD VENIPUNCTURE: CPT | Performed by: PHYSICIAN ASSISTANT

## 2020-07-28 PROCEDURE — 83036 HEMOGLOBIN GLYCOSYLATED A1C: CPT | Performed by: PHYSICIAN ASSISTANT

## 2020-07-28 PROCEDURE — 82043 UR ALBUMIN QUANTITATIVE: CPT | Performed by: PHYSICIAN ASSISTANT

## 2020-07-28 PROCEDURE — 99214 OFFICE O/P EST MOD 30 MIN: CPT | Performed by: PHYSICIAN ASSISTANT

## 2020-07-28 PROCEDURE — 80061 LIPID PANEL: CPT | Performed by: PHYSICIAN ASSISTANT

## 2020-07-28 PROCEDURE — 99207 C FOOT EXAM  NO CHARGE: CPT | Performed by: PHYSICIAN ASSISTANT

## 2020-07-28 RX ORDER — ATORVASTATIN CALCIUM 10 MG/1
20 TABLET, FILM COATED ORAL DAILY
Qty: 180 TABLET | Refills: 1 | Status: SHIPPED | OUTPATIENT
Start: 2020-07-28 | End: 2021-01-04

## 2020-07-28 RX ORDER — LISINOPRIL 10 MG/1
TABLET ORAL
Qty: 90 TABLET | Refills: 1 | Status: SHIPPED | OUTPATIENT
Start: 2020-07-28 | End: 2021-01-04

## 2020-07-28 ASSESSMENT — MIFFLIN-ST. JEOR: SCORE: 2068.05

## 2020-07-28 NOTE — PROGRESS NOTES
"Subjective     Paul Ortiz is a 50 year old male who presents to clinic today for the following health issues:    HPI       Diabetes Follow-up    How often are you checking your blood sugar? One time daily  What time of day are you checking your blood sugars (select all that apply)?  every morning  Have you had any blood sugars above 200?  Yes a couple  Have you had any blood sugars below 70?  No    What symptoms do you notice when your blood sugar is low?  None and Not applicable    What concerns do you have today about your diabetes? Stress seems to affect diabetes     Do you have any of these symptoms? (Select all that apply)  No numbness or tingling in feet.  No redness, sores or blisters on feet.  No complaints of excessive thirst.  No reports of blurry vision.  No significant changes to weight.    Have you had a diabetic eye exam in the last 12 months? Yes- Date of last eye exam: [facility] - March 2020      No polyuria/dipsia. Some fatigue.  No chest pain/sob/palps.  Sugars in the AM : 160-170's.   Dealing with a lot of stress. Lack of motivation . Less activity. Eating poorly.     Hyperlipidemia Follow-Up      Are you regularly taking any medication or supplement to lower your cholesterol?   Yes- Atorvastatin    Are you having muscle aches or other side effects that you think could be caused by your cholesterol lowering medication?  No    Hypertension Follow-up      Do you check your blood pressure regularly outside of the clinic? No     Are you following a low salt diet? Yes - \"not a big salt eater\"    Are your blood pressures ever more than 140 on the top number (systolic) OR more   than 90 on the bottom number (diastolic), for example 140/90? No    BP Readings from Last 2 Encounters:   07/28/20 116/85   02/03/20 124/86     Hemoglobin A1C (%)   Date Value   07/28/2020 8.5 (H)   08/27/2019 6.9 (H)     LDL Cholesterol Calculated (mg/dL)   Date Value   08/27/2019 85   12/20/2018 75 "         How many servings of fruits and vegetables do you eat daily?  2-3    On average, how many sweetened beverages do you drink each day (Examples: soda, juice, sweet tea, etc.  Do NOT count diet or artificially sweetened beverages)?   0    How many days per week do you exercise enough to make your heart beat faster? 3 or less    How many minutes a day do you exercise enough to make your heart beat faster? 9 or less    How many days per week do you miss taking your medication? 0        Patient Active Problem List   Diagnosis     Gout     Obesity     Health Care Home     Hyperlipidemia LDL goal <100     Type 2 diabetes, HbA1c goal < 7% (H)     Essential hypertension with goal blood pressure less than 140/90     Type 2 diabetes mellitus with stage 2 chronic kidney disease, with long-term current use of insulin (H)     Class 2 obesity with serious comorbidity and body mass index (BMI) of 38.0 to 38.9 in adult, unspecified obesity type     Morbid obesity (H)     Past Surgical History:   Procedure Laterality Date     HYDROCELECTOMY SCROTAL  2012    Procedure:HYDROCELECTOMY SCROTAL; Left hydrocelectomy; Surgeon:ROSEANNA BAUTISTA; Location: OR       Social History     Tobacco Use     Smoking status: Former Smoker     Packs/day: 1.50     Years: 13.00     Pack years: 19.50     Types: Cigarettes     Last attempt to quit: 1997     Years since quittin.9     Smokeless tobacco: Never Used   Substance Use Topics     Alcohol use: Yes     Comment: very little     Family History   Problem Relation Age of Onset     Diabetes Father      Hypertension Mother      Diabetes Maternal Grandmother      C.A.D. Maternal Grandfather          at 72, for aortic aneurism and CABGx2         Current Outpatient Medications   Medication Sig Dispense Refill     aspirin 81 MG tablet Take 1 tablet by mouth daily. 30 tablet      atorvastatin (LIPITOR) 10 MG tablet Take 2 tablets (20 mg) by mouth daily 180 tablet 1     dulaglutide  (TRULICITY) 1.5 MG/0.5ML pen Inject 1.5 mg Subcutaneous every 7 days 6 mL 0     insulin glargine (LANTUS SOLOSTAR) 100 UNIT/ML pen INJECT 38 UNITS SUBCUTANEOUS AT BEDTIME 15 mL 0     lisinopril (ZESTRIL) 10 MG tablet TAKE 1 TABLET BY MOUTH EVERY DAY 90 tablet 1     metFORMIN (GLUCOPHAGE) 1000 MG tablet TAKE 1 TABLET (1,000 MG) BY MOUTH 2 TIMES DAILY (WITH MEALS) 180 tablet 0     blood glucose monitoring (ACCU-CHEK FASTCLIX) lancets TEST DAILY AND AS NEEDED 100 each 11     insulin pen needle (31G X 6 MM) 31G X 6 MM miscellaneous Use twice daily or as directed. 100 each 11     order for DME Test strips for pt's glucometer: Test bid and prn. 200 each 4     order for DME Glucometer: Accu-chek Guide 1 each 0     Allergies   Allergen Reactions     Zantac      Recent Labs   Lab Test 07/28/20  0717 08/27/19  0822 12/20/18  0821  02/06/18  0817 06/27/17  0743  04/21/14  1427   A1C 8.5* 6.9* 5.7*   < > 8.5* 7.9*   < > 7.7*   LDL  --  85 75  --  69 80   < > 83   HDL  --  47 50  --  58 47   < > 41   TRIG  --  131 87  --  133 124   < > 103   ALT  --   --   --   --   --   --   --  67   CR  --  0.97 1.04  --  0.87 0.97   < > 0.98   GFRESTIMATED  --  >90 83  --  >90 82   < > 83   GFRESTBLACK  --  >90 >90  --  >90 >90  African American GFR Calc     < > >90   POTASSIUM  --  4.1 4.7  --  4.9 4.6   < > 4.5   TSH  --  1.76  --   --   --  1.51   < >  --     < > = values in this interval not displayed.      BP Readings from Last 3 Encounters:   07/28/20 116/85   02/03/20 124/86   08/27/19 102/74    Wt Readings from Last 3 Encounters:   07/28/20 119.9 kg (264 lb 6.4 oz)   02/03/20 117 kg (258 lb)   08/27/19 117 kg (258 lb)                    Reviewed and updated as needed this visit by Provider         Review of Systems   Constitutional, HEENT, cardiovascular, pulmonary, GI, , musculoskeletal, neuro, skin, endocrine and psych systems are negative, except as otherwise noted.      Objective    /85   Pulse 74   Temp 97.6  F (36.4  " C) (Tympanic)   Resp 20   Ht 1.79 m (5' 10.47\")   Wt 119.9 kg (264 lb 6.4 oz)   SpO2 95%   BMI 37.43 kg/m    Body mass index is 37.43 kg/m .  Physical Exam   Eye exam - right eye normal lid, conjunctiva, cornea, pupil and fundus, left eye normal lid, conjunctiva, cornea, pupil and fundus.  Thyroid not palpable, not enlarged, no nodules detected.  CHEST:chest clear to IPPA, no tachypnea, retractions or cyanosis and S1, S2 normal, no murmur, no gallop, rate regular.  Foot exam - both sides normal; no swelling, tenderness or skin or vascular lesions. Color and temperature is normal. Sensation is intact. Peripheral pulses are palpable. Toenails are normal.    Paul was seen today for diabetes, hypertension and hyperlipidemia.    Diagnoses and all orders for this visit:    Hyperlipidemia LDL goal <100  -     Lipid panel reflex to direct LDL Fasting  -     atorvastatin (LIPITOR) 10 MG tablet; Take 2 tablets (20 mg) by mouth daily    Type 2 diabetes mellitus with stage 2 chronic kidney disease, with long-term current use of insulin (H)  -     Albumin Random Urine Quantitative with Creat Ratio  -     HEMOGLOBIN A1C  -     OPTOMETRY REFERRAL  -     JUST IN CASE  -     FOOT EXAM    Essential hypertension with goal blood pressure less than 140/90  -     BASIC METABOLIC PANEL  -     JUST IN CASE    Colon cancer screening  -     GASTROENTEROLOGY ADULT REF PROCEDURE ONLY; Future    Type 2 diabetes mellitus with stage 2 chronic kidney disease, without long-term current use of insulin (H)  -     metFORMIN (GLUCOPHAGE) 1000 MG tablet; TAKE 1 TABLET (1,000 MG) BY MOUTH 2 TIMES DAILY (WITH MEALS)  -     dulaglutide (TRULICITY) 1.5 MG/0.5ML pen; Inject 1.5 mg Subcutaneous every 7 days  -     insulin glargine (LANTUS SOLOSTAR) 100 UNIT/ML pen; Inject 45 Units Subcutaneous At Bedtime INJECT 38 UNITS SUBCUTANEOUS AT BEDTIME    Benign essential hypertension  -     lisinopril (ZESTRIL) 10 MG tablet; TAKE 1 TABLET BY MOUTH EVERY " DAY      Increase lantus from 38 to 45 units nightly.  work on lifestyle modification  Recheck in 3 mos.

## 2020-08-06 DIAGNOSIS — N18.2 TYPE 2 DIABETES MELLITUS WITH STAGE 2 CHRONIC KIDNEY DISEASE, WITHOUT LONG-TERM CURRENT USE OF INSULIN (H): ICD-10-CM

## 2020-08-06 DIAGNOSIS — E11.22 TYPE 2 DIABETES MELLITUS WITH STAGE 2 CHRONIC KIDNEY DISEASE, WITHOUT LONG-TERM CURRENT USE OF INSULIN (H): ICD-10-CM

## 2020-08-06 NOTE — TELEPHONE ENCOUNTER
Requested Prescriptions   Pending Prescriptions Disp Refills     insulin glargine (LANTUS SOLOSTAR) 100 UNIT/ML pen 15 mL 2     Sig: Inject 45 Units Subcutaneous At Bedtime INJECT 38 UNITS SUBCUTANEOUS AT BEDTIME   Last Written Prescription Date:  7-5-20  Last Fill Quantity: 15,  # refills: 0   Last office visit: 7/28/2020 with prescribing provider:  7-28-20   Future Office Visit:            There is no refill protocol information for this order

## 2020-08-09 NOTE — TELEPHONE ENCOUNTER
"Routing refill request to provider for review/approval because:  Provider increased insulin at last visit on 7/28/20 to 45 units nightly. Sig had 2 instructions written, sig updated to 45 units nighty.   Pended for approval.           Requested Prescriptions   Pending Prescriptions Disp Refills     insulin glargine (LANTUS SOLOSTAR) 100 UNIT/ML pen 15 mL 2     Sig: Inject 45 Units Subcutaneous At Bedtime INJECT 38 UNITS SUBCUTANEOUS AT BEDTIME       Long Acting Insulin Protocol Passed - 8/6/2020 10:58 AM        Passed - Serum creatinine on file in past 12 months     Recent Labs   Lab Test 07/28/20  0717   CR 0.94       Ok to refill medication if creatinine is low          Passed - HgbA1C in past 3 or 6 months     If HgbA1C is 8 or greater, it needs to be on file within the past 3 months.  If less than 8, must be on file within the past 6 months.     Recent Labs   Lab Test 07/28/20  0717   A1C 8.5*             Passed - Medication is active on med list        Passed - Patient is age 18 or older        Passed - Recent (6 mo) or future (30 days) visit within the authorizing provider's specialty     Patient had office visit in the last 6 months or has a visit in the next 30 days with authorizing provider or within the authorizing provider's specialty.  See \"Patient Info\" tab in inbasket, or \"Choose Columns\" in Meds & Orders section of the refill encounter.                 "

## 2020-10-23 DIAGNOSIS — N18.2 TYPE 2 DIABETES MELLITUS WITH STAGE 2 CHRONIC KIDNEY DISEASE, WITHOUT LONG-TERM CURRENT USE OF INSULIN (H): ICD-10-CM

## 2020-10-23 DIAGNOSIS — E11.22 TYPE 2 DIABETES MELLITUS WITH STAGE 2 CHRONIC KIDNEY DISEASE, WITHOUT LONG-TERM CURRENT USE OF INSULIN (H): ICD-10-CM

## 2020-10-23 NOTE — TELEPHONE ENCOUNTER
Routing refill request to provider for review/approval because:  Labs out of range:  hgb a1c  Tawny Manning BSN, RN

## 2020-10-26 ENCOUNTER — TELEPHONE (OUTPATIENT)
Dept: FAMILY MEDICINE | Facility: CLINIC | Age: 50
End: 2020-10-26

## 2020-10-26 DIAGNOSIS — N18.2 TYPE 2 DIABETES MELLITUS WITH STAGE 2 CHRONIC KIDNEY DISEASE, WITHOUT LONG-TERM CURRENT USE OF INSULIN (H): Primary | ICD-10-CM

## 2020-10-26 DIAGNOSIS — E11.22 TYPE 2 DIABETES MELLITUS WITH STAGE 2 CHRONIC KIDNEY DISEASE, WITHOUT LONG-TERM CURRENT USE OF INSULIN (H): Primary | ICD-10-CM

## 2020-10-27 NOTE — TELEPHONE ENCOUNTER
Spoke with patient, advised he is due for diabetes recheck; last a1x 7/28.  Pt wants to look at his schedule and call back to make appt.

## 2020-11-15 DIAGNOSIS — N18.2 TYPE 2 DIABETES MELLITUS WITH STAGE 2 CHRONIC KIDNEY DISEASE, WITHOUT LONG-TERM CURRENT USE OF INSULIN (H): ICD-10-CM

## 2020-11-15 DIAGNOSIS — E11.22 TYPE 2 DIABETES MELLITUS WITH STAGE 2 CHRONIC KIDNEY DISEASE, WITHOUT LONG-TERM CURRENT USE OF INSULIN (H): ICD-10-CM

## 2020-11-16 NOTE — TELEPHONE ENCOUNTER
Routing refill request to provider for review/approval because:  Labs not current:  A1C    Claritza MICHAUDN, RN

## 2020-11-17 NOTE — TELEPHONE ENCOUNTER
Left message for patient to call back pod 2 line.([phone])  Please schedule physical/med check

## 2020-12-13 ENCOUNTER — HEALTH MAINTENANCE LETTER (OUTPATIENT)
Age: 50
End: 2020-12-13

## 2020-12-15 DIAGNOSIS — N18.2 TYPE 2 DIABETES MELLITUS WITH STAGE 2 CHRONIC KIDNEY DISEASE, WITHOUT LONG-TERM CURRENT USE OF INSULIN (H): ICD-10-CM

## 2020-12-15 DIAGNOSIS — E11.22 TYPE 2 DIABETES MELLITUS WITH STAGE 2 CHRONIC KIDNEY DISEASE, WITHOUT LONG-TERM CURRENT USE OF INSULIN (H): ICD-10-CM

## 2020-12-16 NOTE — TELEPHONE ENCOUNTER
Routing refill request to provider for review/approval because:  Tati given x1 and patient did not follow up, please advise  Called patient and left a message to return our call to the clinic.     *Central scheduling: if patient calls back please assist with scheduling an appointment.*    Thank you.   Selina Jasmine RN

## 2020-12-21 DIAGNOSIS — E11.22 TYPE 2 DIABETES MELLITUS WITH STAGE 2 CHRONIC KIDNEY DISEASE, WITHOUT LONG-TERM CURRENT USE OF INSULIN (H): ICD-10-CM

## 2020-12-21 DIAGNOSIS — N18.2 TYPE 2 DIABETES MELLITUS WITH STAGE 2 CHRONIC KIDNEY DISEASE, WITHOUT LONG-TERM CURRENT USE OF INSULIN (H): ICD-10-CM

## 2020-12-23 NOTE — TELEPHONE ENCOUNTER
Routing refill request to provider for review/approval because:  Labs not current:  a1c  Patient needs to be seen because:  Was due 10/28/20    Medication pended for approval, 30 day supply with reminder.

## 2021-01-04 ENCOUNTER — OFFICE VISIT (OUTPATIENT)
Dept: FAMILY MEDICINE | Facility: CLINIC | Age: 51
End: 2021-01-04
Payer: COMMERCIAL

## 2021-01-04 VITALS
BODY MASS INDEX: 36.61 KG/M2 | RESPIRATION RATE: 18 BRPM | DIASTOLIC BLOOD PRESSURE: 88 MMHG | HEART RATE: 85 BPM | SYSTOLIC BLOOD PRESSURE: 121 MMHG | OXYGEN SATURATION: 96 % | HEIGHT: 71 IN | TEMPERATURE: 98 F | WEIGHT: 261.5 LBS

## 2021-01-04 DIAGNOSIS — Z11.4 SCREENING FOR HIV (HUMAN IMMUNODEFICIENCY VIRUS): ICD-10-CM

## 2021-01-04 DIAGNOSIS — E11.22 TYPE 2 DIABETES MELLITUS WITH STAGE 2 CHRONIC KIDNEY DISEASE, WITH LONG-TERM CURRENT USE OF INSULIN (H): Primary | ICD-10-CM

## 2021-01-04 DIAGNOSIS — Z79.4 TYPE 2 DIABETES MELLITUS WITH STAGE 2 CHRONIC KIDNEY DISEASE, WITH LONG-TERM CURRENT USE OF INSULIN (H): Primary | ICD-10-CM

## 2021-01-04 DIAGNOSIS — Z12.11 COLON CANCER SCREENING: ICD-10-CM

## 2021-01-04 DIAGNOSIS — N18.2 TYPE 2 DIABETES MELLITUS WITH STAGE 2 CHRONIC KIDNEY DISEASE, WITH LONG-TERM CURRENT USE OF INSULIN (H): Primary | ICD-10-CM

## 2021-01-04 DIAGNOSIS — E78.5 HYPERLIPIDEMIA LDL GOAL <100: ICD-10-CM

## 2021-01-04 DIAGNOSIS — E66.01 MORBID OBESITY (H): ICD-10-CM

## 2021-01-04 DIAGNOSIS — E11.22 TYPE 2 DIABETES MELLITUS WITH STAGE 2 CHRONIC KIDNEY DISEASE, WITHOUT LONG-TERM CURRENT USE OF INSULIN (H): ICD-10-CM

## 2021-01-04 DIAGNOSIS — I10 BENIGN ESSENTIAL HYPERTENSION: ICD-10-CM

## 2021-01-04 DIAGNOSIS — Z11.59 NEED FOR HEPATITIS C SCREENING TEST: ICD-10-CM

## 2021-01-04 DIAGNOSIS — N18.2 TYPE 2 DIABETES MELLITUS WITH STAGE 2 CHRONIC KIDNEY DISEASE, WITHOUT LONG-TERM CURRENT USE OF INSULIN (H): ICD-10-CM

## 2021-01-04 DIAGNOSIS — I10 ESSENTIAL HYPERTENSION WITH GOAL BLOOD PRESSURE LESS THAN 140/90: ICD-10-CM

## 2021-01-04 LAB
ALBUMIN UR-MCNC: NEGATIVE MG/DL
APPEARANCE UR: CLEAR
BILIRUB UR QL STRIP: ABNORMAL
CHOLEST SERPL-MCNC: 152 MG/DL
COLOR UR AUTO: YELLOW
GLUCOSE UR STRIP-MCNC: 250 MG/DL
HBA1C MFR BLD: 9.1 % (ref 0–5.6)
HCV AB SERPL QL IA: REACTIVE
HDLC SERPL-MCNC: 46 MG/DL
HGB UR QL STRIP: NEGATIVE
KETONES UR STRIP-MCNC: ABNORMAL MG/DL
LDLC SERPL CALC-MCNC: 71 MG/DL
LEUKOCYTE ESTERASE UR QL STRIP: NEGATIVE
NITRATE UR QL: NEGATIVE
NONHDLC SERPL-MCNC: 106 MG/DL
PH UR STRIP: 5 PH (ref 5–7)
SOURCE: ABNORMAL
SP GR UR STRIP: 1.02 (ref 1–1.03)
TRIGL SERPL-MCNC: 175 MG/DL
UROBILINOGEN UR STRIP-ACNC: 1 EU/DL (ref 0.2–1)

## 2021-01-04 PROCEDURE — 99214 OFFICE O/P EST MOD 30 MIN: CPT | Performed by: PHYSICIAN ASSISTANT

## 2021-01-04 PROCEDURE — 36415 COLL VENOUS BLD VENIPUNCTURE: CPT | Performed by: PHYSICIAN ASSISTANT

## 2021-01-04 PROCEDURE — 81003 URINALYSIS AUTO W/O SCOPE: CPT | Performed by: PHYSICIAN ASSISTANT

## 2021-01-04 PROCEDURE — 86803 HEPATITIS C AB TEST: CPT | Performed by: PHYSICIAN ASSISTANT

## 2021-01-04 PROCEDURE — 83036 HEMOGLOBIN GLYCOSYLATED A1C: CPT | Performed by: PHYSICIAN ASSISTANT

## 2021-01-04 PROCEDURE — 87389 HIV-1 AG W/HIV-1&-2 AB AG IA: CPT | Performed by: PHYSICIAN ASSISTANT

## 2021-01-04 PROCEDURE — 99207 PR FOOT EXAM NO CHARGE: CPT | Performed by: PHYSICIAN ASSISTANT

## 2021-01-04 PROCEDURE — 87522 HEPATITIS C REVRS TRNSCRPJ: CPT | Performed by: PHYSICIAN ASSISTANT

## 2021-01-04 PROCEDURE — 80061 LIPID PANEL: CPT | Performed by: PHYSICIAN ASSISTANT

## 2021-01-04 RX ORDER — LISINOPRIL 10 MG/1
TABLET ORAL
Qty: 90 TABLET | Refills: 1 | Status: SHIPPED | OUTPATIENT
Start: 2021-01-04 | End: 2021-04-14

## 2021-01-04 RX ORDER — GLIPIZIDE 10 MG/1
10 TABLET, FILM COATED, EXTENDED RELEASE ORAL DAILY
Qty: 90 TABLET | Refills: 0 | Status: SHIPPED | OUTPATIENT
Start: 2021-01-04 | End: 2021-03-30

## 2021-01-04 RX ORDER — ATORVASTATIN CALCIUM 10 MG/1
20 TABLET, FILM COATED ORAL DAILY
Qty: 180 TABLET | Refills: 1 | Status: SHIPPED | OUTPATIENT
Start: 2021-01-04 | End: 2021-04-14

## 2021-01-04 ASSESSMENT — MIFFLIN-ST. JEOR: SCORE: 2055.35

## 2021-01-04 NOTE — PROGRESS NOTES
Subjective     Paul Ortiz is a 50 year old male who presents to clinic today for the following health issues     HPI       Diabetes Follow-up    How often are you checking your blood sugar? One time daily  What time of day are you checking your blood sugars (select all that apply)?  Before meals  Have you had any blood sugars above 200?  No  Have you had any blood sugars below 70?  No    What symptoms do you notice when your blood sugar is low?  None    What concerns do you have today about your diabetes? None     Do you have any of these symptoms? (Select all that apply)  No numbness or tingling in feet.  No redness, sores or blisters on feet.  No complaints of excessive thirst.  No reports of blurry vision.  No significant changes to weight.    Have you had a diabetic eye exam in the last 12 months? Yes- Date of last eye exam: with in the past year,  Location: [facility]       Checking sugars occasionally.  Numbers have been up and down. Typically, low to mid 100's.   Still not active. Busy with work.       Hyperlipidemia Follow-Up      Are you regularly taking any medication or supplement to lower your cholesterol?   Yes- .    Are you having muscle aches or other side effects that you think could be caused by your cholesterol lowering medication?  No    Hypertension Follow-up  Stable. Tolerating meds.     Do you check your blood pressure regularly outside of the clinic? No     Are you following a low salt diet? No    Are your blood pressures ever more than 140 on the top number (systolic) OR more   than 90 on the bottom number (diastolic), for example 140/90? No    BP Readings from Last 2 Encounters:   01/04/21 121/88   07/28/20 116/85     Hemoglobin A1C (%)   Date Value   07/28/2020 8.5 (H)   08/27/2019 6.9 (H)     LDL Cholesterol Calculated (mg/dL)   Date Value   07/28/2020 95   08/27/2019 85         How many servings of fruits and vegetables do you eat daily?  2-3    On average, how many  "sweetened beverages do you drink each day (Examples: soda, juice, sweet tea, etc.  Do NOT count diet or artificially sweetened beverages)?   0    How many days per week do you exercise enough to make your heart beat faster? 3 or less    How many minutes a day do you exercise enough to make your heart beat faster? 9 or less    How many days per week do you miss taking your medication? 0    Recheck of obesity.  Plans to work more on lifestyle changes to help aid in weight loss.     Review of Systems   Constitutional, HEENT, cardiovascular, pulmonary, GI, , musculoskeletal, neuro, skin, endocrine and psych systems are negative, except as otherwise noted.      Objective    /88   Pulse 85   Temp 98  F (36.7  C) (Tympanic)   Resp 18   Ht 1.791 m (5' 10.5\")   Wt 118.6 kg (261 lb 8 oz)   SpO2 96%   BMI 36.99 kg/m    Body mass index is 36.99 kg/m .  Physical Exam   Eye exam - right eye normal lid, conjunctiva, cornea, pupil and fundus, left eye normal lid, conjunctiva, cornea, pupil and fundus.  Thyroid not palpable, not enlarged, no nodules detected.  CHEST:chest clear to IPPA, no tachypnea, retractions or cyanosis and S1, S2 normal, no murmur, no gallop, rate regular.  Foot exam - both sides normal; no swelling, tenderness or skin or vascular lesions. Color and temperature is normal. Sensation is intact. Peripheral pulses are palpable. Toenails are normal.        Paul was seen today for recheck medication.    Diagnoses and all orders for this visit:    Type 2 diabetes mellitus with stage 2 chronic kidney disease, with long-term current use of insulin (H)    Screening for HIV (human immunodeficiency virus)  -     HIV Antigen Antibody Combo  -     Hemoglobin A1c  -     Lipid Profile (Chol, Trig, HDL, LDL calc)  -     UA reflex to Microscopic and Culture  -     FOOT EXAM  -     JUST IN CASE    Need for hepatitis C screening test  -     Hepatitis C Screen Reflex to HCV RNA Quant and Genotype  -     Hemoglobin " A1c  -     Lipid Profile (Chol, Trig, HDL, LDL calc)  -     UA reflex to Microscopic and Culture  -     FOOT EXAM  -     JUST IN CASE    Essential hypertension with goal blood pressure less than 140/90    Hyperlipidemia LDL goal <100  -     atorvastatin (LIPITOR) 10 MG tablet; Take 2 tablets (20 mg) by mouth daily    Colon cancer screening  -     GASTROENTEROLOGY ADULT REF PROCEDURE ONLY; Future    Morbid obesity (H)    Type 2 diabetes mellitus with stage 2 chronic kidney disease, without long-term current use of insulin (H)  -     insulin glargine (LANTUS SOLOSTAR) 100 UNIT/ML pen; Inject 60 Units Subcutaneous At Bedtime  -     dulaglutide (TRULICITY) 1.5 MG/0.5ML pen; Inject 1.5 mg Subcutaneous every 7 days  -     metFORMIN (GLUCOPHAGE) 1000 MG tablet; TAKE 1 TABLET BY MOUTH TWICE A DAY WITH MEALS  -     AMBULATORY ADULT DIABETES EDUCATOR REFERRAL; Future  -     glipiZIDE (GLUCOTROL XL) 10 MG 24 hr tablet; Take 1 tablet (10 mg) by mouth daily  -     blood glucose (NO BRAND SPECIFIED) test strip; Use to test blood sugar 2 times daily or as directed.    Benign essential hypertension  -     lisinopril (ZESTRIL) 10 MG tablet; TAKE 1 TABLET BY MOUTH EVERY DAY      Inc lantus from 45 to 60 units  Start glipizide.     work on lifestyle modification  Recheck in 3 mos.

## 2021-01-05 LAB — HIV 1+2 AB+HIV1 P24 AG SERPL QL IA: NONREACTIVE

## 2021-01-06 ENCOUNTER — TELEPHONE (OUTPATIENT)
Dept: FAMILY MEDICINE | Facility: CLINIC | Age: 51
End: 2021-01-06
Payer: COMMERCIAL

## 2021-01-06 NOTE — TELEPHONE ENCOUNTER
Patient has questions regarding the Hep C result that he sees in MyChart.   Hepatitis C Antibody NR^Nonreactive ReactiveAbnormal      Please call to discuss.

## 2021-01-09 LAB
HCV RNA SERPL NAA+PROBE-ACNC: NORMAL [IU]/ML
HCV RNA SERPL NAA+PROBE-LOG IU: NORMAL LOG IU/ML

## 2021-03-29 DIAGNOSIS — N18.2 TYPE 2 DIABETES MELLITUS WITH STAGE 2 CHRONIC KIDNEY DISEASE, WITHOUT LONG-TERM CURRENT USE OF INSULIN (H): ICD-10-CM

## 2021-03-29 DIAGNOSIS — E11.22 TYPE 2 DIABETES MELLITUS WITH STAGE 2 CHRONIC KIDNEY DISEASE, WITHOUT LONG-TERM CURRENT USE OF INSULIN (H): ICD-10-CM

## 2021-03-30 RX ORDER — GLIPIZIDE 10 MG/1
10 TABLET, FILM COATED, EXTENDED RELEASE ORAL DAILY
Qty: 30 TABLET | Refills: 0 | Status: SHIPPED | OUTPATIENT
Start: 2021-03-30 | End: 2021-04-14

## 2021-03-30 NOTE — TELEPHONE ENCOUNTER
Routing refill request to provider for review/approval because:   Patient needs to be seen because:  Return in about 3 months (around 4/4/2021) for diabetes recheck.  Pended for #30 with appointment reminder    Lab Results   Component Value Date    A1C 9.1 01/04/2021    A1C 8.5 07/28/2020    A1C 6.9 08/27/2019    A1C 5.7 12/20/2018    A1C 8.3 06/19/2018

## 2021-04-02 DIAGNOSIS — E11.22 TYPE 2 DIABETES MELLITUS WITH STAGE 2 CHRONIC KIDNEY DISEASE, WITHOUT LONG-TERM CURRENT USE OF INSULIN (H): ICD-10-CM

## 2021-04-02 DIAGNOSIS — N18.2 TYPE 2 DIABETES MELLITUS WITH STAGE 2 CHRONIC KIDNEY DISEASE, WITHOUT LONG-TERM CURRENT USE OF INSULIN (H): ICD-10-CM

## 2021-04-08 ENCOUNTER — TRANSFERRED RECORDS (OUTPATIENT)
Dept: HEALTH INFORMATION MANAGEMENT | Facility: CLINIC | Age: 51
End: 2021-04-08

## 2021-04-08 LAB
ALT SERPL-CCNC: 235 IU/L (ref 8–45)
AST SERPL-CCNC: 351 IU/L (ref 2–40)
CREAT SERPL-MCNC: 1.05 MG/DL (ref 0.72–1.25)
GFR SERPL CREATININE-BSD FRML MDRD: >60 ML/MIN/1.73M2
GLUCOSE SERPL-MCNC: 143 MG/DL (ref 65–100)
HBA1C MFR BLD: 7.9 % (ref 0–5.7)
POTASSIUM SERPL-SCNC: 4 MMOL/L (ref 3.5–5)

## 2021-04-12 ENCOUNTER — OFFICE VISIT (OUTPATIENT)
Dept: FAMILY MEDICINE | Facility: CLINIC | Age: 51
End: 2021-04-12
Payer: COMMERCIAL

## 2021-04-12 VITALS
OXYGEN SATURATION: 95 % | BODY MASS INDEX: 37.17 KG/M2 | DIASTOLIC BLOOD PRESSURE: 84 MMHG | HEIGHT: 71 IN | HEART RATE: 75 BPM | WEIGHT: 265.5 LBS | SYSTOLIC BLOOD PRESSURE: 130 MMHG | TEMPERATURE: 99 F | RESPIRATION RATE: 18 BRPM

## 2021-04-12 DIAGNOSIS — Z09 FOLLOW-UP EXAMINATION FOLLOWING SURGERY: Primary | ICD-10-CM

## 2021-04-12 PROCEDURE — 99214 OFFICE O/P EST MOD 30 MIN: CPT | Performed by: PHYSICIAN ASSISTANT

## 2021-04-12 ASSESSMENT — ENCOUNTER SYMPTOMS
HEMATURIA: 0
DIZZINESS: 0
DYSURIA: 0
FEVER: 0
NAUSEA: 0
WEAKNESS: 0
SHORTNESS OF BREATH: 0
LIGHT-HEADEDNESS: 0
VOMITING: 0
COUGH: 1
NERVOUS/ANXIOUS: 0
CONSTIPATION: 0
ABDOMINAL PAIN: 1
DIARRHEA: 0
HEMATOCHEZIA: 0

## 2021-04-12 ASSESSMENT — MIFFLIN-ST. JEOR: SCORE: 2068.49

## 2021-04-12 ASSESSMENT — PAIN SCALES - GENERAL: PAINLEVEL: NO PAIN (0)

## 2021-04-12 NOTE — PATIENT INSTRUCTIONS
You are improving as expected after surgery.  I would like you to continue to avoid using Norco.  For discomfort please use Tylenol as indicated on the bottle.  Please continue to take Colace to prevent constipation.    -Call and schedule your follow-up visit with your surgeon.  -Contact your surgeons clinic regarding returning to work next week as well as work travel.    If you develop severe symptoms such as fevers, redness, sweats at night, severe pain, it is important you go to the emergency department or contact your surgeon's office for further recommendations.

## 2021-04-12 NOTE — PROGRESS NOTES
Assessment & Plan     Follow-up examination following surgery  Patient is a 51-year-old male presents to clinic for postoperative follow-up.  Patient underwent laparoscopic cholecystectomy April 9, 2021.  Pain is improving.  Patient is not no longer using narcotic pain medications and is using Tylenol for pain control.  Bowels are functioning normally.  Vital signs normal.  Physical exam significant for mild generalized abdominal tenderness palpation without rebound, guarding, erythema, or rash.    Patient is progressing as expected.  No signs of infection.  Reminded patient to schedule postoperative appointment with surgical team for recheck.  Patient to follow-up with surgical team for recommendations regarding postoperative return to work/activity.    See Patient Instructions    Return if symptoms worsen or fail to improve.    Brenna Serrano PA-C  [facility] JOHN Miller is a 51 year old who presents for the following health issues     HPI     - Follow up after undergoing laparoscopic cholecystectomy 4/9/2021. He notes that he is sore and he has some bloating after eating. No nausea, emesis or diarrhea. No urinary sx. He notes he had first BM yesterday since surgery, this was normal. No blood in BM. Patient prescribed Norco, but is not taking this. Patient has been compliant with Colace. Patient is using 1-2 tablets of Tylenol for pain control. Patient has had two BM's     Per chart review, patient underwent laparoscopic cholecystectomy 4/9/21.     Review of Systems   Constitutional: Negative for fever.   HENT: Negative for congestion.    Respiratory: Positive for cough. Negative for shortness of breath.    Cardiovascular: Negative for chest pain.   Gastrointestinal: Positive for abdominal pain (RUQ, Improving ). Negative for constipation, diarrhea, hematochezia, nausea and vomiting.   Genitourinary: Negative for dysuria and hematuria.   Skin: Negative for rash.   Neurological:  "Negative for dizziness, weakness and light-headedness.   Psychiatric/Behavioral: The patient is not nervous/anxious.             Objective    /84   Pulse 75   Temp 99  F (37.2  C) (Tympanic)   Resp 18   Ht 1.791 m (5' 10.5\")   Wt 120.4 kg (265 lb 8 oz)   SpO2 95%   BMI 37.56 kg/m    Body mass index is 37.56 kg/m .  Physical Exam  Vitals signs and nursing note reviewed.   Constitutional:       General: He is not in acute distress.     Appearance: Normal appearance.   HENT:      Head: Normocephalic and atraumatic.      Mouth/Throat:      Mouth: Mucous membranes are moist.      Pharynx: Oropharynx is clear.   Eyes:      Extraocular Movements: Extraocular movements intact.      Pupils: Pupils are equal, round, and reactive to light.   Neck:      Musculoskeletal: Normal range of motion.   Cardiovascular:      Rate and Rhythm: Normal rate and regular rhythm.      Heart sounds: Normal heart sounds.   Pulmonary:      Effort: Pulmonary effort is normal.      Breath sounds: Normal breath sounds.   Abdominal:      General: Bowel sounds are normal.      Palpations: Abdomen is soft.      Tenderness: There is abdominal tenderness (Mild, generalized). There is no guarding or rebound.      Comments: Abdominal incisions with Steri-Strips in place, Band-Aids over top.  No erythema, rash.   Musculoskeletal: Normal range of motion.   Skin:     General: Skin is warm and dry.   Neurological:      General: No focal deficit present.      Mental Status: He is alert.   Psychiatric:         Mood and Affect: Mood normal.         Behavior: Behavior normal.                          "

## 2021-04-14 ENCOUNTER — OFFICE VISIT (OUTPATIENT)
Dept: FAMILY MEDICINE | Facility: CLINIC | Age: 51
End: 2021-04-14
Payer: COMMERCIAL

## 2021-04-14 VITALS
BODY MASS INDEX: 36.98 KG/M2 | DIASTOLIC BLOOD PRESSURE: 82 MMHG | HEART RATE: 78 BPM | OXYGEN SATURATION: 97 % | RESPIRATION RATE: 20 BRPM | WEIGHT: 261.4 LBS | SYSTOLIC BLOOD PRESSURE: 122 MMHG | TEMPERATURE: 98.2 F

## 2021-04-14 DIAGNOSIS — Z90.49 S/P LAPAROSCOPIC CHOLECYSTECTOMY: ICD-10-CM

## 2021-04-14 DIAGNOSIS — I10 ESSENTIAL HYPERTENSION WITH GOAL BLOOD PRESSURE LESS THAN 140/90: ICD-10-CM

## 2021-04-14 DIAGNOSIS — I10 BENIGN ESSENTIAL HYPERTENSION: ICD-10-CM

## 2021-04-14 DIAGNOSIS — E11.22 TYPE 2 DIABETES MELLITUS WITH STAGE 2 CHRONIC KIDNEY DISEASE, WITHOUT LONG-TERM CURRENT USE OF INSULIN (H): Primary | ICD-10-CM

## 2021-04-14 DIAGNOSIS — E78.5 HYPERLIPIDEMIA LDL GOAL <100: ICD-10-CM

## 2021-04-14 DIAGNOSIS — N18.2 TYPE 2 DIABETES MELLITUS WITH STAGE 2 CHRONIC KIDNEY DISEASE, WITHOUT LONG-TERM CURRENT USE OF INSULIN (H): Primary | ICD-10-CM

## 2021-04-14 LAB
ALBUMIN SERPL-MCNC: 3.9 G/DL (ref 3.4–5)
ALP SERPL-CCNC: 97 U/L (ref 40–150)
ALT SERPL W P-5'-P-CCNC: 177 U/L (ref 0–70)
ANION GAP SERPL CALCULATED.3IONS-SCNC: 5 MMOL/L (ref 3–14)
AST SERPL W P-5'-P-CCNC: 54 U/L (ref 0–45)
BILIRUB SERPL-MCNC: 1.2 MG/DL (ref 0.2–1.3)
BUN SERPL-MCNC: 14 MG/DL (ref 7–30)
CALCIUM SERPL-MCNC: 9.3 MG/DL (ref 8.5–10.1)
CHLORIDE SERPL-SCNC: 106 MMOL/L (ref 94–109)
CO2 SERPL-SCNC: 25 MMOL/L (ref 20–32)
CREAT SERPL-MCNC: 1.01 MG/DL (ref 0.66–1.25)
GFR SERPL CREATININE-BSD FRML MDRD: 85 ML/MIN/{1.73_M2}
GLUCOSE SERPL-MCNC: 99 MG/DL (ref 70–99)
HBA1C MFR BLD: 7.5 % (ref 0–5.6)
POTASSIUM SERPL-SCNC: 4.6 MMOL/L (ref 3.4–5.3)
PROT SERPL-MCNC: 8 G/DL (ref 6.8–8.8)
SODIUM SERPL-SCNC: 136 MMOL/L (ref 133–144)

## 2021-04-14 PROCEDURE — 99214 OFFICE O/P EST MOD 30 MIN: CPT | Performed by: PHYSICIAN ASSISTANT

## 2021-04-14 PROCEDURE — 80053 COMPREHEN METABOLIC PANEL: CPT | Performed by: PHYSICIAN ASSISTANT

## 2021-04-14 PROCEDURE — 83036 HEMOGLOBIN GLYCOSYLATED A1C: CPT | Performed by: PHYSICIAN ASSISTANT

## 2021-04-14 PROCEDURE — 36415 COLL VENOUS BLD VENIPUNCTURE: CPT | Performed by: PHYSICIAN ASSISTANT

## 2021-04-14 RX ORDER — GLIPIZIDE 10 MG/1
20 TABLET, FILM COATED, EXTENDED RELEASE ORAL DAILY
Qty: 180 TABLET | Refills: 0 | Status: SHIPPED | OUTPATIENT
Start: 2021-04-14 | End: 2021-04-26

## 2021-04-14 RX ORDER — ATORVASTATIN CALCIUM 10 MG/1
20 TABLET, FILM COATED ORAL DAILY
Qty: 180 TABLET | Refills: 1 | Status: SHIPPED | OUTPATIENT
Start: 2021-04-14 | End: 2021-09-09

## 2021-04-14 RX ORDER — LISINOPRIL 10 MG/1
TABLET ORAL
Qty: 90 TABLET | Refills: 1 | Status: SHIPPED | OUTPATIENT
Start: 2021-04-14 | End: 2021-09-09

## 2021-04-14 NOTE — PROGRESS NOTES
Jazz Miller is a 51 year old who presents for the following health issues    HPI     Diabetes Follow-up    How often are you checking your blood sugar? One to two times daily  What time of day are you checking your blood sugars (select all that apply)?  every mornig  Have you had any blood sugars above 200?  No  Have you had any blood sugars below 70?  No    What symptoms do you notice when your blood sugar is low?  None and Not applicable    What concerns do you have today about your diabetes? None     Do you have any of these symptoms? (Select all that apply)  No numbness or tingling in feet.  No redness, sores or blisters on feet.  No complaints of excessive thirst.  No reports of blurry vision.  No significant changes to weight.    Have you had a diabetic eye exam in the last 12 months? Yes- Date of last eye exam: 2020,  Location: [facility]    AM sugars in the 100 - 160 .      BP Readings from Last 2 Encounters:   04/14/21 122/82   04/12/21 130/84     Hemoglobin A1C (%)   Date Value   04/14/2021 7.5 (H)   01/04/2021 9.1 (H)     LDL Cholesterol Calculated (mg/dL)   Date Value   01/04/2021 71   07/28/2020 95           How many servings of fruits and vegetables do you eat daily?  0-1    On average, how many sweetened beverages do you drink each day (Examples: soda, juice, sweet tea, etc.  Do NOT count diet or artificially sweetened beverages)?   0    How many days per week do you exercise enough to make your heart beat faster? 3 or less    How many minutes a day do you exercise enough to make your heart beat faster? 9 or less    How many days per week do you miss taking your medication? 0    Needs to have post op labs done - per surgeon    Review of Systems   Constitutional, HEENT, cardiovascular, pulmonary, GI, , musculoskeletal, neuro, skin, endocrine and psych systems are negative, except as otherwise noted.      Objective    /82   Pulse 78   Temp 98.2  F (36.8  C) (Tympanic)   Resp  20   Wt 118.6 kg (261 lb 6.4 oz)   SpO2 97%   BMI 36.98 kg/m    Body mass index is 36.98 kg/m .  Physical Exam   Eye exam - right eye normal lid, conjunctiva, cornea, pupil and fundus, left eye normal lid, conjunctiva, cornea, pupil and fundus.  Thyroid not palpable, not enlarged, no nodules detected.  CHEST:chest clear to IPPA, no tachypnea, retractions or cyanosis and S1, S2 normal, no murmur, no gallop, rate regular.  The abdomen is soft without tenderness, guarding, mass, rebound or organomegaly. Bowel sounds are normal. No CVA tenderness or inguinal adenopathy noted.  Incision sites are healing well.   Paul was seen today for diabetes.    Diagnoses and all orders for this visit:    Type 2 diabetes mellitus with stage 2 chronic kidney disease, without long-term current use of insulin (H)  -     Hemoglobin A1c  -     JUST IN CASE  -     Comprehensive metabolic panel (BMP + Alb, Alk Phos, ALT, AST, Total. Bili, TP)  -     glipiZIDE (GLUCOTROL XL) 10 MG 24 hr tablet; Take 2 tablets (20 mg) by mouth daily Needs to be seen for further refills  -     insulin glargine (LANTUS SOLOSTAR) 100 UNIT/ML pen; Inject 60 Units Subcutaneous At Bedtime  -     metFORMIN (GLUCOPHAGE) 1000 MG tablet; TAKE 1 TABLET BY MOUTH TWICE A DAY WITH MEALS  -     dulaglutide (TRULICITY) 1.5 MG/0.5ML pen; Inject 1.5 mg Subcutaneous every 7 days    Essential hypertension with goal blood pressure less than 140/90  -     JUST IN CASE  -     Comprehensive metabolic panel (BMP + Alb, Alk Phos, ALT, AST, Total. Bili, TP)    S/P laparoscopic cholecystectomy  -     Comprehensive metabolic panel (BMP + Alb, Alk Phos, ALT, AST, Total. Bili, TP)    Benign essential hypertension  -     lisinopril (ZESTRIL) 10 MG tablet; TAKE 1 TABLET BY MOUTH EVERY DAY    Hyperlipidemia LDL goal <100  -     atorvastatin (LIPITOR) 10 MG tablet; Take 2 tablets (20 mg) by mouth daily      Advised supportive and symptomatic treatment.  Follow up with Provider - if  condition persists or worsens.   work on lifestyle modification  Recheck in 3-4 mos

## 2021-04-16 ENCOUNTER — TELEPHONE (OUTPATIENT)
Dept: FAMILY MEDICINE | Facility: CLINIC | Age: 51
End: 2021-04-16

## 2021-04-16 NOTE — TELEPHONE ENCOUNTER
Panel Management Review    Summary:    Patient is due/failing the following:   COLONOSCOPY and PHYSICAL    Type of outreach:    Sent "[employer]" message.                                                                                                                                  Nerissa Gabriel CMA     Chart routed to Care Team .

## 2021-04-16 NOTE — TELEPHONE ENCOUNTER
Panel Management Review    Summary:    Patient is due/failing the following:   COLONOSCOPY    Type of outreach:    Sent [employer] message.                                                                                                                                  Nerissa Gabriel CMA     Chart routed to Care Team .

## 2021-04-17 ENCOUNTER — HEALTH MAINTENANCE LETTER (OUTPATIENT)
Age: 51
End: 2021-04-17

## 2021-04-24 DIAGNOSIS — E11.22 TYPE 2 DIABETES MELLITUS WITH STAGE 2 CHRONIC KIDNEY DISEASE, WITHOUT LONG-TERM CURRENT USE OF INSULIN (H): ICD-10-CM

## 2021-04-24 DIAGNOSIS — N18.2 TYPE 2 DIABETES MELLITUS WITH STAGE 2 CHRONIC KIDNEY DISEASE, WITHOUT LONG-TERM CURRENT USE OF INSULIN (H): ICD-10-CM

## 2021-04-26 RX ORDER — GLIPIZIDE 10 MG/1
TABLET, FILM COATED, EXTENDED RELEASE ORAL
Qty: 30 TABLET | Refills: 0 | Status: SHIPPED | OUTPATIENT
Start: 2021-04-26 | End: 2021-07-19

## 2021-05-19 DIAGNOSIS — E11.22 TYPE 2 DIABETES MELLITUS WITH STAGE 2 CHRONIC KIDNEY DISEASE, WITHOUT LONG-TERM CURRENT USE OF INSULIN (H): ICD-10-CM

## 2021-05-19 DIAGNOSIS — N18.2 TYPE 2 DIABETES MELLITUS WITH STAGE 2 CHRONIC KIDNEY DISEASE, WITHOUT LONG-TERM CURRENT USE OF INSULIN (H): ICD-10-CM

## 2021-05-20 RX ORDER — FLURBIPROFEN SODIUM 0.3 MG/ML
SOLUTION/ DROPS OPHTHALMIC
Qty: 200 EACH | Refills: 1 | Status: SHIPPED | OUTPATIENT
Start: 2021-05-20 | End: 2022-01-14

## 2021-07-15 DIAGNOSIS — E11.22 TYPE 2 DIABETES MELLITUS WITH STAGE 2 CHRONIC KIDNEY DISEASE, WITHOUT LONG-TERM CURRENT USE OF INSULIN (H): ICD-10-CM

## 2021-07-15 DIAGNOSIS — N18.2 TYPE 2 DIABETES MELLITUS WITH STAGE 2 CHRONIC KIDNEY DISEASE, WITHOUT LONG-TERM CURRENT USE OF INSULIN (H): ICD-10-CM

## 2021-07-16 RX ORDER — INSULIN GLARGINE 100 [IU]/ML
INJECTION, SOLUTION SUBCUTANEOUS
Qty: 15 ML | Refills: 0 | Status: SHIPPED | OUTPATIENT
Start: 2021-07-16 | End: 2021-08-13

## 2021-07-16 NOTE — TELEPHONE ENCOUNTER
"Requested Prescriptions   Pending Prescriptions Disp Refills     LANTUS SOLOSTAR 100 UNIT/ML soln [Pharmacy Med Name: LANTUS SOLOSTAR 100 UNIT/ML]  2     Sig: INJECT 60 UNITS SUBCUTANEOUS AT BEDTIME       Long Acting Insulin Protocol Passed - 7/15/2021  2:12 PM        Passed - Serum creatinine on file in past 12 months     Recent Labs   Lab Test 04/14/21  1007   CR 1.01       Ok to refill medication if creatinine is low          Passed - HgbA1C in past 3 or 6 months     If HgbA1C is 8 or greater, it needs to be on file within the past 3 months.  If less than 8, must be on file within the past 6 months.     Recent Labs   Lab Test 04/14/21  1007   A1C 7.5*             Passed - Medication is active on med list        Passed - Patient is age 18 or older        Passed - Recent (6 mo) or future (30 days) visit within the authorizing provider's specialty     Patient had office visit in the last 6 months or has a visit in the next 30 days with authorizing provider or within the authorizing provider's specialty.  See \"Patient Info\" tab in inbasket, or \"Choose Columns\" in Meds & Orders section of the refill encounter.                 "

## 2021-07-16 NOTE — TELEPHONE ENCOUNTER
See patient's additional [employer] request for lantus.    Plan for follow up:          Instructions       Return in about 3 months (around 7/14/2021) for diabetes recheck.      Prescription approved per [facility] Refill Protocol.    Routed [employer] message back to patient advising he schedule a diabetes visit.    Yaneli Hart RN  [facility]       Quality 131: Pain Assessment And Follow-Up: Pain assessment using a standardized tool is documented as negative, no follow-up plan required Quality 130: Documentation Of Current Medications In The Medical Record: Current Medications Documented Additional Notes: Patient states pain is negative, and on a scale of 0 - 10 the pain level is 0. Detail Level: Detailed Quality 474: Zoster Vaccination Status: Shingrix Vaccination not Administered or Previously Received, Reason not Otherwise Specified

## 2021-07-18 DIAGNOSIS — N18.2 TYPE 2 DIABETES MELLITUS WITH STAGE 2 CHRONIC KIDNEY DISEASE, WITHOUT LONG-TERM CURRENT USE OF INSULIN (H): ICD-10-CM

## 2021-07-18 DIAGNOSIS — E11.22 TYPE 2 DIABETES MELLITUS WITH STAGE 2 CHRONIC KIDNEY DISEASE, WITHOUT LONG-TERM CURRENT USE OF INSULIN (H): ICD-10-CM

## 2021-07-19 RX ORDER — GLIPIZIDE 10 MG/1
TABLET, FILM COATED, EXTENDED RELEASE ORAL
Qty: 30 TABLET | Refills: 0 | Status: SHIPPED | OUTPATIENT
Start: 2021-07-19 | End: 2021-08-26

## 2021-08-24 DIAGNOSIS — E11.22 TYPE 2 DIABETES MELLITUS WITH STAGE 2 CHRONIC KIDNEY DISEASE, WITHOUT LONG-TERM CURRENT USE OF INSULIN (H): ICD-10-CM

## 2021-08-24 DIAGNOSIS — N18.2 TYPE 2 DIABETES MELLITUS WITH STAGE 2 CHRONIC KIDNEY DISEASE, WITHOUT LONG-TERM CURRENT USE OF INSULIN (H): ICD-10-CM

## 2021-08-26 RX ORDER — GLIPIZIDE 10 MG/1
TABLET, FILM COATED, EXTENDED RELEASE ORAL
Qty: 30 TABLET | Refills: 0 | Status: SHIPPED | OUTPATIENT
Start: 2021-08-26 | End: 2021-09-09

## 2021-08-27 DIAGNOSIS — E11.22 TYPE 2 DIABETES MELLITUS WITH STAGE 2 CHRONIC KIDNEY DISEASE, WITHOUT LONG-TERM CURRENT USE OF INSULIN (H): ICD-10-CM

## 2021-08-27 DIAGNOSIS — N18.2 TYPE 2 DIABETES MELLITUS WITH STAGE 2 CHRONIC KIDNEY DISEASE, WITHOUT LONG-TERM CURRENT USE OF INSULIN (H): ICD-10-CM

## 2021-09-09 ENCOUNTER — OFFICE VISIT (OUTPATIENT)
Dept: FAMILY MEDICINE | Facility: CLINIC | Age: 51
End: 2021-09-09
Payer: COMMERCIAL

## 2021-09-09 VITALS
HEART RATE: 71 BPM | BODY MASS INDEX: 37.34 KG/M2 | SYSTOLIC BLOOD PRESSURE: 117 MMHG | RESPIRATION RATE: 20 BRPM | OXYGEN SATURATION: 96 % | DIASTOLIC BLOOD PRESSURE: 85 MMHG | TEMPERATURE: 97.8 F | WEIGHT: 264 LBS

## 2021-09-09 DIAGNOSIS — I10 ESSENTIAL HYPERTENSION WITH GOAL BLOOD PRESSURE LESS THAN 140/90: ICD-10-CM

## 2021-09-09 DIAGNOSIS — I10 BENIGN ESSENTIAL HYPERTENSION: ICD-10-CM

## 2021-09-09 DIAGNOSIS — E78.5 HYPERLIPIDEMIA LDL GOAL <100: ICD-10-CM

## 2021-09-09 DIAGNOSIS — Z12.11 SCREEN FOR COLON CANCER: ICD-10-CM

## 2021-09-09 DIAGNOSIS — E11.22 TYPE 2 DIABETES MELLITUS WITH STAGE 2 CHRONIC KIDNEY DISEASE, WITHOUT LONG-TERM CURRENT USE OF INSULIN (H): Primary | ICD-10-CM

## 2021-09-09 DIAGNOSIS — N18.2 TYPE 2 DIABETES MELLITUS WITH STAGE 2 CHRONIC KIDNEY DISEASE, WITHOUT LONG-TERM CURRENT USE OF INSULIN (H): Primary | ICD-10-CM

## 2021-09-09 PROBLEM — B18.2 HEPATITIS C, CHRONIC (H): Status: RESOLVED | Noted: 2021-09-09 | Resolved: 2021-09-09

## 2021-09-09 PROBLEM — B18.2 HEPATITIS C, CHRONIC (H): Status: ACTIVE | Noted: 2021-09-09

## 2021-09-09 LAB
CREAT UR-MCNC: 146 MG/DL
HBA1C MFR BLD: 7.9 % (ref 0–5.6)
HOLD SPECIMEN: NORMAL
HOLD SPECIMEN: NORMAL
MICROALBUMIN UR-MCNC: 6 MG/L
MICROALBUMIN/CREAT UR: 4.11 MG/G CR (ref 0–17)

## 2021-09-09 PROCEDURE — 36415 COLL VENOUS BLD VENIPUNCTURE: CPT | Performed by: PHYSICIAN ASSISTANT

## 2021-09-09 PROCEDURE — 83036 HEMOGLOBIN GLYCOSYLATED A1C: CPT | Performed by: PHYSICIAN ASSISTANT

## 2021-09-09 PROCEDURE — 99214 OFFICE O/P EST MOD 30 MIN: CPT | Performed by: PHYSICIAN ASSISTANT

## 2021-09-09 PROCEDURE — 82043 UR ALBUMIN QUANTITATIVE: CPT | Performed by: PHYSICIAN ASSISTANT

## 2021-09-09 RX ORDER — ATORVASTATIN CALCIUM 20 MG/1
20 TABLET, FILM COATED ORAL DAILY
Qty: 90 TABLET | Refills: 1 | Status: SHIPPED | OUTPATIENT
Start: 2021-09-09 | End: 2022-02-09

## 2021-09-09 RX ORDER — LISINOPRIL 10 MG/1
TABLET ORAL
Qty: 90 TABLET | Refills: 1 | Status: SHIPPED | OUTPATIENT
Start: 2021-09-09 | End: 2022-06-13

## 2021-09-09 RX ORDER — GLIPIZIDE 10 MG/1
TABLET, FILM COATED, EXTENDED RELEASE ORAL
Qty: 180 TABLET | Refills: 0 | Status: SHIPPED | OUTPATIENT
Start: 2021-09-09 | End: 2021-12-05

## 2021-09-09 NOTE — PROGRESS NOTES
Jazz Miller is a 51 year old who presents for the following health issues     HPI     Diabetes Follow-up    How often are you checking your blood sugar? One - two times daily  What time of day are you checking your blood sugars (select all that apply)?  every morning, sometimes in the afternoon  Have you had any blood sugars above 200?  No  Have you had any blood sugars below 70?  No    What symptoms do you notice when your blood sugar is low?  None and Not applicable    What concerns do you have today about your diabetes? None     Do you have any of these symptoms? (Select all that apply)  No numbness or tingling in feet.  No redness, sores or blisters on feet.  No complaints of excessive thirst.  No reports of blurry vision.  No significant changes to weight.    Have you had a diabetic eye exam in the last 12 months? No    Watches his diet pretty closely. Some time constraints and laziness influence poor food choices.     No chest pain/sob/palpitations/dizziness/ha's  No neuropathy changes or vision changes   BP Readings from Last 2 Encounters:   09/09/21 117/85   04/14/21 122/82     Hemoglobin A1C (%)   Date Value   09/09/2021 7.9 (H)   04/14/2021 7.5 (H)   04/08/2021 7.9 (A)     LDL Cholesterol Calculated (mg/dL)   Date Value   01/04/2021 71   07/28/2020 95           How many servings of fruits and vegetables do you eat daily?  0-1    On average, how many sweetened beverages do you drink each day (Examples: soda, juice, sweet tea, etc.  Do NOT count diet or artificially sweetened beverages)?   0    How many days per week do you exercise enough to make your heart beat faster? 3 or less    How many minutes a day do you exercise enough to make your heart beat faster? 30 - 60    How many days per week do you miss taking your medication? 0        Review of Systems   Constitutional, HEENT, cardiovascular, pulmonary, GI, , musculoskeletal, neuro, skin, endocrine and psych systems are negative, except as  otherwise noted.      Objective    /85   Pulse 71   Temp 97.8  F (36.6  C) (Tympanic)   Resp 20   Wt 119.7 kg (264 lb)   SpO2 96%   BMI 37.34 kg/m    Body mass index is 37.34 kg/m .  Physical Exam     Eye exam - right eye normal lid, conjunctiva, cornea, pupil and fundus, left eye normal lid, conjunctiva, cornea, pupil and fundus.  Thyroid not palpable, not enlarged, no nodules detected.  CHEST:chest clear to IPPA, no tachypnea, retractions or cyanosis and S1, S2 normal, no murmur, no gallop, rate regular.  Foot exam - both sides normal; no swelling, tenderness or skin or vascular lesions. Color and temperature is normal. Sensation is intact. Peripheral pulses are palpable. Toenails are normal.    Paul was seen today for diabetes.    Diagnoses and all orders for this visit:    Type 2 diabetes mellitus with stage 2 chronic kidney disease, without long-term current use of insulin (H)  -     Albumin Random Urine Quantitative with Creat Ratio; Future  -     OPTOMETRY REFERRAL; Future  -     Hemoglobin A1c; Future  -     Hemoglobin A1c  -     Albumin Random Urine Quantitative with Creat Ratio  -     dulaglutide (TRULICITY) 1.5 MG/0.5ML pen; Inject 1.5 mg Subcutaneous every 7 days  -     insulin glargine (LANTUS SOLOSTAR) 100 UNIT/ML pen; Inject 35 Units Subcutaneous 2 times daily  -     metFORMIN (GLUCOPHAGE) 1000 MG tablet; TAKE 1 TABLET BY MOUTH TWICE A DAY WITH MEALS  -     glipiZIDE (GLUCOTROL XL) 10 MG 24 hr tablet; Take 2 tab every morning.    Screen for colon cancer  -     [employer])    Essential hypertension with goal blood pressure less than 140/90    Hyperlipidemia LDL goal <100  -     atorvastatin (LIPITOR) 20 MG tablet; Take 1 tablet (20 mg) by mouth daily    Benign essential hypertension  -     lisinopril (ZESTRIL) 10 MG tablet; TAKE 1 TABLET BY MOUTH EVERY DAY    Other orders  -     REVIEW OF HEALTH MAINTENANCE PROTOCOL ORDERS  -     Extra Tube; Future  -     Extra  Tube    increase lantus by 10 units daily (now split dosing)  work on lifestyle modification  Recheck in 3 mos.

## 2021-09-25 DIAGNOSIS — E11.22 TYPE 2 DIABETES MELLITUS WITH STAGE 2 CHRONIC KIDNEY DISEASE, WITHOUT LONG-TERM CURRENT USE OF INSULIN (H): ICD-10-CM

## 2021-09-25 DIAGNOSIS — N18.2 TYPE 2 DIABETES MELLITUS WITH STAGE 2 CHRONIC KIDNEY DISEASE, WITHOUT LONG-TERM CURRENT USE OF INSULIN (H): ICD-10-CM

## 2021-09-26 ENCOUNTER — HEALTH MAINTENANCE LETTER (OUTPATIENT)
Age: 51
End: 2021-09-26

## 2021-09-26 LAB — COLOGUARD-ABSTRACT: NEGATIVE

## 2021-09-27 RX ORDER — GLIPIZIDE 10 MG/1
TABLET, FILM COATED, EXTENDED RELEASE ORAL
Qty: 30 TABLET | OUTPATIENT
Start: 2021-09-27

## 2021-10-19 DIAGNOSIS — E11.22 TYPE 2 DIABETES MELLITUS WITH STAGE 2 CHRONIC KIDNEY DISEASE, WITHOUT LONG-TERM CURRENT USE OF INSULIN (H): ICD-10-CM

## 2021-10-19 DIAGNOSIS — N18.2 TYPE 2 DIABETES MELLITUS WITH STAGE 2 CHRONIC KIDNEY DISEASE, WITHOUT LONG-TERM CURRENT USE OF INSULIN (H): ICD-10-CM

## 2021-10-22 RX ORDER — INSULIN GLARGINE 100 [IU]/ML
INJECTION, SOLUTION SUBCUTANEOUS
Qty: 21 ML | Refills: 1 | Status: SHIPPED | OUTPATIENT
Start: 2021-10-22 | End: 2022-01-11

## 2021-12-03 DIAGNOSIS — E11.22 TYPE 2 DIABETES MELLITUS WITH STAGE 2 CHRONIC KIDNEY DISEASE, WITHOUT LONG-TERM CURRENT USE OF INSULIN (H): ICD-10-CM

## 2021-12-03 DIAGNOSIS — N18.2 TYPE 2 DIABETES MELLITUS WITH STAGE 2 CHRONIC KIDNEY DISEASE, WITHOUT LONG-TERM CURRENT USE OF INSULIN (H): ICD-10-CM

## 2021-12-05 RX ORDER — GLIPIZIDE 10 MG/1
TABLET, FILM COATED, EXTENDED RELEASE ORAL
Qty: 180 TABLET | Refills: 0 | Status: SHIPPED | OUTPATIENT
Start: 2021-12-05 | End: 2022-02-09

## 2022-01-11 DIAGNOSIS — N18.2 TYPE 2 DIABETES MELLITUS WITH STAGE 2 CHRONIC KIDNEY DISEASE, WITHOUT LONG-TERM CURRENT USE OF INSULIN (H): ICD-10-CM

## 2022-01-11 DIAGNOSIS — E11.22 TYPE 2 DIABETES MELLITUS WITH STAGE 2 CHRONIC KIDNEY DISEASE, WITHOUT LONG-TERM CURRENT USE OF INSULIN (H): ICD-10-CM

## 2022-01-13 DIAGNOSIS — E11.22 TYPE 2 DIABETES MELLITUS WITH STAGE 2 CHRONIC KIDNEY DISEASE, WITHOUT LONG-TERM CURRENT USE OF INSULIN (H): ICD-10-CM

## 2022-01-13 DIAGNOSIS — N18.2 TYPE 2 DIABETES MELLITUS WITH STAGE 2 CHRONIC KIDNEY DISEASE, WITHOUT LONG-TERM CURRENT USE OF INSULIN (H): ICD-10-CM

## 2022-01-14 RX ORDER — INSULIN GLARGINE 100 [IU]/ML
INJECTION, SOLUTION SUBCUTANEOUS
Qty: 15 ML | Refills: 1 | OUTPATIENT
Start: 2022-01-14

## 2022-01-14 RX ORDER — FLURBIPROFEN SODIUM 0.3 MG/ML
SOLUTION/ DROPS OPHTHALMIC
Qty: 200 EACH | Refills: 1 | Status: SHIPPED | OUTPATIENT
Start: 2022-01-14 | End: 2022-08-17

## 2022-02-09 ENCOUNTER — OFFICE VISIT (OUTPATIENT)
Dept: FAMILY MEDICINE | Facility: CLINIC | Age: 52
End: 2022-02-09
Payer: COMMERCIAL

## 2022-02-09 ENCOUNTER — ANCILLARY PROCEDURE (OUTPATIENT)
Dept: GENERAL RADIOLOGY | Facility: CLINIC | Age: 52
End: 2022-02-09
Attending: PHYSICIAN ASSISTANT
Payer: COMMERCIAL

## 2022-02-09 VITALS
RESPIRATION RATE: 20 BRPM | OXYGEN SATURATION: 97 % | BODY MASS INDEX: 36.82 KG/M2 | WEIGHT: 263 LBS | SYSTOLIC BLOOD PRESSURE: 122 MMHG | DIASTOLIC BLOOD PRESSURE: 86 MMHG | TEMPERATURE: 97.7 F | HEIGHT: 71 IN | HEART RATE: 75 BPM

## 2022-02-09 DIAGNOSIS — N18.2 TYPE 2 DIABETES MELLITUS WITH STAGE 2 CHRONIC KIDNEY DISEASE, WITHOUT LONG-TERM CURRENT USE OF INSULIN (H): Primary | ICD-10-CM

## 2022-02-09 DIAGNOSIS — N18.2 TYPE 2 DIABETES MELLITUS WITH STAGE 2 CHRONIC KIDNEY DISEASE, WITHOUT LONG-TERM CURRENT USE OF INSULIN (H): ICD-10-CM

## 2022-02-09 DIAGNOSIS — R05.9 COUGH: ICD-10-CM

## 2022-02-09 DIAGNOSIS — E78.5 HYPERLIPIDEMIA LDL GOAL <100: ICD-10-CM

## 2022-02-09 DIAGNOSIS — E11.22 TYPE 2 DIABETES MELLITUS WITH STAGE 2 CHRONIC KIDNEY DISEASE, WITHOUT LONG-TERM CURRENT USE OF INSULIN (H): Primary | ICD-10-CM

## 2022-02-09 DIAGNOSIS — E11.22 TYPE 2 DIABETES MELLITUS WITH STAGE 2 CHRONIC KIDNEY DISEASE, WITHOUT LONG-TERM CURRENT USE OF INSULIN (H): ICD-10-CM

## 2022-02-09 DIAGNOSIS — I10 BENIGN ESSENTIAL HYPERTENSION: ICD-10-CM

## 2022-02-09 LAB
CHOLEST SERPL-MCNC: 127 MG/DL
FASTING STATUS PATIENT QL REPORTED: YES
HBA1C MFR BLD: 8.1 % (ref 0–5.6)
HDLC SERPL-MCNC: 43 MG/DL
HOLD SPECIMEN: NORMAL
HOLD SPECIMEN: NORMAL
LDLC SERPL CALC-MCNC: 61 MG/DL
NONHDLC SERPL-MCNC: 84 MG/DL
TRIGL SERPL-MCNC: 114 MG/DL

## 2022-02-09 PROCEDURE — 71046 X-RAY EXAM CHEST 2 VIEWS: CPT | Performed by: RADIOLOGY

## 2022-02-09 PROCEDURE — 36415 COLL VENOUS BLD VENIPUNCTURE: CPT | Performed by: PHYSICIAN ASSISTANT

## 2022-02-09 PROCEDURE — 99214 OFFICE O/P EST MOD 30 MIN: CPT | Performed by: PHYSICIAN ASSISTANT

## 2022-02-09 PROCEDURE — 99207 PR FOOT EXAM NO CHARGE: CPT | Performed by: PHYSICIAN ASSISTANT

## 2022-02-09 PROCEDURE — 83036 HEMOGLOBIN GLYCOSYLATED A1C: CPT | Performed by: PHYSICIAN ASSISTANT

## 2022-02-09 PROCEDURE — 80061 LIPID PANEL: CPT | Performed by: PHYSICIAN ASSISTANT

## 2022-02-09 RX ORDER — FLASH GLUCOSE SENSOR
KIT MISCELLANEOUS
Qty: 6 EACH | Refills: 3 | Status: SHIPPED | OUTPATIENT
Start: 2022-02-09 | End: 2022-06-02

## 2022-02-09 RX ORDER — GLIPIZIDE 10 MG/1
TABLET, FILM COATED, EXTENDED RELEASE ORAL
Qty: 180 TABLET | Refills: 0 | Status: SHIPPED | OUTPATIENT
Start: 2022-02-09 | End: 2022-05-09

## 2022-02-09 RX ORDER — INSULIN GLARGINE 100 [IU]/ML
INJECTION, SOLUTION SUBCUTANEOUS
Qty: 15 ML | Refills: 2 | Status: SHIPPED | OUTPATIENT
Start: 2022-02-09 | End: 2022-04-11

## 2022-02-09 RX ORDER — LOSARTAN POTASSIUM 50 MG/1
50 TABLET ORAL DAILY
Qty: 90 TABLET | Refills: 1 | Status: SHIPPED | OUTPATIENT
Start: 2022-02-09 | End: 2022-08-10

## 2022-02-09 RX ORDER — ATORVASTATIN CALCIUM 20 MG/1
20 TABLET, FILM COATED ORAL DAILY
Qty: 90 TABLET | Refills: 1 | Status: SHIPPED | OUTPATIENT
Start: 2022-02-09 | End: 2022-09-15

## 2022-02-09 RX ORDER — FLASH GLUCOSE SENSOR
1 KIT MISCELLANEOUS DAILY
Qty: 1 EACH | Refills: 0 | Status: SHIPPED | OUTPATIENT
Start: 2022-02-09 | End: 2022-06-02

## 2022-02-09 ASSESSMENT — MIFFLIN-ST. JEOR: SCORE: 2057.15

## 2022-02-09 ASSESSMENT — PAIN SCALES - GENERAL: PAINLEVEL: NO PAIN (0)

## 2022-02-09 NOTE — PROGRESS NOTES
Jazz Miller is a 51 year old who presents for the following health issues     HPI     Diabetes Follow-up    How often are you checking your blood sugar? A few times a week  What time of day are you checking your blood sugars (select all that apply)?  in the morning and mid afternoon  Have you had any blood sugars above 200?  Yes   Have you had any blood sugars below 70?  No    What symptoms do you notice when your blood sugar is low?  None and Not applicable    What concerns do you have today about your diabetes? Blood sugars have been all over the place since starting Lantus twice a day     Do you have any of these symptoms? (Select all that apply)  No numbness or tingling in feet.  No redness, sores or blisters on feet.  No complaints of excessive thirst.  No reports of blurry vision.  No significant changes to weight.    Have you had a diabetic eye exam in the last 12 months? No        BP Readings from Last 2 Encounters:   02/09/22 122/86   09/09/21 117/85     Hemoglobin A1C POCT (%)   Date Value   04/14/2021 7.5 (H)   04/08/2021 7.9 (A)     Hemoglobin A1C (%)   Date Value   02/09/2022 8.1 (H)   09/09/2021 7.9 (H)     LDL Cholesterol Calculated (mg/dL)   Date Value   01/04/2021 71   07/28/2020 95       Numbers running higher lately.  Has become slightly more active.  No other changes in diet, etc.   Eats a very light bf and supper and a larger lunch.    No chest pain/sob/palpitations/dizziness/ha's        How many servings of fruits and vegetables do you eat daily?  2-3    On average, how many sweetened beverages do you drink each day (Examples: soda, juice, sweet tea, etc.  Do NOT count diet or artificially sweetened beverages)?   0    How many days per week do you exercise enough to make your heart beat faster? 3 or less    How many minutes a day do you exercise enough to make your heart beat faster? 9 or less    How many days per week do you miss taking your medication? 0    Cough x 3 weeks to a  "mos  No gerd symptoms.   No fevers. No profound congestion. Will stop lisinopril.  Seems to be improving. No profound wheezing.     Review of Systems   Constitutional, HEENT, cardiovascular, pulmonary, GI, , musculoskeletal, neuro, skin, endocrine and psych systems are negative, except as otherwise noted.      Objective    /86   Pulse 75   Temp 97.7  F (36.5  C) (Tympanic)   Resp 20   Ht 1.791 m (5' 10.5\")   Wt 119.3 kg (263 lb)   SpO2 97%   BMI 37.20 kg/m    Body mass index is 37.2 kg/m .  Physical Exam   Eye exam - right eye normal lid, conjunctiva, cornea, pupil and fundus, left eye normal lid, conjunctiva, cornea, pupil and fundus.  Thyroid not palpable, not enlarged, no nodules detected.  CHEST:chest clear to IPPA, no tachypnea, retractions or cyanosis and S1, S2 normal, no murmur, no gallop, rate regular.  Foot exam - both sides normal; no swelling, tenderness or skin or vascular lesions. Color and temperature is normal. Sensation is intact. Peripheral pulses are palpable. Toenails are normal.    Paul was seen today for diabetes and cough.    Diagnoses and all orders for this visit:    Type 2 diabetes mellitus with stage 2 chronic kidney disease, without long-term current use of insulin (H)  -     Hemoglobin A1c; Future  -     FOOT EXAM  -     Hemoglobin A1c  -     insulin glargine (SEMGLEE) 100 UNIT/ML pen; Administer 45 units in the morning and at bedtime.  -     Continuous Blood Gluc  (FREESTYLE GEREIMAS READER) AMY; 1 Device daily  -     continuous blood glucose monitoring (FREESTYLE GEREMIAS) sensor; For use with Freestyle Geremias Flash  for continuous monitioring of blood glucose levels. Replace sensor every 10 days.  -     glipiZIDE (GLUCOTROL XL) 10 MG 24 hr tablet; TAKE 2 TABLET BY MOUTH EVERY MORNING.  -     dulaglutide (TRULICITY) 1.5 MG/0.5ML pen; Inject 1.5 mg Subcutaneous every 7 days  -     metFORMIN (GLUCOPHAGE) 1000 MG tablet; TAKE 1 TABLET BY MOUTH TWICE A DAY WITH " MEALS  -     AMB Adult Diabetes Educator Referral; Future    Benign essential hypertension  -     losartan (COZAAR) 50 MG tablet; Take 1 tablet (50 mg) by mouth daily    Hyperlipidemia LDL goal <100  -     atorvastatin (LIPITOR) 20 MG tablet; Take 1 tablet (20 mg) by mouth daily    Cough  -     XR Chest 2 Views; Future    Other orders  -     Lipid panel reflex to direct LDL Fasting; Future  -     Extra Tube; Future  -     Extra Tube  -     Lipid panel reflex to direct LDL Fasting      Inc insulin glargine to 45 unit bid.  work on lifestyle modification  Stop lisinopril and start losartan.   Recheck in 3-4 mos .

## 2022-04-07 DIAGNOSIS — N18.2 TYPE 2 DIABETES MELLITUS WITH STAGE 2 CHRONIC KIDNEY DISEASE, WITHOUT LONG-TERM CURRENT USE OF INSULIN (H): ICD-10-CM

## 2022-04-07 DIAGNOSIS — E11.22 TYPE 2 DIABETES MELLITUS WITH STAGE 2 CHRONIC KIDNEY DISEASE, WITHOUT LONG-TERM CURRENT USE OF INSULIN (H): ICD-10-CM

## 2022-04-11 ENCOUNTER — MYC MEDICAL ADVICE (OUTPATIENT)
Dept: FAMILY MEDICINE | Facility: CLINIC | Age: 52
End: 2022-04-11
Payer: COMMERCIAL

## 2022-04-11 RX ORDER — INSULIN GLARGINE-YFGN 100 [IU]/ML
INJECTION, SOLUTION SUBCUTANEOUS
Qty: 15 ML | Refills: 1 | Status: SHIPPED | OUTPATIENT
Start: 2022-04-11 | End: 2022-05-17

## 2022-05-07 DIAGNOSIS — N18.2 TYPE 2 DIABETES MELLITUS WITH STAGE 2 CHRONIC KIDNEY DISEASE, WITHOUT LONG-TERM CURRENT USE OF INSULIN (H): ICD-10-CM

## 2022-05-07 DIAGNOSIS — E11.22 TYPE 2 DIABETES MELLITUS WITH STAGE 2 CHRONIC KIDNEY DISEASE, WITHOUT LONG-TERM CURRENT USE OF INSULIN (H): ICD-10-CM

## 2022-05-08 ENCOUNTER — HEALTH MAINTENANCE LETTER (OUTPATIENT)
Age: 52
End: 2022-05-08

## 2022-05-09 RX ORDER — GLIPIZIDE 10 MG/1
TABLET, FILM COATED, EXTENDED RELEASE ORAL
Qty: 180 TABLET | Refills: 0 | Status: SHIPPED | OUTPATIENT
Start: 2022-05-09 | End: 2022-06-09

## 2022-05-09 NOTE — TELEPHONE ENCOUNTER
Medication is being filled for 1 time refill only due to:  Patient needs to be seen because needs recheck in June.

## 2022-05-17 ENCOUNTER — MYC MEDICAL ADVICE (OUTPATIENT)
Dept: FAMILY MEDICINE | Facility: CLINIC | Age: 52
End: 2022-05-17
Payer: COMMERCIAL

## 2022-05-17 DIAGNOSIS — E11.22 TYPE 2 DIABETES MELLITUS WITH STAGE 2 CHRONIC KIDNEY DISEASE, WITHOUT LONG-TERM CURRENT USE OF INSULIN (H): ICD-10-CM

## 2022-05-17 DIAGNOSIS — N18.2 TYPE 2 DIABETES MELLITUS WITH STAGE 2 CHRONIC KIDNEY DISEASE, WITHOUT LONG-TERM CURRENT USE OF INSULIN (H): ICD-10-CM

## 2022-05-17 RX ORDER — INSULIN GLARGINE-YFGN 100 [IU]/ML
45 INJECTION, SOLUTION SUBCUTANEOUS 2 TIMES DAILY
Qty: 15 ML | Refills: 0 | Status: SHIPPED | OUTPATIENT
Start: 2022-05-17 | End: 2022-05-31

## 2022-05-28 DIAGNOSIS — E11.22 TYPE 2 DIABETES MELLITUS WITH STAGE 2 CHRONIC KIDNEY DISEASE, WITHOUT LONG-TERM CURRENT USE OF INSULIN (H): ICD-10-CM

## 2022-05-28 DIAGNOSIS — N18.2 TYPE 2 DIABETES MELLITUS WITH STAGE 2 CHRONIC KIDNEY DISEASE, WITHOUT LONG-TERM CURRENT USE OF INSULIN (H): ICD-10-CM

## 2022-05-31 RX ORDER — INSULIN GLARGINE-YFGN 100 [IU]/ML
INJECTION, SOLUTION SUBCUTANEOUS
Qty: 15 ML | Refills: 0 | Status: SHIPPED | OUTPATIENT
Start: 2022-05-31 | End: 2022-06-09

## 2022-05-31 NOTE — PROGRESS NOTES
"      Jazz Miller is a 52 year old who presents for the following health issues     HPI     Diabetes Follow-up    How often are you checking your blood sugar? A few times a week    What time of day are you checking your blood sugars (select all that apply)?  in the morning and mid afternoon    Have you had any blood sugars above 200?  Yes     Have you had any blood sugars below 70?  No    What symptoms do you notice when your blood sugar is low?  None and Not applicable    What concerns do you have today about your diabetes? Blood sugars have been all over the place      Do you have any of these symptoms? (Select all that apply)  No numbness or tingling in feet.  No redness, sores or blisters on feet.  No complaints of excessive thirst.  No reports of blurry vision.  No significant changes to weight.    Have you had a diabetic eye exam in the last 12 months? No    Some episodic hypoglycemic spells at noc. Otherwise sugars are all over the place. Diet stable. Trying to be a little more active.   No chest pain/sob/palpitations/dizziness/ha's  No neuropathy symptoms.  A lot of stress at work.   BP Readings from Last 2 Encounters:   06/09/22 136/83   02/09/22 122/86     Hemoglobin A1C POCT (%)   Date Value   04/14/2021 7.5 (H)   04/08/2021 7.9 (A)     Hemoglobin A1C (%)   Date Value   02/09/2022 8.1 (H)   09/09/2021 7.9 (H)     LDL Cholesterol Calculated (mg/dL)   Date Value   02/09/2022 61   01/04/2021 71   07/28/2020 95         Review of Systems   Constitutional, HEENT, cardiovascular, pulmonary, GI, , musculoskeletal, neuro, skin, endocrine and psych systems are negative, except as otherwise noted.      Objective    /83   Pulse 62   Temp 97.3  F (36.3  C) (Tympanic)   Resp 20   Ht 1.791 m (5' 10.5\")   Wt 121.3 kg (267 lb 6.4 oz)   SpO2 95%   BMI 37.83 kg/m    Body mass index is 37.83 kg/m .  Physical Exam   Eye exam - right eye normal lid, conjunctiva, cornea, pupil and fundus, left eye normal " lid, conjunctiva, cornea, pupil and fundus.  Thyroid not palpable, not enlarged, no nodules detected.  CHEST:chest clear to IPPA, no tachypnea, retractions or cyanosis and S1, S2 normal, no murmur, no gallop, rate regular.  Foot exam - both sides normal; no swelling, tenderness or skin or vascular lesions. Color and temperature is normal. Sensation is intact. Peripheral pulses are palpable. Toenails are normal.    Paul was seen today for diabetes.    Diagnoses and all orders for this visit:    Type 2 diabetes mellitus with stage 2 chronic kidney disease, without long-term current use of insulin (H)  -     Hemoglobin A1c; Future  -     Comprehensive metabolic panel (BMP + Alb, Alk Phos, ALT, AST, Total. Bili, TP); Future  -     Comprehensive metabolic panel (BMP + Alb, Alk Phos, ALT, AST, Total. Bili, TP)  -     Hemoglobin A1c  -     Adult Endocrinology  Referral; Future  -     Insulin Glargine-yfgn (SEMGLEE, YFGN,) 100 UNIT/ML SOPN; Inject 70 Units Subcutaneous At Bedtime  -     insulin lispro (HUMALOG KWIKPEN) 100 UNIT/ML (1 unit dial) KWIKPEN; Inject 5 Units Subcutaneous 3 times daily (before meals)    Morbid obesity (H)    Benign essential hypertension  -     Comprehensive metabolic panel (BMP + Alb, Alk Phos, ALT, AST, Total. Bili, TP); Future  -     Comprehensive metabolic panel (BMP + Alb, Alk Phos, ALT, AST, Total. Bili, TP)      work on lifestyle modification  Discontinue glipizide.  Changed semglee dose to 70 units at bedtime (was 45 units bid).  Start humalog 5 units prior to each meal.

## 2022-06-02 ENCOUNTER — MYC MEDICAL ADVICE (OUTPATIENT)
Dept: FAMILY MEDICINE | Facility: CLINIC | Age: 52
End: 2022-06-02
Payer: COMMERCIAL

## 2022-06-02 DIAGNOSIS — N18.2 TYPE 2 DIABETES MELLITUS WITH STAGE 2 CHRONIC KIDNEY DISEASE, WITHOUT LONG-TERM CURRENT USE OF INSULIN (H): ICD-10-CM

## 2022-06-02 DIAGNOSIS — E11.22 TYPE 2 DIABETES MELLITUS WITH STAGE 2 CHRONIC KIDNEY DISEASE, WITHOUT LONG-TERM CURRENT USE OF INSULIN (H): ICD-10-CM

## 2022-06-02 RX ORDER — FLASH GLUCOSE SENSOR
KIT MISCELLANEOUS
Qty: 6 EACH | Refills: 3 | Status: SHIPPED | OUTPATIENT
Start: 2022-06-02 | End: 2022-06-09

## 2022-06-02 RX ORDER — FLASH GLUCOSE SENSOR
1 KIT MISCELLANEOUS DAILY
Qty: 1 EACH | Refills: 0 | Status: SHIPPED | OUTPATIENT
Start: 2022-06-02 | End: 2022-06-09

## 2022-06-02 NOTE — TELEPHONE ENCOUNTER
Per patient's insurance (My Chart message), the following items have been forwarded to: [facility] [place]  [place], [place] - [id number] [facility]    Continuous Blood Gluc  (FREESTYLE GEREMIAS READER) DEVICE    continuous blood glucose monitoring (FREESTYLE GEREMIAS) sensor    Megha Hyman RN BSN  [facility]

## 2022-06-09 ENCOUNTER — OFFICE VISIT (OUTPATIENT)
Dept: FAMILY MEDICINE | Facility: CLINIC | Age: 52
End: 2022-06-09
Payer: COMMERCIAL

## 2022-06-09 VITALS
BODY MASS INDEX: 37.44 KG/M2 | TEMPERATURE: 97.3 F | DIASTOLIC BLOOD PRESSURE: 83 MMHG | SYSTOLIC BLOOD PRESSURE: 136 MMHG | WEIGHT: 267.4 LBS | OXYGEN SATURATION: 95 % | RESPIRATION RATE: 20 BRPM | HEIGHT: 71 IN | HEART RATE: 62 BPM

## 2022-06-09 DIAGNOSIS — E66.01 MORBID OBESITY (H): ICD-10-CM

## 2022-06-09 DIAGNOSIS — E11.22 TYPE 2 DIABETES MELLITUS WITH STAGE 2 CHRONIC KIDNEY DISEASE, WITHOUT LONG-TERM CURRENT USE OF INSULIN (H): Primary | ICD-10-CM

## 2022-06-09 DIAGNOSIS — N18.2 TYPE 2 DIABETES MELLITUS WITH STAGE 2 CHRONIC KIDNEY DISEASE, WITHOUT LONG-TERM CURRENT USE OF INSULIN (H): Primary | ICD-10-CM

## 2022-06-09 DIAGNOSIS — E11.22 TYPE 2 DIABETES MELLITUS WITH STAGE 2 CHRONIC KIDNEY DISEASE, WITHOUT LONG-TERM CURRENT USE OF INSULIN (H): ICD-10-CM

## 2022-06-09 DIAGNOSIS — I10 BENIGN ESSENTIAL HYPERTENSION: ICD-10-CM

## 2022-06-09 DIAGNOSIS — N18.2 TYPE 2 DIABETES MELLITUS WITH STAGE 2 CHRONIC KIDNEY DISEASE, WITHOUT LONG-TERM CURRENT USE OF INSULIN (H): ICD-10-CM

## 2022-06-09 LAB
ALBUMIN SERPL-MCNC: 4.2 G/DL (ref 3.4–5)
ALP SERPL-CCNC: 67 U/L (ref 40–150)
ALT SERPL W P-5'-P-CCNC: 96 U/L (ref 0–70)
ANION GAP SERPL CALCULATED.3IONS-SCNC: 5 MMOL/L (ref 3–14)
AST SERPL W P-5'-P-CCNC: 66 U/L (ref 0–45)
BILIRUB SERPL-MCNC: 1 MG/DL (ref 0.2–1.3)
BUN SERPL-MCNC: 14 MG/DL (ref 7–30)
CALCIUM SERPL-MCNC: 9.6 MG/DL (ref 8.5–10.1)
CHLORIDE BLD-SCNC: 105 MMOL/L (ref 94–109)
CO2 SERPL-SCNC: 28 MMOL/L (ref 20–32)
CREAT SERPL-MCNC: 1.01 MG/DL (ref 0.66–1.25)
GFR SERPL CREATININE-BSD FRML MDRD: 89 ML/MIN/1.73M2
GLUCOSE BLD-MCNC: 178 MG/DL (ref 70–99)
HBA1C MFR BLD: 9.1 % (ref 0–5.6)
POTASSIUM BLD-SCNC: 5.6 MMOL/L (ref 3.4–5.3)
PROT SERPL-MCNC: 8.2 G/DL (ref 6.8–8.8)
SODIUM SERPL-SCNC: 138 MMOL/L (ref 133–144)

## 2022-06-09 PROCEDURE — 99214 OFFICE O/P EST MOD 30 MIN: CPT | Performed by: PHYSICIAN ASSISTANT

## 2022-06-09 PROCEDURE — 83036 HEMOGLOBIN GLYCOSYLATED A1C: CPT | Performed by: PHYSICIAN ASSISTANT

## 2022-06-09 PROCEDURE — 36415 COLL VENOUS BLD VENIPUNCTURE: CPT | Performed by: PHYSICIAN ASSISTANT

## 2022-06-09 PROCEDURE — 80053 COMPREHEN METABOLIC PANEL: CPT | Performed by: PHYSICIAN ASSISTANT

## 2022-06-09 RX ORDER — INSULIN LISPRO 100 [IU]/ML
5 INJECTION, SOLUTION INTRAVENOUS; SUBCUTANEOUS
Qty: 15 ML | Refills: 2 | Status: CANCELLED | OUTPATIENT
Start: 2022-06-09

## 2022-06-09 RX ORDER — FLASH GLUCOSE SENSOR
KIT MISCELLANEOUS
Qty: 6 EACH | Refills: 3 | Status: SHIPPED | OUTPATIENT
Start: 2022-06-09 | End: 2022-10-04

## 2022-06-09 RX ORDER — INSULIN LISPRO 100 [IU]/ML
5 INJECTION, SOLUTION INTRAVENOUS; SUBCUTANEOUS
Qty: 15 ML | Refills: 2 | Status: SHIPPED | OUTPATIENT
Start: 2022-06-09 | End: 2022-11-15

## 2022-06-09 RX ORDER — FLASH GLUCOSE SENSOR
1 KIT MISCELLANEOUS DAILY
Qty: 1 EACH | Refills: 0 | Status: SHIPPED | OUTPATIENT
Start: 2022-06-09 | End: 2023-06-08

## 2022-06-09 RX ORDER — INSULIN GLARGINE-YFGN 100 [IU]/ML
70 INJECTION, SOLUTION SUBCUTANEOUS AT BEDTIME
Qty: 15 ML | Refills: 2 | Status: SHIPPED | OUTPATIENT
Start: 2022-06-09 | End: 2022-09-01

## 2022-06-09 ASSESSMENT — PAIN SCALES - GENERAL: PAINLEVEL: NO PAIN (0)

## 2022-06-10 ENCOUNTER — TELEPHONE (OUTPATIENT)
Dept: FAMILY MEDICINE | Facility: CLINIC | Age: 52
End: 2022-06-10
Payer: COMMERCIAL

## 2022-06-10 DIAGNOSIS — N18.2 TYPE 2 DIABETES MELLITUS WITH STAGE 2 CHRONIC KIDNEY DISEASE, WITHOUT LONG-TERM CURRENT USE OF INSULIN (H): Primary | ICD-10-CM

## 2022-06-10 DIAGNOSIS — E11.22 TYPE 2 DIABETES MELLITUS WITH STAGE 2 CHRONIC KIDNEY DISEASE, WITHOUT LONG-TERM CURRENT USE OF INSULIN (H): Primary | ICD-10-CM

## 2022-06-10 NOTE — TELEPHONE ENCOUNTER
I called the pharmacy and spoke with Vashti (Pharmacist). She confirmed that insurance will not cover the Humalog which was sent yesterday.     Preferred product: Novolog    Routing refill request to provider for review/approval because:  Drug not active on patient's medication list  Needs provider approval    Megha Hyman RN BSN  [facility]

## 2022-06-13 DIAGNOSIS — E87.5 HYPERKALEMIA: Primary | ICD-10-CM

## 2022-08-15 DIAGNOSIS — E11.22 TYPE 2 DIABETES MELLITUS WITH STAGE 2 CHRONIC KIDNEY DISEASE, WITHOUT LONG-TERM CURRENT USE OF INSULIN (H): ICD-10-CM

## 2022-08-15 DIAGNOSIS — N18.2 TYPE 2 DIABETES MELLITUS WITH STAGE 2 CHRONIC KIDNEY DISEASE, WITHOUT LONG-TERM CURRENT USE OF INSULIN (H): ICD-10-CM

## 2022-08-17 ENCOUNTER — MYC MEDICAL ADVICE (OUTPATIENT)
Dept: FAMILY MEDICINE | Facility: CLINIC | Age: 52
End: 2022-08-17

## 2022-08-17 RX ORDER — FLURBIPROFEN SODIUM 0.3 MG/ML
SOLUTION/ DROPS OPHTHALMIC 4 TIMES DAILY
Qty: 400 EACH | Refills: 3 | Status: SHIPPED | OUTPATIENT
Start: 2022-08-17 | End: 2023-10-20

## 2022-08-17 NOTE — TELEPHONE ENCOUNTER
Prescription approved per [facility] Refill Protocol.    Megha Hyman RN BSN  [facility]

## 2022-09-21 ENCOUNTER — MYC REFILL (OUTPATIENT)
Dept: FAMILY MEDICINE | Facility: CLINIC | Age: 52
End: 2022-09-21

## 2022-09-21 DIAGNOSIS — N18.2 TYPE 2 DIABETES MELLITUS WITH STAGE 2 CHRONIC KIDNEY DISEASE, WITHOUT LONG-TERM CURRENT USE OF INSULIN (H): ICD-10-CM

## 2022-09-21 DIAGNOSIS — E11.22 TYPE 2 DIABETES MELLITUS WITH STAGE 2 CHRONIC KIDNEY DISEASE, WITHOUT LONG-TERM CURRENT USE OF INSULIN (H): ICD-10-CM

## 2022-09-21 RX ORDER — INSULIN GLARGINE-YFGN 100 [IU]/ML
INJECTION, SOLUTION SUBCUTANEOUS
Qty: 15 ML | Refills: 0 | Status: CANCELLED | OUTPATIENT
Start: 2022-09-21

## 2022-09-28 NOTE — PROGRESS NOTES
Jazz Miller is a 52 year old, presenting for the following health issues:  Diabetes (recheck) and Health Maintenance (Patient already had influenza vaccine.  Patient will check on insurance coverage for shingles vaccine. )      History of Present Illness       Diabetes:   He presents for follow up of diabetes.  He is checking home blood glucose two times daily. He checks blood glucose before meals and at bedtime.  Blood glucose is sometimes over 200 and sometimes under 70. He is aware of hypoglycemia symptoms including shakiness, dizziness and weakness. He is concerned about blood sugar frequently over 200.  He is not experiencing numbness or burning in feet, excessive thirst, blurry vision, weight changes or redness, sores or blisters on feet. The patient has not had a diabetic eye exam in the last 12 months.         He eats 0-1 servings of fruits and vegetables daily.He consumes 0 sweetened beverage(s) daily.He exercises with enough effort to increase his heart rate 9 or less minutes per day.  He exercises with enough effort to increase his heart rate 3 or less days per week.   He is taking medications regularly.     Sugars have been up and down.   No chest pain/sob/palpitations/dizziness/ha's  Some hypoglycemic episodes typically during the noc.   Dosing novolog at 8-10 units.     Review of Systems   Constitutional, HEENT, cardiovascular, pulmonary, GI, , musculoskeletal, neuro, skin, endocrine and psych systems are negative, except as otherwise noted.      Objective    /87   Pulse 75   Temp 98.1  F (36.7  C) (Tympanic)   Resp 20   Wt 119.7 kg (263 lb 12.8 oz)   SpO2 95%   BMI 37.32 kg/m    Body mass index is 37.32 kg/m .  Physical Exam     Eye exam - right eye normal lid, conjunctiva, cornea, pupil and fundus, left eye normal lid, conjunctiva, cornea, pupil and fundus.  Thyroid not palpable, not enlarged, no nodules detected.  CHEST:chest clear to IPPA, no tachypnea, retractions or  cyanosis and S1, S2 normal, no murmur, no gallop, rate regular.  Foot exam - both sides normal; no swelling, tenderness or skin or vascular lesions. Color and temperature is normal. Sensation is intact. Peripheral pulses are palpable. Toenails are normal.  Paul was seen today for diabetes and health maintenance.    Diagnoses and all orders for this visit:    Type 2 diabetes mellitus with stage 2 chronic kidney disease, without long-term current use of insulin (H)  -     Albumin Random Urine Quantitative with Creat Ratio; Future  -     dulaglutide (TRULICITY) 1.5 MG/0.5ML pen; Inject 1.5 mg Subcutaneous every 7 days  -     metFORMIN (GLUCOPHAGE) 1000 MG tablet; Take 1 tablet (1,000 mg) by mouth 2 times daily (with meals)  -     Basic metabolic panel  (Ca, Cl, CO2, Creat, Gluc, K, Na, BUN); Future  -     Hemoglobin A1c; Future  -     Hemoglobin A1c  -     Basic metabolic panel  (Ca, Cl, CO2, Creat, Gluc, K, Na, BUN)  -     Albumin Random Urine Quantitative with Creat Ratio  -     continuous blood glucose monitoring (FREESTYLE GEREMIAS) sensor; For use with Freestyle Geremias Flash  for continuous monitioring of blood glucose levels. Replace sensor every 10 days.  -     insulin aspart (NOVOLOG PEN) 100 UNIT/ML pen; Inject 12 units prior to breakfast, 16 units prior to lunch, 8 units prior to supper  -     insulin glargine (LANTUS VIAL) 100 UNIT/ML vial; Inject 50 units in the morning and 30 units qhs    Benign essential hypertension  -     losartan (COZAAR) 50 MG tablet; Take 1 tablet (50 mg) by mouth daily  -     Basic metabolic panel  (Ca, Cl, CO2, Creat, Gluc, K, Na, BUN); Future  -     Basic metabolic panel  (Ca, Cl, CO2, Creat, Gluc, K, Na, BUN)    Hyperlipidemia LDL goal <100  -     atorvastatin (LIPITOR) 20 MG tablet; Take 1 tablet (20 mg) by mouth daily  -     Lipid panel reflex to direct LDL Fasting; Future  -     Lipid panel reflex to direct LDL Fasting    Other orders  -     TDAP VACCINE (Adacel,  Boostrix)  -     REVIEW OF HEALTH MAINTENANCE PROTOCOL ORDERS      Adjusted insulin. Recheck in 3 mos .  work on lifestyle modification

## 2022-10-04 ENCOUNTER — OFFICE VISIT (OUTPATIENT)
Dept: FAMILY MEDICINE | Facility: CLINIC | Age: 52
End: 2022-10-04
Payer: COMMERCIAL

## 2022-10-04 VITALS
TEMPERATURE: 98.1 F | RESPIRATION RATE: 20 BRPM | HEART RATE: 75 BPM | OXYGEN SATURATION: 95 % | SYSTOLIC BLOOD PRESSURE: 124 MMHG | WEIGHT: 263.8 LBS | BODY MASS INDEX: 37.32 KG/M2 | DIASTOLIC BLOOD PRESSURE: 87 MMHG

## 2022-10-04 DIAGNOSIS — N18.2 TYPE 2 DIABETES MELLITUS WITH STAGE 2 CHRONIC KIDNEY DISEASE, WITHOUT LONG-TERM CURRENT USE OF INSULIN (H): ICD-10-CM

## 2022-10-04 DIAGNOSIS — I10 BENIGN ESSENTIAL HYPERTENSION: ICD-10-CM

## 2022-10-04 DIAGNOSIS — E78.5 HYPERLIPIDEMIA LDL GOAL <100: ICD-10-CM

## 2022-10-04 DIAGNOSIS — E11.22 TYPE 2 DIABETES MELLITUS WITH STAGE 2 CHRONIC KIDNEY DISEASE, WITHOUT LONG-TERM CURRENT USE OF INSULIN (H): ICD-10-CM

## 2022-10-04 LAB
ANION GAP SERPL CALCULATED.3IONS-SCNC: 8 MMOL/L (ref 3–14)
BUN SERPL-MCNC: 16 MG/DL (ref 7–30)
CALCIUM SERPL-MCNC: 9.5 MG/DL (ref 8.5–10.1)
CHLORIDE BLD-SCNC: 102 MMOL/L (ref 94–109)
CHOLEST SERPL-MCNC: 133 MG/DL
CO2 SERPL-SCNC: 25 MMOL/L (ref 20–32)
CREAT SERPL-MCNC: 1.02 MG/DL (ref 0.66–1.25)
CREAT UR-MCNC: 210 MG/DL
FASTING STATUS PATIENT QL REPORTED: YES
GFR SERPL CREATININE-BSD FRML MDRD: 88 ML/MIN/1.73M2
GLUCOSE BLD-MCNC: 267 MG/DL (ref 70–99)
HBA1C MFR BLD: 10.3 % (ref 0–5.6)
HDLC SERPL-MCNC: 38 MG/DL
LDLC SERPL CALC-MCNC: 60 MG/DL
MICROALBUMIN UR-MCNC: 27 MG/L
MICROALBUMIN/CREAT UR: 12.86 MG/G CR (ref 0–17)
NONHDLC SERPL-MCNC: 95 MG/DL
POTASSIUM BLD-SCNC: 4.6 MMOL/L (ref 3.4–5.3)
SODIUM SERPL-SCNC: 135 MMOL/L (ref 133–144)
TRIGL SERPL-MCNC: 173 MG/DL

## 2022-10-04 PROCEDURE — 83036 HEMOGLOBIN GLYCOSYLATED A1C: CPT | Performed by: PHYSICIAN ASSISTANT

## 2022-10-04 PROCEDURE — 99214 OFFICE O/P EST MOD 30 MIN: CPT | Mod: 25 | Performed by: PHYSICIAN ASSISTANT

## 2022-10-04 PROCEDURE — 82043 UR ALBUMIN QUANTITATIVE: CPT | Performed by: PHYSICIAN ASSISTANT

## 2022-10-04 PROCEDURE — 80048 BASIC METABOLIC PNL TOTAL CA: CPT | Performed by: PHYSICIAN ASSISTANT

## 2022-10-04 PROCEDURE — 80061 LIPID PANEL: CPT | Performed by: PHYSICIAN ASSISTANT

## 2022-10-04 PROCEDURE — 36415 COLL VENOUS BLD VENIPUNCTURE: CPT | Performed by: PHYSICIAN ASSISTANT

## 2022-10-04 PROCEDURE — 90715 TDAP VACCINE 7 YRS/> IM: CPT | Performed by: PHYSICIAN ASSISTANT

## 2022-10-04 PROCEDURE — 90471 IMMUNIZATION ADMIN: CPT | Performed by: PHYSICIAN ASSISTANT

## 2022-10-04 RX ORDER — ATORVASTATIN CALCIUM 20 MG/1
20 TABLET, FILM COATED ORAL DAILY
Qty: 90 TABLET | Refills: 3 | Status: SHIPPED | OUTPATIENT
Start: 2022-10-04 | End: 2023-08-08

## 2022-10-04 RX ORDER — LOSARTAN POTASSIUM 50 MG/1
50 TABLET ORAL DAILY
Qty: 90 TABLET | Refills: 1 | Status: SHIPPED | OUTPATIENT
Start: 2022-10-04 | End: 2023-03-01

## 2022-10-04 RX ORDER — INSULIN GLARGINE 100 [IU]/ML
INJECTION, SOLUTION SUBCUTANEOUS
Qty: 30 ML | Refills: 3 | Status: SHIPPED | OUTPATIENT
Start: 2022-10-04 | End: 2022-10-06

## 2022-10-04 RX ORDER — INSULIN GLARGINE-YFGN 100 [IU]/ML
INJECTION, SOLUTION SUBCUTANEOUS
Qty: 15 ML | Refills: 0 | Status: CANCELLED | OUTPATIENT
Start: 2022-10-04

## 2022-10-04 RX ORDER — FLASH GLUCOSE SENSOR
KIT MISCELLANEOUS
Qty: 6 EACH | Refills: 3 | Status: SHIPPED | OUTPATIENT
Start: 2022-10-04 | End: 2022-11-15

## 2022-10-04 ASSESSMENT — PAIN SCALES - GENERAL: PAINLEVEL: EXTREME PAIN (8)

## 2022-11-11 NOTE — PROGRESS NOTES
Outcome for 11/14/22 9:27 AM: Left Voicemail to read [employer] for linking jl instructions.  Outcome for 11/11/22 1:16 PM: Device upload instructions sent to patient via [employer]  Filomena Quezada CMA  Adult Endocrinology  [facility], [place]

## 2022-11-14 NOTE — PATIENT INSTRUCTIONS
Plan  Insulin glargine 35 units twice daily   Trulicity 3 mg every 7 days   Jardiance 10 mg daily  Continue with the same dose of Novolog and metformin    Check BG; fasting, before meals and bedtime.   Please let us know if any BG below 70 mg/dl   Blood work today     [facility]-Department of Endocrinology  Diabetes Educators:   Lucy Obrien RN and Cristela Raygoza RN  Clinic Nurse: STACEY Warren  CMA's: Chandni   LPN: Adilia  Scheduling/Clinic phone number : [phone]   Clinic Fax: [phone]  On-Call Endocrinology

## 2022-11-15 ENCOUNTER — OFFICE VISIT (OUTPATIENT)
Dept: ENDOCRINOLOGY | Facility: CLINIC | Age: 52
End: 2022-11-15
Attending: PHYSICIAN ASSISTANT
Payer: COMMERCIAL

## 2022-11-15 VITALS
BODY MASS INDEX: 37.26 KG/M2 | WEIGHT: 263.4 LBS | OXYGEN SATURATION: 95 % | HEART RATE: 86 BPM | DIASTOLIC BLOOD PRESSURE: 86 MMHG | SYSTOLIC BLOOD PRESSURE: 151 MMHG

## 2022-11-15 DIAGNOSIS — N18.2 TYPE 2 DIABETES MELLITUS WITH STAGE 2 CHRONIC KIDNEY DISEASE, WITHOUT LONG-TERM CURRENT USE OF INSULIN (H): Primary | ICD-10-CM

## 2022-11-15 DIAGNOSIS — E11.22 TYPE 2 DIABETES MELLITUS WITH STAGE 2 CHRONIC KIDNEY DISEASE, WITHOUT LONG-TERM CURRENT USE OF INSULIN (H): Primary | ICD-10-CM

## 2022-11-15 DIAGNOSIS — E66.01 MORBID OBESITY (H): ICD-10-CM

## 2022-11-15 LAB
FASTING STATUS PATIENT QL REPORTED: NO
GLUCOSE BLD-MCNC: 201 MG/DL (ref 70–99)
POTASSIUM BLD-SCNC: 4.6 MMOL/L (ref 3.4–5.3)
TSH SERPL DL<=0.005 MIU/L-ACNC: 2.2 MU/L (ref 0.4–4)

## 2022-11-15 PROCEDURE — 84443 ASSAY THYROID STIM HORMONE: CPT | Performed by: INTERNAL MEDICINE

## 2022-11-15 PROCEDURE — 82947 ASSAY GLUCOSE BLOOD QUANT: CPT | Performed by: INTERNAL MEDICINE

## 2022-11-15 PROCEDURE — 99244 OFF/OP CNSLTJ NEW/EST MOD 40: CPT | Performed by: INTERNAL MEDICINE

## 2022-11-15 PROCEDURE — 84132 ASSAY OF SERUM POTASSIUM: CPT | Performed by: INTERNAL MEDICINE

## 2022-11-15 PROCEDURE — 84681 ASSAY OF C-PEPTIDE: CPT | Performed by: INTERNAL MEDICINE

## 2022-11-15 PROCEDURE — 36415 COLL VENOUS BLD VENIPUNCTURE: CPT | Performed by: INTERNAL MEDICINE

## 2022-11-15 RX ORDER — FLASH GLUCOSE SENSOR
KIT MISCELLANEOUS
Qty: 6 EACH | Refills: 3 | OUTPATIENT
Start: 2022-11-15 | End: 2022-11-15

## 2022-11-15 RX ORDER — BLOOD-GLUCOSE SENSOR
1 EACH MISCELLANEOUS
Qty: 6 EACH | Refills: 3 | Status: SHIPPED | OUTPATIENT
Start: 2022-11-15 | End: 2022-11-28

## 2022-11-15 RX ORDER — FLASH GLUCOSE SENSOR
KIT MISCELLANEOUS
Qty: 6 EACH | Refills: 3 | Status: SHIPPED | OUTPATIENT
Start: 2022-11-15 | End: 2022-11-15

## 2022-11-15 RX ORDER — INSULIN GLARGINE-YFGN 100 [IU]/ML
35 INJECTION, SOLUTION SUBCUTANEOUS 2 TIMES DAILY
Qty: 60 ML | Refills: 1 | Status: SHIPPED | OUTPATIENT
Start: 2022-11-15 | End: 2023-03-01

## 2022-11-15 RX ORDER — DULAGLUTIDE 3 MG/.5ML
3 INJECTION, SOLUTION SUBCUTANEOUS
Qty: 2 ML | Refills: 1 | Status: SHIPPED
Start: 2022-11-15 | End: 2023-03-03

## 2022-11-15 RX ORDER — INSULIN GLARGINE-YFGN 100 [IU]/ML
INJECTION, SOLUTION SUBCUTANEOUS
COMMUNITY
Start: 2022-10-16 | End: 2022-11-15

## 2022-11-15 NOTE — PROGRESS NOTES
Endocrinology Clinic Visit    Chief Complaint: Consult and Diabetes     Information obtained from:Patient      Assessment/Treatment Plan:      Type 2 diabetes with other specified complications on long-term insulin treatment  ObesityBody mass index is 37.26 kg/m .  Detailed blood sugar data not available for this visit however significant blood sugar fluctuating between low 100s-300s.  Currently on insulin glargine 50 units in the morning and 30 units at night, Trulicity 1.5 mg daily, metformin 1000 g daily.  Insulin added over the last 1 year.  Hemoglobin A1c 10.1.  Check C-peptide and glucose levels consistently since insulin production.  Given BMI increasing GLP-1 agonist as tolerated would be helpful from hyperglycemia standpoint and also weight control.  Add SGLT2 inhibitors. Risk-benefit of this medication including bladder infection and yeast infection around the genitalia discussed.  We will reassess the blood sugar data in a few days.  Continuous glucose monitor information provided.      Plan  Reduce insulin glargine 35 units twice daily   Increase Trulicity 3 mg every 7 days   Start Jardiance 10 mg daily  Continue with the same dose of Novolog and metformin  Send blood sugar readings in 3 weeks.    Check BG; fasting, before meals and bedtime.   Please let us know if any BG below 70 mg/dl     Home blood pressure controlled per report.  Blood pressure was high today however it was not repeated before his discharge.  Component      Latest Ref Rng & Units 11/15/2022   Glucose      70 - 99 mg/dL 201 (H)   Patient Fasting > 8hrs?       No   C-Peptide      0.9 - 6.9 ng/mL 3.7   TSH      0.40 - 4.00 mU/L 2.20     Return to clinic in 2 months.    Test and/or medications prescribed today:  Orders Placed This Encounter   Procedures     C-peptide     Glucose     TSH with free T4 reflex         Sean Moscoso MD  Staff Endocrinologist    Division of Endocrinology and Diabetes      Subjective:         HPI: Paul MONROY  Angel is a 52 year old male with history of type 2 diabetes who is seen in consultation at Dominik Petersongh Martha's request for the same.    Diabetes diagnosed about 15 years ago.   Detailed blood sugar data not available for this visit however significant blood sugar fluctuating between low 100s-300s.  Currently on insulin glargine 50 units in the morning and 30 units at night, Trulicity 1.5 mg daily, metformin 1000 g daily.  Insulin added over the last 1 year.  Hemoglobin A1c 10.1.  Has been on glipizide prior to adding insulin.  Continuous glucose monitor supplies have not been covered therefore he has not been using over the last few weeks.  Allergies   Allergen Reactions     Zantac        Current Outpatient Medications   Medication Sig Dispense Refill     Ascorbic Acid (VITAMIN C PO)        aspirin 81 MG tablet Take 1 tablet by mouth daily. 30 tablet      atorvastatin (LIPITOR) 20 MG tablet Take 1 tablet (20 mg) by mouth daily 90 tablet 3     blood glucose (NO BRAND SPECIFIED) test strip Use to test blood sugar 2 times daily or as directed. 200 each 4     blood glucose monitoring (ACCU-CHEK FASTCLIX) lancets TEST DAILY AND AS NEEDED 100 each 11     continuous blood glucose monitoring (FREESTYLE ENRIQUE) sensor For use with Freestyle Enrique Flash  for continuous monitioring of blood glucose levels. Replace sensor every 10 days. 6 each 3     Dulaglutide (TRULICITY) 3 MG/0.5ML SOPN Inject 3 mg Subcutaneous every 7 days 2 mL 1     empagliflozin (JARDIANCE) 10 MG TABS tablet Take 1 tablet (10 mg) by mouth daily 30 tablet 0     insulin aspart (NOVOLOG PEN) 100 UNIT/ML pen Inject 12 units prior to breakfast, 16 units prior to lunch, 8 units prior to supper 15 mL 2     Insulin Glargine-yfgn (SEMGLEE, YFGN,) 100 UNIT/ML SOPN Inject 35 Units Subcutaneous 2 times daily 60 mL 1     insulin pen needle (B-D U/F) 31G X 5 MM miscellaneous Inject Subcutaneous 4 times daily ( 3 times daily with meals and at bedtime) 400 each 3      losartan (COZAAR) 50 MG tablet Take 1 tablet (50 mg) by mouth daily 90 tablet 1     MAGNESIUM PO        metFORMIN (GLUCOPHAGE) 1000 MG tablet Take 1 tablet (1,000 mg) by mouth 2 times daily (with meals) 180 tablet 1     Multiple Vitamins-Minerals (ZINC PO)        QUERCETIN PO        VITAMIN D PO        Continuous Blood Gluc  (FREESTYLE GEREMIAS READER) AMY 1 Device daily (Patient not taking: Reported on 11/15/2022) 1 each 0       Review of Systems     8 point review system (Constitutional, HENT, Eyes, Respiratory, Cardiovascular, Gastrointestinal, Genitourinary, Musculoskeletal,Neurological, Psychiatric/Behavioural, Endocrine) is negative or is as per HPI above    Past Medical History:   Diagnosis Date     Diabetes type 2, uncontrolled      Gout     diet controlled     HTN, goal below 140/90      Hyperlipidemia      Obesity    Past surgical history, social and family history reviewed.      Objective:   BP (!) 151/86 (BP Location: Left arm, Patient Position: Sitting, Cuff Size: Adult Large)   Pulse 86   Wt 119.5 kg (263 lb 6.4 oz)   SpO2 95%   BMI 37.26 kg/m    Wt Readings from Last 10 Encounters:   11/15/22 119.5 kg (263 lb 6.4 oz)   10/04/22 119.7 kg (263 lb 12.8 oz)   06/09/22 121.3 kg (267 lb 6.4 oz)   02/09/22 119.3 kg (263 lb)   09/09/21 119.7 kg (264 lb)   04/14/21 118.6 kg (261 lb 6.4 oz)   04/12/21 120.4 kg (265 lb 8 oz)   01/04/21 118.6 kg (261 lb 8 oz)   07/28/20 119.9 kg (264 lb 6.4 oz)   02/03/20 117 kg (258 lb)   Constitutional: Pleasant no acute cardiopulmonary distress.   EYES: anicteric, normal extra-ocular movements, no lid lag or retraction.  HEENT:  Thyroid examination: unremarkable.   Cardiovascular: RRR, S1, S2 normal.   Pulmonary/Chest: CTAB. No wheezing or rales.   Abdominal: +BS. Non tender to palpation.  Stretch marks:   Neurological: Alert and oriented.  No tremor and reflexes are symmetrical bilaterally and within the normal limits. Muscle strength 5/5.   Extremities: No  edema.  Psychological: appropriate mood and affect     In House Labs:   Lab Results   Component Value Date    A1C 10.3 10/04/2022    A1C 9.1 06/09/2022    A1C 8.1 02/09/2022    A1C 7.9 09/09/2021    A1C 7.5 04/14/2021    A1C 7.9 04/08/2021    A1C 9.1 01/04/2021    A1C 8.5 07/28/2020    A1C 6.9 08/27/2019       TSH   Date Value Ref Range Status   11/15/2022 2.20 0.40 - 4.00 mU/L Final   08/27/2019 1.76 0.40 - 4.00 mU/L Final   06/27/2017 1.51 0.40 - 4.00 mU/L Final   12/29/2015 1.78 0.40 - 4.00 mU/L Final   07/30/2013 2.20 0.4 - 5.0 mU/L Final   08/05/2010 1.35 0.4 - 5.0 mU/L Final       Creatinine   Date Value Ref Range Status   10/04/2022 1.02 0.66 - 1.25 mg/dL Final   04/14/2021 1.01 0.66 - 1.25 mg/dL Final   ]    Recent Labs   Lab Test 10/04/22  0741 02/09/22  0703 12/29/15  0751 10/24/14  0748   CHOL 133 127   < > 173   HDL 38* 43   < > 47   LDL 60 61   < > 99   TRIG 173* 114   < > 134   CHOLHDLRATIO  --   --   --  3.7    < > = values in this interval not displayed.       This note has been dictated using voice recognition software.  As a result, there may be errors in the documentation that have gone undetected.  Please consider this when interpreting information in this documentation.

## 2022-11-15 NOTE — NURSING NOTE
Paul Ortiz's goals for this visit include:   Chief Complaint   Patient presents with     Consult     Diabetes     He requests these members of his care team be copied on today's visit information: Yes    PCP: Dominik Thomas    Referring Provider:  Dominik Thomas PA-C  [id number] [place] [address]    BP (!) 151/86 (BP Location: Left arm, Patient Position: Sitting, Cuff Size: Adult Large)   Pulse 86   Wt 119.5 kg (263 lb 6.4 oz)   SpO2 95%   BMI 37.26 kg/m      Do you need any medication refills at today's visit? Yes

## 2022-11-15 NOTE — LETTER
11/15/2022         RE: Paul Otriz  [id number] [facility] [id number]        Dear Colleague,    Thank you for referring your patient, Paul Ortiz, to the [facility]. Please see a copy of my visit note below.    Outcome for 11/14/22 9:27 AM: Left Voicemail to read "[employer] for linking jl instructions.  Outcome for 11/11/22 1:16 PM: Device upload instructions sent to patient via vozero  Filomena Quezada CMA  Adult Endocrinology  [facility], [place]      Endocrinology Clinic Visit    Chief Complaint: Consult and Diabetes     Information obtained from:Patient      Assessment/Treatment Plan:      Type 2 diabetes with other specified complications on long-term insulin treatment  ObesityBody mass index is 37.26 kg/m .  Detailed blood sugar data not available for this visit however significant blood sugar fluctuating between low 100s-300s.  Currently on insulin glargine 50 units in the morning and 30 units at night, Trulicity 1.5 mg daily, metformin 1000 g daily.  Insulin added over the last 1 year.  Hemoglobin A1c 10.1.  Check C-peptide and glucose levels consistently since insulin production.  Given BMI increasing GLP-1 agonist as tolerated would be helpful from hyperglycemia standpoint and also weight control.  Add SGLT2 inhibitors. Risk-benefit of this medication including bladder infection and yeast infection around the genitalia discussed.  We will reassess the blood sugar data in a few days.  Continuous glucose monitor information provided.      Plan  Reduce insulin glargine 35 units twice daily   Increase Trulicity 3 mg every 7 days   Start Jardiance 10 mg daily  Continue with the same dose of Novolog and metformin  Send blood sugar readings in 3 weeks.    Check BG; fasting, before meals and bedtime.   Please let us know if any BG below 70 mg/dl     Home blood pressure controlled per report.  Blood pressure was high today however it was not repeated before his  discharge.  Component      Latest Ref Rng & Units 11/15/2022   Glucose      70 - 99 mg/dL 201 (H)   Patient Fasting > 8hrs?       No   C-Peptide      0.9 - 6.9 ng/mL 3.7   TSH      0.40 - 4.00 mU/L 2.20     Return to clinic in 2 months.    Test and/or medications prescribed today:  Orders Placed This Encounter   Procedures     C-peptide     Glucose     TSH with free T4 reflex         Sean Moscoso MD  Staff Endocrinologist    Division of Endocrinology and Diabetes      Subjective:         HPI: Paul Ortiz is a 52 year old male with history of type 2 diabetes who is seen in consultation at [facility]'s request for the same.    Diabetes diagnosed about 15 years ago.   Detailed blood sugar data not available for this visit however significant blood sugar fluctuating between low 100s-300s.  Currently on insulin glargine 50 units in the morning and 30 units at night, Trulicity 1.5 mg daily, metformin 1000 g daily.  Insulin added over the last 1 year.  Hemoglobin A1c 10.1.  Has been on glipizide prior to adding insulin.  Continuous glucose monitor supplies have not been covered therefore he has not been using over the last few weeks.  Allergies   Allergen Reactions     Zantac        Current Outpatient Medications   Medication Sig Dispense Refill     Ascorbic Acid (VITAMIN C PO)        aspirin 81 MG tablet Take 1 tablet by mouth daily. 30 tablet      atorvastatin (LIPITOR) 20 MG tablet Take 1 tablet (20 mg) by mouth daily 90 tablet 3     blood glucose (NO BRAND SPECIFIED) test strip Use to test blood sugar 2 times daily or as directed. 200 each 4     blood glucose monitoring (ACCU-CHEK FASTCLIX) lancets TEST DAILY AND AS NEEDED 100 each 11     continuous blood glucose monitoring (FREESTYLE GEREMIAS) sensor For use with Freestyle Geremias Flash  for continuous monitioring of blood glucose levels. Replace sensor every 10 days. 6 each 3     Dulaglutide (TRULICITY) 3 MG/0.5ML SOPN Inject 3 mg Subcutaneous  every 7 days 2 mL 1     empagliflozin (JARDIANCE) 10 MG TABS tablet Take 1 tablet (10 mg) by mouth daily 30 tablet 0     insulin aspart (NOVOLOG PEN) 100 UNIT/ML pen Inject 12 units prior to breakfast, 16 units prior to lunch, 8 units prior to supper 15 mL 2     Insulin Glargine-yfgn (SEMGLEE, YFGN,) 100 UNIT/ML SOPN Inject 35 Units Subcutaneous 2 times daily 60 mL 1     insulin pen needle (B-D U/F) 31G X 5 MM miscellaneous Inject Subcutaneous 4 times daily ( 3 times daily with meals and at bedtime) 400 each 3     losartan (COZAAR) 50 MG tablet Take 1 tablet (50 mg) by mouth daily 90 tablet 1     MAGNESIUM PO        metFORMIN (GLUCOPHAGE) 1000 MG tablet Take 1 tablet (1,000 mg) by mouth 2 times daily (with meals) 180 tablet 1     Multiple Vitamins-Minerals (ZINC PO)        QUERCETIN PO        VITAMIN D PO        Continuous Blood Gluc  (FREESTYLE GEREMIAS READER) AMY 1 Device daily (Patient not taking: Reported on 11/15/2022) 1 each 0       Review of Systems     8 point review system (Constitutional, HENT, Eyes, Respiratory, Cardiovascular, Gastrointestinal, Genitourinary, Musculoskeletal,Neurological, Psychiatric/Behavioural, Endocrine) is negative or is as per HPI above    Past Medical History:   Diagnosis Date     Diabetes type 2, uncontrolled      Gout     diet controlled     HTN, goal below 140/90      Hyperlipidemia      Obesity    Past surgical history, social and family history reviewed.      Objective:   BP (!) 151/86 (BP Location: Left arm, Patient Position: Sitting, Cuff Size: Adult Large)   Pulse 86   Wt 119.5 kg (263 lb 6.4 oz)   SpO2 95%   BMI 37.26 kg/m    Wt Readings from Last 10 Encounters:   11/15/22 119.5 kg (263 lb 6.4 oz)   10/04/22 119.7 kg (263 lb 12.8 oz)   06/09/22 121.3 kg (267 lb 6.4 oz)   02/09/22 119.3 kg (263 lb)   09/09/21 119.7 kg (264 lb)   04/14/21 118.6 kg (261 lb 6.4 oz)   04/12/21 120.4 kg (265 lb 8 oz)   01/04/21 118.6 kg (261 lb 8 oz)   07/28/20 119.9 kg (264 lb 6.4  oz)   02/03/20 117 kg (258 lb)   Constitutional: Pleasant no acute cardiopulmonary distress.   EYES: anicteric, normal extra-ocular movements, no lid lag or retraction.  HEENT:  Thyroid examination: unremarkable.   Cardiovascular: RRR, S1, S2 normal.   Pulmonary/Chest: CTAB. No wheezing or rales.   Abdominal: +BS. Non tender to palpation.  Stretch marks:   Neurological: Alert and oriented.  No tremor and reflexes are symmetrical bilaterally and within the normal limits. Muscle strength 5/5.   Extremities: No edema.  Psychological: appropriate mood and affect     In House Labs:   Lab Results   Component Value Date    A1C 10.3 10/04/2022    A1C 9.1 06/09/2022    A1C 8.1 02/09/2022    A1C 7.9 09/09/2021    A1C 7.5 04/14/2021    A1C 7.9 04/08/2021    A1C 9.1 01/04/2021    A1C 8.5 07/28/2020    A1C 6.9 08/27/2019       TSH   Date Value Ref Range Status   11/15/2022 2.20 0.40 - 4.00 mU/L Final   08/27/2019 1.76 0.40 - 4.00 mU/L Final   06/27/2017 1.51 0.40 - 4.00 mU/L Final   12/29/2015 1.78 0.40 - 4.00 mU/L Final   07/30/2013 2.20 0.4 - 5.0 mU/L Final   08/05/2010 1.35 0.4 - 5.0 mU/L Final       Creatinine   Date Value Ref Range Status   10/04/2022 1.02 0.66 - 1.25 mg/dL Final   04/14/2021 1.01 0.66 - 1.25 mg/dL Final   ]    Recent Labs   Lab Test 10/04/22  0741 02/09/22  0703 12/29/15  0751 10/24/14  0748   CHOL 133 127   < > 173   HDL 38* 43   < > 47   LDL 60 61   < > 99   TRIG 173* 114   < > 134   CHOLHDLRATIO  --   --   --  3.7    < > = values in this interval not displayed.       This note has been dictated using voice recognition software.  As a result, there may be errors in the documentation that have gone undetected.  Please consider this when interpreting information in this documentation.          Again, thank you for allowing me to participate in the care of your patient.        Sincerely,        Sean Moscoso MD

## 2022-11-16 LAB — C PEPTIDE SERPL-MCNC: 3.7 NG/ML (ref 0.9–6.9)

## 2022-11-18 NOTE — RESULT ENCOUNTER NOTE
Dear Paul,     Here are your recent results.   #1 the C-peptide level is within the normal limits.  This indicates that medications like empagliflozin or Jardiance, Trulicity and metformin are helpful in managing your diabetes in addition to the insulin therapy.  We will do further discuss at your future upcoming appointment.  2.  Thyroid blood work result is within the normal limits.  Glucose was high at 201.  Please let us know if you have any questions or concerns.    Regards,  Sean Moscoso MD

## 2022-11-28 ENCOUNTER — ALLIED HEALTH/NURSE VISIT (OUTPATIENT)
Dept: EDUCATION SERVICES | Facility: CLINIC | Age: 52
End: 2022-11-28
Payer: COMMERCIAL

## 2022-11-28 DIAGNOSIS — E11.22 TYPE 2 DIABETES MELLITUS WITH STAGE 2 CHRONIC KIDNEY DISEASE, WITH LONG-TERM CURRENT USE OF INSULIN (H): Primary | ICD-10-CM

## 2022-11-28 DIAGNOSIS — E11.22 TYPE 2 DIABETES MELLITUS WITH STAGE 2 CHRONIC KIDNEY DISEASE, WITHOUT LONG-TERM CURRENT USE OF INSULIN (H): ICD-10-CM

## 2022-11-28 DIAGNOSIS — Z79.4 TYPE 2 DIABETES MELLITUS WITH STAGE 2 CHRONIC KIDNEY DISEASE, WITH LONG-TERM CURRENT USE OF INSULIN (H): Primary | ICD-10-CM

## 2022-11-28 DIAGNOSIS — N18.2 TYPE 2 DIABETES MELLITUS WITH STAGE 2 CHRONIC KIDNEY DISEASE, WITH LONG-TERM CURRENT USE OF INSULIN (H): Primary | ICD-10-CM

## 2022-11-28 DIAGNOSIS — N18.2 TYPE 2 DIABETES MELLITUS WITH STAGE 2 CHRONIC KIDNEY DISEASE, WITHOUT LONG-TERM CURRENT USE OF INSULIN (H): ICD-10-CM

## 2022-11-28 PROCEDURE — G0108 DIAB MANAGE TRN  PER INDIV: HCPCS

## 2022-11-28 RX ORDER — BLOOD-GLUCOSE SENSOR
1 EACH MISCELLANEOUS
Qty: 6 EACH | Refills: 3 | Status: SHIPPED | OUTPATIENT
Start: 2022-11-28 | End: 2023-06-08

## 2022-11-29 NOTE — PROGRESS NOTES
"Diabetes Self-Management Education & Support    Presents for: Individual review    Type of Service: In Person Visit    Assessment Type:   ASSESSMENT:  Patient diagnosed with Type 2 Diabetes  In 2007. Referred by fannie, to [facility] for review of diabetes self-mgmt education. Pt verbalizes it \"has been years\" since he has met with an educator and feels he needs to review all aspects of diabetes self mgmt care.     Currently on Semglee, 35 units in the morning and 35 units in the evening, Novolog, 18/16/12 (does not use a sliding scale), Trulicity, 3.0mg once a week, Metformin 1000mg twice a day, Jardiance 10mg daily.     Has has used the Enrique 14-Day sensor in the past. Ran out of sensors several weeks ago. Has not yet refilled Rx. Interested in the Enrique 3. Does not have back-up meter at home. States he found one Enrique sensor the day before appointment and inserted it yesterday. Readings available from sensor for less than 24 hours. Pt verbally states bg usually runs in the 130's - 160's, fasting, and in the 300's the rest of the day.     Former smoker. Quit 24 years ago. Works full time as a  for food equipment [employer]. States job is very stressful.        Patient's most recent   Lab Results   Component Value Date    A1C 10.3 10/04/2022    A1C 7.5 04/14/2021     is not meeting goal of <7.0      Diabetes knowledge and skills assessment:   Patient is knowledgeable in diabetes management concepts related to: Monitoring and Taking Medication    Continue education with the following diabetes management concepts: Healthy Eating, Being Active and Problem Solving    Based on learning assessment above, most appropriate setting for further diabetes education would be: Individual setting.      PLAN  - Semglee: AM dose; 35 to 40 units, PM dose; 35 to 38 units.  - Novolog: Give set meal dose of 15/15/15.  - Novolog: Begin to use a sliding scale, include additonal insulin with set meal dose  If pre-meal bg < 150, " "no need to add extra insulin  151-180: add 2 units  181-210: +3u  211-240: + 4u  241-270: + 5u  271-310: + 6u  311-340: + 7u  Above 340: + 8u (max correction dose)    - Given a Contour Next Gen meter with sample strips.     - Rx sent to [facility] for Enrique 3 sensors. Pt wlil call once shipped to home, to schedule training.       Topics to cover at upcoming visits: Healthy Eating, Being Active, Monitoring and Taking Medication    Follow-up: Return to see [facility] in 3 weeks, for bg/medication review and continued diabetes self mgmt education.       Education Materials Provided:  [employer] Healthy Living with Diabetes Book      SUBJECTIVE/OBJECTIVE:  Presents for: Individual review  Accompanied by: Self  Diabetes education in the past 24mo: No  Focus of Visit: Other (Requesting a refresher on diabetes self mgmt care.)  Diabetes type: Type 2  Date of diagnosis: 2007  Disease course: Stable  How confident are you filling out medical forms by yourself:: Extremely  Diabetes management related comments/concerns: Elevated bg levels  Transportation concerns: No  Difficulty affording diabetes medication?: No  Difficulty affording diabetes testing supplies?: No  Other concerns:: None  Cultural Influences/Ethnic Background:  Not  or       Diabetes Symptoms & Complications:  Fatigue: No  Neuropathy: No  Polydipsia: No  Polyphagia: No  Polyuria: No  Visual change: No  Slow healing wounds: No  Complications assessed today?: No    Patient Problem List and Family Medical History reviewed for relevant medical history, current medical status, and diabetes risk factors.    Vitals:  There were no vitals taken for this visit.  Estimated body mass index is 37.26 kg/m  as calculated from the following:    Height as of 6/9/22: 1.791 m (5' 10.5\").    Weight as of 11/15/22: 119.5 kg (263 lb 6.4 oz).   Last 3 BP:   BP Readings from Last 3 Encounters:   11/15/22 (!) 151/86   10/04/22 124/87   06/09/22 136/83       History   Smoking " Status     Former     Packs/day: 1.50     Years: 13.00     Types: Cigarettes     Quit date: 8/5/1997   Smokeless Tobacco     Never       Labs:  Lab Results   Component Value Date    A1C 10.3 10/04/2022    A1C 7.5 04/14/2021     Lab Results   Component Value Date     11/15/2022    GLC 99 04/14/2021     Lab Results   Component Value Date    LDL 60 10/04/2022    LDL 71 01/04/2021     HDL Cholesterol   Date Value Ref Range Status   01/04/2021 46 >39 mg/dL Final     Direct Measure HDL   Date Value Ref Range Status   10/04/2022 38 (L) >=40 mg/dL Final   ]  GFR Estimate   Date Value Ref Range Status   10/04/2022 88 >60 mL/min/1.73m2 Final     Comment:     Effective December 21, 2021 eGFRcr in adults is calculated using the 2021 CKD-EPI creatinine equation which includes age and gender (Adri soliz al., NEJ, DOI: 10.1056/NYRVbr5280916)   04/14/2021 85 >60 mL/min/[1.73_m2] Final     Comment:     Non  GFR Calc  Starting 12/18/2018, serum creatinine based estimated GFR (eGFR) will be   calculated using the Chronic Kidney Disease Epidemiology Collaboration   (CKD-EPI) equation.       GFR Estimate If Black   Date Value Ref Range Status   04/14/2021 >90 >60 mL/min/[1.73_m2] Final     Comment:      GFR Calc  Starting 12/18/2018, serum creatinine based estimated GFR (eGFR) will be   calculated using the Chronic Kidney Disease Epidemiology Collaboration   (CKD-EPI) equation.       Lab Results   Component Value Date    CR 1.02 10/04/2022    CR 1.01 04/14/2021     No results found for: MICROALBUMIN    Healthy Eating:  Cultural/Presybeterian diet restrictions?: No  Meal planning/habits: None  Has patient met with a dietitian in the past?: No    Being Active:  Being Active Assessed Today: No    Monitoring:  Monitoring Assessed Today: Yes  Did patient bring glucose meter to appointment? : Yes  Blood Glucose Meter: CGM (Enrique 14-day)  Times checking blood sugar at home (number): 5+ (When using Enrique, scans  "bg multiple times a day. Does not have a back-up meter at home.)  Blood glucose trend: Increasing        Taking Medications:  Diabetes Medication(s)     Biguanides       metFORMIN (GLUCOPHAGE) 1000 MG tablet    Take 1 tablet (1,000 mg) by mouth 2 times daily (with meals)    Insulin       insulin aspart (NOVOLOG PEN) 100 UNIT/ML pen    Inject 12 units prior to breakfast, 16 units prior to lunch, 8 units prior to supper     Insulin Glargine-yfgn (SEMGLEE, YFGN,) 100 UNIT/ML SOPN    Inject 35 Units Subcutaneous 2 times daily    Sodium-Glucose Co-Transporter 2 (SGLT2) Inhibitors       empagliflozin (JARDIANCE) 10 MG TABS tablet    Take 1 tablet (10 mg) by mouth daily    Incretin Mimetic Agents       Dulaglutide (TRULICITY) 3 MG/0.5ML SOPN    Inject 3 mg Subcutaneous every 7 days          Taking Medication Assessed Today: Yes  Current Treatments: Insulin Injections, Non-insulin Injectables, Oral Medication (taken by mouth)  Dose schedule: Pre-breakfast, Pre-lunch, Pre-dinner, At bedtime  Given by: Patient  Injection/Infusion sites: Abdomen  Problems taking diabetes medications regularly?: No  Diabetes medication side effects?: No    Problem Solving:  Problem Solving Assessed Today: No  Is the patient at risk for hypoglycemia?: Yes  Hypoglycemia Frequency: Never (Patient states \"it has been a long time\" since he has experienced hypoglycemia.)  Hypoglycemia Treatment: Other food  Medical ID: No  Does patient have glucagon emergency kit?: No  Is the patient at risk for DKA?: No    Hypoglycemia symptoms  Dizziness or Light-Headedness: Yes  Feeling shaky: Yes    Hypoglycemia Complications  Blackouts: No  Hospitalization: No  Nocturnal hypoglycemia: No  Required assistance: No  Required glucagon injection: No  Seizures: No    Reducing Risks:  Reducing Risks Assessed Today: No  Diabetes Risks: Age over 45 years, Sedentary Lifestyle, Hyperlipidemia  CAD Risks: Diabetes Mellitus, Dyslipidemia, Hypertension, Male sex, Obesity, " Sedentary lifestyle  Has dilated eye exam at least once a year?: No (Aware he needs t schle appt with opthomology)  Feet checked by healthcare provider in the last year?: Yes    Healthy Coping:  Healthy Coping Assessed Today: No  Emotional response to diabetes: Ready to learn  Informal Support system:: Family  Stage of change: PREPARATION (Decided to change - considering how)  Support resources: None  Patient Activation Measure Survey Score:  JOSE Score (Last Two) 1/25/2012   JOSE Raw Score 40   Activation Score 60   JOSE Level 3       Time Spent: 60 minutes  Encounter Type: Individual    Any diabetes medication dose changes were made via the CDE Protocol per the patient's endocrinology provider. A copy of this encounter was shared with the provider.    Paul PORFIRIO Ortiz comes into clinic today at the request of Dr Moscoso, Ordering Provider for Pt Teaching on diabetes self mgmt care.       This service provided today was under the supervising provider of the day Dr Seth, who was available if needed.    Lucy Obrien, RN, BSN, [facility] [place]   Certified Diabetes Care/  [facility]

## 2022-12-13 DIAGNOSIS — N18.2 TYPE 2 DIABETES MELLITUS WITH STAGE 2 CHRONIC KIDNEY DISEASE, WITHOUT LONG-TERM CURRENT USE OF INSULIN (H): ICD-10-CM

## 2022-12-13 DIAGNOSIS — E11.22 TYPE 2 DIABETES MELLITUS WITH STAGE 2 CHRONIC KIDNEY DISEASE, WITHOUT LONG-TERM CURRENT USE OF INSULIN (H): ICD-10-CM

## 2022-12-14 NOTE — TELEPHONE ENCOUNTER
JARDIANCE 10 MG TABLET  Last Written Prescription Date:  11/15/2022  Last Fill Quantity: 30,   # refills: 0  Last Office Visit : 11/15/2022  Future Office visit:   1/24/2023    Routing refill request to provider for review/approval because:  Only a 30 day supply sent to pharm last order.  Would Provider like a 90 day supply with refills for Pt care.   Please review and send new order.       Wendy El RN  Central Triage Red Flags/Med Refills

## 2022-12-28 ENCOUNTER — ALLIED HEALTH/NURSE VISIT (OUTPATIENT)
Dept: EDUCATION SERVICES | Facility: CLINIC | Age: 52
End: 2022-12-28
Payer: COMMERCIAL

## 2022-12-28 DIAGNOSIS — N18.2 TYPE 2 DIABETES MELLITUS WITH STAGE 2 CHRONIC KIDNEY DISEASE, WITH LONG-TERM CURRENT USE OF INSULIN (H): Primary | ICD-10-CM

## 2022-12-28 DIAGNOSIS — Z79.4 TYPE 2 DIABETES MELLITUS WITH STAGE 2 CHRONIC KIDNEY DISEASE, WITH LONG-TERM CURRENT USE OF INSULIN (H): Primary | ICD-10-CM

## 2022-12-28 DIAGNOSIS — E11.22 TYPE 2 DIABETES MELLITUS WITH STAGE 2 CHRONIC KIDNEY DISEASE, WITH LONG-TERM CURRENT USE OF INSULIN (H): Primary | ICD-10-CM

## 2022-12-28 PROCEDURE — G0108 DIAB MANAGE TRN  PER INDIV: HCPCS

## 2023-01-02 NOTE — PROGRESS NOTES
"Diabetes Self-Management Education & Support    Presents for:      Type of Service: In Person Visit    Assessment Type:   ASSESSMENT:  Patient returns today for follow up. Paul was diagnosed with Type 2 Diabetes  In . He was initially referred by his Endo to meet with the CDE for review of diabetes self-mgmt education. Pt stated \"it has been years\" since he has met with an educator and feels he needs to review all aspects of diabetes self mgmt care. Currently on Semglee, Novolog, Trulicity, Metformin and Jardiance.      Former smoker. Quit 24 years ago. Works full time as a  for [employer]. States job is very stressful. . Does a lot of home improvement work by self.       GEREMIAS Cgms downloaded and reviewed  Patient admits to missing several lunch doses of Novolog. Forgets to bring it with him when he eats out for lunch.      Ave B which corresponds to a GMI of 8.0.   46% readings in target. 54% above target. 0% below target.   Pattern noted of fairly consistent hyperglycemia post meal.     Discussed similarities and differences between the OmniPod 5 and Tandem pumps. Patient most interested in the OmniPod 5 due to lack of tubing.     Patient's most recent   Lab Results   Component Value Date    A1C 10.3 10/04/2022    A1C 7.5 2021     is not meeting goal of <7.0      Diabetes knowledge and skills assessment:   Patient is knowledgeable in diabetes management concepts related to: Monitoring and Taking Medication    Continue education with the following diabetes management concepts: Healthy Eating, Being Active and Problem Solving    Based on learning assessment above, most appropriate setting for further diabetes education would be: Individual setting.      PLAN  - No medication dose changes made. Emphasized using sliding scale.   - Take Novolog with you when you eat out. Inject in car if concerned about injecting in front of people. Eat within 30 minutes of " "injecting insulin.   - Advised to cut back on carb intake with all meals.   - Call clinic when and if a decision is made to proceed with ordering an insulin pump and continuous glucose senor.      Topics to cover at upcoming visits: Problem Solving    Follow-up:  Appt with Cde as, needed.  Has appt scheduled with Endo in Jan       Education Materials Provided:  OmniPod and Dexcom brochures.      SUBJECTIVE/OBJECTIVE:SUBJECTIVE/OBJECTIVE:  Presents for: Individual review  Accompanied by: Self  Diabetes education in the past 24mo: Yes  Focus of Visit: Other (Refresher on diabetes self mgmt care.)  Diabetes type: Type 2  Date of diagnosis: 2007  Disease course: Stable  How confident are you filling out medical forms by yourself: Extremely  Diabetes management related comments/concerns: Elevated bg levels  Transportation concerns: No  Difficulty affording diabetes medication?: No  Difficulty affording diabetes testing supplies?: No  Other concerns:: None  Cultural Influences/Ethnic Background:  Not  or       Diabetes Symptoms & Complications:  Fatigue: No  Neuropathy: No  Polydipsia: No  Polyphagia: No  Polyuria: No  Visual change: No  Slow healing wounds: No    Complications assessed today?:  No  Neuropathy: No  Polydipsia: No  Polyphagia: No  Polyuria: No  Visual change: No  Slow healing wounds: No  Autonomic neuropathy: No  CVA: No  Heart disease: No  Nephropathy: No  Peripheral neuropathy: No  Peripheral Vascular Disease: No  Retinopathy: No  Sexual dysfunction: --      Vitals:  There were no vitals taken for this visit.  Estimated body mass index is 37.26 kg/m  as calculated from the following:    Height as of 6/9/22: 1.791 m (5' 10.5\").    Weight as of 11/15/22: 119.5 kg (263 lb 6.4 oz).   Last 3 BP:   BP Readings from Last 3 Encounters:   11/15/22 (!) 151/86   10/04/22 124/87   06/09/22 136/83       History   Smoking Status     Former     Packs/day: 1.50     Years: 13.00     Types: Cigarettes     " Quit date: 8/5/1997   Smokeless Tobacco     Never       Labs:  Lab Results   Component Value Date    A1C 10.3 10/04/2022    A1C 7.5 04/14/2021     Lab Results   Component Value Date     11/15/2022    GLC 99 04/14/2021     Lab Results   Component Value Date    LDL 60 10/04/2022    LDL 71 01/04/2021     HDL Cholesterol   Date Value Ref Range Status   01/04/2021 46 >39 mg/dL Final     Direct Measure HDL   Date Value Ref Range Status   10/04/2022 38 (L) >=40 mg/dL Final   ]  GFR Estimate   Date Value Ref Range Status   10/04/2022 88 >60 mL/min/1.73m2 Final     Comment:     Effective December 21, 2021 eGFRcr in adults is calculated using the 2021 CKD-EPI creatinine equation which includes age and gender (Adri et al., NEJ, DOI: 10.1056/HRAJzy9009128)   04/14/2021 85 >60 mL/min/[1.73_m2] Final     Comment:     Non  GFR Calc  Starting 12/18/2018, serum creatinine based estimated GFR (eGFR) will be   calculated using the Chronic Kidney Disease Epidemiology Collaboration   (CKD-EPI) equation.       GFR Estimate If Black   Date Value Ref Range Status   04/14/2021 >90 >60 mL/min/[1.73_m2] Final     Comment:      GFR Calc  Starting 12/18/2018, serum creatinine based estimated GFR (eGFR) will be   calculated using the Chronic Kidney Disease Epidemiology Collaboration   (CKD-EPI) equation.       Lab Results   Component Value Date    CR 1.02 10/04/2022    CR 1.01 04/14/2021     No results found for: MICROALBUMIN    Healthy Eating:  Cultural/Taoism diet restrictions?: No  Meal planning/habits: None  How many times a week on average do you eat food made away from home (restaurant/take-out)?: 5+  Meals include: Breakfast, Lunch, Dinner  Breakfast: breakfast sandwich, yogurt, toast  Lunch: typically fast food  Dinner: [employer] delivered, or chicken/pork with potatoes and a vegetable  Beverages: Water, Coffee with half & half and Splenda, Diet soda. Occasional beer.     Being  "Active:  Barrier to exercise: Time    Monitoring:  Blood Glucose Meter: FreeStyle Enrique  Times checking blood sugar at home (number): 5+  Times checking blood sugar at home (per): Day  Blood glucose trend: Decreasing      Taking Medications:  Diabetes Medication(s)     Biguanides       metFORMIN (GLUCOPHAGE) 1000 MG tablet    Take 1 tablet (1,000 mg) by mouth 2 times daily (with meals)    Insulin       insulin aspart (NOVOLOG PEN) 100 UNIT/ML pen    Inject 12 units prior to breakfast, 16 units prior to lunch, 8 units prior to supper     Insulin Glargine-yfgn (SEMGLEE, YFGN,) 100 UNIT/ML SOPN    Inject 35 Units Subcutaneous 2 times daily    Sodium-Glucose Co-Transporter 2 (SGLT2) Inhibitors       empagliflozin (JARDIANCE) 10 MG TABS tablet    Take 1 tablet (10 mg) by mouth daily    Incretin Mimetic Agents       Dulaglutide (TRULICITY) 3 MG/0.5ML SOPN    Inject 3 mg Subcutaneous every 7 days          Problem Solving:  Problem Solving Assessed Today: Yes - Discussed taking Novolog with him when eating out. Inject in car prior to eating.  Make sure you eat within 20-30 minutes of injecting.   Is the patient at risk for hypoglycemia?: Yes  Hypoglycemia Frequency: Never (Patient states \"it has been a long time\" since he has experienced hypoglycemia.)  Hypoglycemia Treatment: Other food  Medical ID: No  Does patient have glucagon emergency kit?: No  Is the patient at risk for DKA?: No  Hypoglycemia symptoms  Dizziness or Light-Headedness: Yes  Feeling shaky: Yes     Hypoglycemia Complications  Blackouts: No  Hospitalization: No  Nocturnal hypoglycemia: No  Required assistance: No  Required glucagon injection: No  Seizures: No     Reducing Risks:  Reducing Risks Assessed Today: No  Diabetes Risks: Age over 45 years, Sedentary Lifestyle, Hyperlipidemia  CAD Risks: Diabetes Mellitus, Dyslipidemia, Hypertension, Male sex, Obesity, Sedentary lifestyle  Has dilated eye exam at least once a year?: No (Aware he needs t schle appt " with opthomology)  Feet checked by healthcare provider in the last year?: Yes    Healthy Coping:  Healthy Coping Assessed Today: No  Emotional response to diabetes: Ready to learn  Informal Support system:: Family  Stage of change: PREPARATION (Decided to change - considering how)  Support resources: None  Patient Activation Measure Survey Score:  JOSE Score (Last Two) 1/25/2012   JOSE Raw Score 40   Activation Score 60   JOSE Level 3         Time Spent: 75 minutes  Encounter Type: Individual    Any diabetes medication dose changes were made via the CDE Protocol per the patient's endocrinology provider. A copy of this encounter was shared with the provider.    Paul PORFIRIO Ortiz comes into clinic today at the request of Dr Moscoso, Ordering Provider for Pt Teaching and bg review.     This service provided today was under the supervising provider of the day Dr Marinelli, who was available if needed.    Lucy Obrien, RN, BSN, [facility]   Certified Diabetes Care/  [facility]

## 2023-01-02 NOTE — PATIENT INSTRUCTIONS
Continue current medication regimen.    Return to see Cde as needed.      Lucy Obrien, RN, BSN, [facility]   Certified Diabetes Care/  [facility]

## 2023-03-03 DIAGNOSIS — N18.2 TYPE 2 DIABETES MELLITUS WITH STAGE 2 CHRONIC KIDNEY DISEASE, WITHOUT LONG-TERM CURRENT USE OF INSULIN (H): ICD-10-CM

## 2023-03-03 DIAGNOSIS — E11.22 TYPE 2 DIABETES MELLITUS WITH STAGE 2 CHRONIC KIDNEY DISEASE, WITHOUT LONG-TERM CURRENT USE OF INSULIN (H): ICD-10-CM

## 2023-03-03 RX ORDER — DULAGLUTIDE 3 MG/.5ML
3 INJECTION, SOLUTION SUBCUTANEOUS
Qty: 2 ML | Refills: 1 | Status: SHIPPED | OUTPATIENT
Start: 2023-03-03 | End: 2023-03-07 | Stop reason: ALTCHOICE

## 2023-03-03 NOTE — TELEPHONE ENCOUNTER
Dulaglutide (TRULICITY) 3 MG/0.5ML SOPN  Last Written Prescription Date:   11/15/2022  Last Fill Quantity: 2,   # refills: 1  Last Office Visit :  11/15/2022  Future Office visit:   3/7/2023  Routing refill request to provider for review/approval because:  Drug not on the [facility], [facility] or [facility] refill protocol or controlled substance    Recent Labs   Lab Test 10/04/22  0741   A1C 10.3*       Wendy El RN  Central Triage Red Flags/Med Refills

## 2023-03-06 NOTE — PROGRESS NOTES
Outcome for 03/06/23 10:14 AM: Enrique emailed to provider  Muna Vo MA    Patient is showing 3/5 [place]CM met. BP out range and A1c not in range   Muna Vo MA

## 2023-03-07 ENCOUNTER — VIRTUAL VISIT (OUTPATIENT)
Dept: ENDOCRINOLOGY | Facility: CLINIC | Age: 53
End: 2023-03-07
Payer: COMMERCIAL

## 2023-03-07 ENCOUNTER — TELEPHONE (OUTPATIENT)
Dept: ENDOCRINOLOGY | Facility: CLINIC | Age: 53
End: 2023-03-07

## 2023-03-07 DIAGNOSIS — E66.01 MORBID OBESITY (H): Primary | ICD-10-CM

## 2023-03-07 DIAGNOSIS — E11.22 TYPE 2 DIABETES MELLITUS WITH STAGE 2 CHRONIC KIDNEY DISEASE, WITHOUT LONG-TERM CURRENT USE OF INSULIN (H): ICD-10-CM

## 2023-03-07 DIAGNOSIS — N18.2 TYPE 2 DIABETES MELLITUS WITH STAGE 2 CHRONIC KIDNEY DISEASE, WITHOUT LONG-TERM CURRENT USE OF INSULIN (H): ICD-10-CM

## 2023-03-07 PROCEDURE — 99215 OFFICE O/P EST HI 40 MIN: CPT | Mod: VID | Performed by: INTERNAL MEDICINE

## 2023-03-07 RX ORDER — INSULIN GLARGINE 100 [IU]/ML
42 INJECTION, SOLUTION SUBCUTANEOUS 2 TIMES DAILY
Qty: 80 ML | Refills: 1 | Status: SHIPPED | OUTPATIENT
Start: 2023-03-07 | End: 2023-10-20

## 2023-03-07 NOTE — NURSING NOTE
Is the patient currently in the state of [place]? YES    Visit mode:VIDEO    If the visit is dropped, the patient can be reconnected by: VIDEO VISIT: Text to cell phone: [phone]    Will anyone else be joining the visit? NO      How would you like to obtain your AVS? MyChart    Are changes needed to the allergy or medication list? NO    Reason for visit: Follow up      Patient states he is having trouble with the Enrique staying on.     Tata Jerez, CMA

## 2023-03-07 NOTE — PROGRESS NOTES
"Endocrinology Clinic Visit    Chief Complaint: Follow Up     Information obtained from:Patient      Assessment/Treatment Plan:      Type 2 diabetes with other specified complications on long-term insulin treatment  Obesity Estimated body mass index is 37.26 kg/m  as calculated from the following:    Height as of 6/9/22: 1.791 m (5' 10.5\").    Weight as of 11/15/22: 119.5 kg (263 lb 6.4 oz).   Detailed blood sugar readings reviewed; postprandial hyperglycemia persistently noted.  Currently on insulin glargine 40 units twice daily, Trulicity 3 mg daily, Jardiance 10 mg daily & metformin 1000 twice daily daily and mealtime Insulin with Novolog usually sliding scale (doesn't take at lunch time usually).                      Plan    Insulin glargine 42 units twice daily     STOP Trulicity START Ozempic 1 mg every 7 days; increase to 2 mg weeky if tolerated after 4 weeks (please contact us ).     Increase Jardiance 25 mg daily    Continue with the same dose of Novolog for now.     Metformin 1000 mg twice daily.     Return to clinic with RENUKA as scheduled.     Sean Moscoso MD  Staff Endocrinologist    Division of Endocrinology and Diabetes      Subjective:         HPI: Paul Ortiz is a 53 year old male with history of type 2 diabetes who is here for routine follow up.   Diabetes diagnosed about 15 years ago.   Detailed blood sugar data not available for this visit however significant blood sugar fluctuating between low 100s-300s.  Currently on insulin glargine 38 units in the morning and 40 units at night, Trulicity 3 mg daily, metformin 1000 g twice daily and Jardiance 10 mg daily.  Novolog with meals but not usually with lunch.                       Allergies   Allergen Reactions     Zantac        Current Outpatient Medications   Medication Sig Dispense Refill     Ascorbic Acid (VITAMIN C PO)        aspirin 81 MG tablet Take 1 tablet by mouth daily. 30 tablet      atorvastatin (LIPITOR) 20 MG tablet Take 1 " tablet (20 mg) by mouth daily 90 tablet 3     blood glucose (NO BRAND SPECIFIED) test strip Use to test blood sugar 2 times daily or as directed. 200 each 4     blood glucose monitoring (ACCU-CHEK FASTCLIX) lancets TEST DAILY AND AS NEEDED 100 each 11     empagliflozin (JARDIANCE) 25 MG TABS tablet Take 1 tablet (25 mg) by mouth daily 90 tablet 1     insulin aspart (NOVOLOG PEN) 100 UNIT/ML pen Inject 12 units prior to breakfast, 16 units prior to lunch, 8 units prior to supper 15 mL 2     insulin glargine (SEMGLEE) 100 UNIT/ML pen Inject 42 Units Subcutaneous 2 times daily 80 mL 1     insulin pen needle (B-D U/F) 31G X 5 MM miscellaneous Inject Subcutaneous 4 times daily ( 3 times daily with meals and at bedtime) 400 each 3     losartan (COZAAR) 50 MG tablet TAKE 1 TABLET BY MOUTH EVERY DAY 30 tablet 0     MAGNESIUM PO        metFORMIN (GLUCOPHAGE) 1000 MG tablet Take 1 tablet (1,000 mg) by mouth 2 times daily (with meals) 180 tablet 1     Multiple Vitamins-Minerals (ZINC PO)        QUERCETIN PO        Semaglutide, 1 MG/DOSE, (OZEMPIC) 4 MG/3ML pen Inject 1 mg Subcutaneous every 7 days 3 mL 0     VITAMIN D PO        Continuous Blood Gluc  (FREESTYLE GEREMIAS READER) AMY 1 Device daily (Patient not taking: Reported on 11/15/2022) 1 each 0     Continuous Blood Gluc Sensor (FREESTYLE GEREMIAS 3 SENSOR) MISC 1 each every 14 days (Patient not taking: Reported on 3/7/2023) 6 each 3       Review of Systems     8 point review system (Constitutional, HENT, Eyes, Respiratory, Cardiovascular, Gastrointestinal, Genitourinary, Musculoskeletal,Neurological, Psychiatric/Behavioural, Endocrine) is negative or is as per HPI above  Answers for HPI/ROS submitted by the patient on 3/6/2023  General Symptoms: No  Skin Symptoms: No  HENT Symptoms: No  EYE SYMPTOMS: No  HEART SYMPTOMS: No  LUNG SYMPTOMS: No  INTESTINAL SYMPTOMS: No  URINARY SYMPTOMS: No  REPRODUCTIVE SYMPTOMS: No  SKELETAL SYMPTOMS: No  BLOOD SYMPTOMS: No  NERVOUS  SYSTEM SYMPTOMS: No  MENTAL HEALTH SYMPTOMS: No      Past Medical History:   Diagnosis Date     Diabetes type 2, uncontrolled      Gout     diet controlled     HTN, goal below 140/90      Hyperlipidemia      Obesity    Past surgical history, social and family history reviewed.      Objective:   There were no vitals taken for this visit.  Wt Readings from Last 10 Encounters:   11/15/22 119.5 kg (263 lb 6.4 oz)   10/04/22 119.7 kg (263 lb 12.8 oz)   06/09/22 121.3 kg (267 lb 6.4 oz)   02/09/22 119.3 kg (263 lb)   09/09/21 119.7 kg (264 lb)   04/14/21 118.6 kg (261 lb 6.4 oz)   04/12/21 120.4 kg (265 lb 8 oz)   01/04/21 118.6 kg (261 lb 8 oz)   07/28/20 119.9 kg (264 lb 6.4 oz)   02/03/20 117 kg (258 lb)   Constitutional: Pleasant no acute cardiopulmonary distress.   Psychological: appropriate mood and affect     In House Labs:   Lab Results   Component Value Date    A1C 10.3 10/04/2022    A1C 9.1 06/09/2022    A1C 8.1 02/09/2022    A1C 7.9 09/09/2021    A1C 7.5 04/14/2021    A1C 7.9 04/08/2021    A1C 9.1 01/04/2021    A1C 8.5 07/28/2020    A1C 6.9 08/27/2019       TSH   Date Value Ref Range Status   11/15/2022 2.20 0.40 - 4.00 mU/L Final   08/27/2019 1.76 0.40 - 4.00 mU/L Final   06/27/2017 1.51 0.40 - 4.00 mU/L Final   12/29/2015 1.78 0.40 - 4.00 mU/L Final   07/30/2013 2.20 0.4 - 5.0 mU/L Final   08/05/2010 1.35 0.4 - 5.0 mU/L Final       Creatinine   Date Value Ref Range Status   10/04/2022 1.02 0.66 - 1.25 mg/dL Final   04/14/2021 1.01 0.66 - 1.25 mg/dL Final   ]    Recent Labs   Lab Test 10/04/22  0741 02/09/22  0703 12/29/15  0751 10/24/14  0748   CHOL 133 127   < > 173   HDL 38* 43   < > 47   LDL 60 61   < > 99   TRIG 173* 114   < > 134   CHOLHDLRATIO  --   --   --  3.7    < > = values in this interval not displayed.       This note has been dictated using voice recognition software.  As a result, there may be errors in the documentation that have gone undetected.  Please consider this when interpreting  information in this documentation.      Video-Visit Details    Type of service:  Video Visit    Video Start Time: 9:04 AM    Video End Time:9:32 AM  istant Location (provider location):  Off-site.     Platform used for Video Visit: Rip van Wafels  43 minutes spent on the date of the encounter doing chart review, history and exam, documentation and further activities per the note.

## 2023-03-07 NOTE — TELEPHONE ENCOUNTER
Prior Authorization Approval    Authorization Effective Date:    Authorization Expiration Date:    Medication: Ozempic 4MG/3ML  Approved Dose/Quantity: 3 per 28 days  Reference #: [id number]   Insurance Company: [facility] - Phone [phone] Fax [phone]  Expected CoPay:       CoPay Card Available:      Foundation Assistance Needed:    Which Pharmacy is filling the prescription (Not needed for infusion/clinic administered):    Pharmacy Notified:    Patient Notified:

## 2023-03-07 NOTE — PATIENT INSTRUCTIONS
Plan  Insulin glargine 42 units twice daily   STOP Trulicity   Start Ozempic 1 mg every 7 days; after 4 weeks we will increase the dose please let us know  Increase Jardiance 25 mg daily  Continue with the same dose of Novolog and Metformin     Check BG; fasting, before meals and bedtime.   Please let us know if any BG below 70 mg/dl   We will check follow up labs at your upcoming appointment.     [facility]-Department of Endocrinology  Diabetes Educators:   Lucy Obrien RN and Cristela Raygoza RN  Clinic Nurse: STACEY Warren  CMA's: Chandni   LPN: Adilia  Scheduling/Clinic phone number : [phone]   Clinic Fax: [phone]  On-Call Endocrinology

## 2023-03-07 NOTE — LETTER
"    3/7/2023         RE: Paul Ortiz  [address] [address] [place] [id number]        Dear Colleague,    Thank you for referring your patient, Paul Ortiz, to the [facility]. Please see a copy of my visit note below.    Outcome for 03/06/23 10:14 AM: Enrique emailed to provider  Muna Vo MA    Patient is showing 3/5 MNCM met. BP out range and A1c not in range   Muna Vo MA      Endocrinology Clinic Visit    Chief Complaint: Follow Up     Information obtained from:Patient      Assessment/Treatment Plan:      Type 2 diabetes with other specified complications on long-term insulin treatment  Obesity Estimated body mass index is 37.26 kg/m  as calculated from the following:    Height as of 6/9/22: 1.791 m (5' 10.5\").    Weight as of 11/15/22: 119.5 kg (263 lb 6.4 oz).   Detailed blood sugar readings reviewed; postprandial hyperglycemia persistently noted.  Currently on insulin glargine 40 units twice daily, Trulicity 3 mg daily, Jardiance 10 mg daily & metformin 1000 twice daily daily and mealtime Insulin with Novolog usually sliding scale (doesn't take at lunch time usually).                      Plan    Insulin glargine 42 units twice daily     STOP Trulicity START Ozempic 1 mg every 7 days; increase to 2 mg weeky if tolerated after 4 weeks (please contact us ).     Increase Jardiance 25 mg daily    Continue with the same dose of Novolog for now.     Metformin 1000 mg twice daily.     Return to clinic with RENUKA as scheduled.     Sean Moscoso MD  Staff Endocrinologist    Division of Endocrinology and Diabetes      Subjective:         HPI: Paul Ortiz is a 53 year old male with history of type 2 diabetes who is here for routine follow up.   Diabetes diagnosed about 15 years ago.   Detailed blood sugar data not available for this visit however significant blood sugar fluctuating between low 100s-300s.  Currently on insulin glargine 38 units in the morning " and 40 units at night, Trulicity 3 mg daily, metformin 1000 g twice daily and Jardiance 10 mg daily.  Novolog with meals but not usually with lunch.                       Allergies   Allergen Reactions     Zantac        Current Outpatient Medications   Medication Sig Dispense Refill     Ascorbic Acid (VITAMIN C PO)        aspirin 81 MG tablet Take 1 tablet by mouth daily. 30 tablet      atorvastatin (LIPITOR) 20 MG tablet Take 1 tablet (20 mg) by mouth daily 90 tablet 3     blood glucose (NO BRAND SPECIFIED) test strip Use to test blood sugar 2 times daily or as directed. 200 each 4     blood glucose monitoring (ACCU-CHEK FASTCLIX) lancets TEST DAILY AND AS NEEDED 100 each 11     empagliflozin (JARDIANCE) 25 MG TABS tablet Take 1 tablet (25 mg) by mouth daily 90 tablet 1     insulin aspart (NOVOLOG PEN) 100 UNIT/ML pen Inject 12 units prior to breakfast, 16 units prior to lunch, 8 units prior to supper 15 mL 2     insulin glargine (SEMGLEE) 100 UNIT/ML pen Inject 42 Units Subcutaneous 2 times daily 80 mL 1     insulin pen needle (B-D U/F) 31G X 5 MM miscellaneous Inject Subcutaneous 4 times daily ( 3 times daily with meals and at bedtime) 400 each 3     losartan (COZAAR) 50 MG tablet TAKE 1 TABLET BY MOUTH EVERY DAY 30 tablet 0     MAGNESIUM PO        metFORMIN (GLUCOPHAGE) 1000 MG tablet Take 1 tablet (1,000 mg) by mouth 2 times daily (with meals) 180 tablet 1     Multiple Vitamins-Minerals (ZINC PO)        QUERCETIN PO        Semaglutide, 1 MG/DOSE, (OZEMPIC) 4 MG/3ML pen Inject 1 mg Subcutaneous every 7 days 3 mL 0     VITAMIN D PO        Continuous Blood Gluc  (FREESTYLE GEREMIAS READER) AMY 1 Device daily (Patient not taking: Reported on 11/15/2022) 1 each 0     Continuous Blood Gluc Sensor (FREESTYLE GEREMIAS 3 SENSOR) MISC 1 each every 14 days (Patient not taking: Reported on 3/7/2023) 6 each 3       Review of Systems     8 point review system (Constitutional, HENT, Eyes, Respiratory, Cardiovascular,  Gastrointestinal, Genitourinary, Musculoskeletal,Neurological, Psychiatric/Behavioural, Endocrine) is negative or is as per HPI above  Answers for HPI/ROS submitted by the patient on 3/6/2023  General Symptoms: No  Skin Symptoms: No  HENT Symptoms: No  EYE SYMPTOMS: No  HEART SYMPTOMS: No  LUNG SYMPTOMS: No  INTESTINAL SYMPTOMS: No  URINARY SYMPTOMS: No  REPRODUCTIVE SYMPTOMS: No  SKELETAL SYMPTOMS: No  BLOOD SYMPTOMS: No  NERVOUS SYSTEM SYMPTOMS: No  MENTAL HEALTH SYMPTOMS: No      Past Medical History:   Diagnosis Date     Diabetes type 2, uncontrolled      Gout     diet controlled     HTN, goal below 140/90      Hyperlipidemia      Obesity    Past surgical history, social and family history reviewed.      Objective:   There were no vitals taken for this visit.  Wt Readings from Last 10 Encounters:   11/15/22 119.5 kg (263 lb 6.4 oz)   10/04/22 119.7 kg (263 lb 12.8 oz)   06/09/22 121.3 kg (267 lb 6.4 oz)   02/09/22 119.3 kg (263 lb)   09/09/21 119.7 kg (264 lb)   04/14/21 118.6 kg (261 lb 6.4 oz)   04/12/21 120.4 kg (265 lb 8 oz)   01/04/21 118.6 kg (261 lb 8 oz)   07/28/20 119.9 kg (264 lb 6.4 oz)   02/03/20 117 kg (258 lb)   Constitutional: Pleasant no acute cardiopulmonary distress.   Psychological: appropriate mood and affect     In House Labs:   Lab Results   Component Value Date    A1C 10.3 10/04/2022    A1C 9.1 06/09/2022    A1C 8.1 02/09/2022    A1C 7.9 09/09/2021    A1C 7.5 04/14/2021    A1C 7.9 04/08/2021    A1C 9.1 01/04/2021    A1C 8.5 07/28/2020    A1C 6.9 08/27/2019       TSH   Date Value Ref Range Status   11/15/2022 2.20 0.40 - 4.00 mU/L Final   08/27/2019 1.76 0.40 - 4.00 mU/L Final   06/27/2017 1.51 0.40 - 4.00 mU/L Final   12/29/2015 1.78 0.40 - 4.00 mU/L Final   07/30/2013 2.20 0.4 - 5.0 mU/L Final   08/05/2010 1.35 0.4 - 5.0 mU/L Final       Creatinine   Date Value Ref Range Status   10/04/2022 1.02 0.66 - 1.25 mg/dL Final   04/14/2021 1.01 0.66 - 1.25 mg/dL Final   ]    Recent Labs   Lab  Test 10/04/22  0741 02/09/22  0703 12/29/15  0751 10/24/14  0748   CHOL 133 127   < > 173   HDL 38* 43   < > 47   LDL 60 61   < > 99   TRIG 173* 114   < > 134   CHOLHDLRATIO  --   --   --  3.7    < > = values in this interval not displayed.       This note has been dictated using voice recognition software.  As a result, there may be errors in the documentation that have gone undetected.  Please consider this when interpreting information in this documentation.      Video-Visit Details    Type of service:  Video Visit    Video Start Time: 9:04 AM    Video End Time:9:32 AM  istant Location (provider location):  Off-site.     Platform used for Video Visit: [employer]  43 minutes spent on the date of the encounter doing chart review, history and exam, documentation and further activities per the note.        Again, thank you for allowing me to participate in the care of your patient.        Sincerely,        Sean Moscoso MD

## 2023-03-08 ENCOUNTER — TELEPHONE (OUTPATIENT)
Dept: ENDOCRINOLOGY | Facility: CLINIC | Age: 53
End: 2023-03-08
Payer: COMMERCIAL

## 2023-03-08 NOTE — TELEPHONE ENCOUNTER
Left Voicemail (1st Attempt) for the patient to call back and schedule the following:    Appointment type: return  Provider: dr. rich   Return date: 9/7/2023  Specialty phone number: [phone]   Additonal Notes: Return in about 6 months (around 9/7/2023).    Farrah barron Procedure   Orthopedics, Podiatry, Sports Medicine, Ent ,Eye , Audiology, Adult Endocrine & Diabetes, Nutrition & Medication Therapy Management Specialties   [facility]

## 2023-03-14 NOTE — TELEPHONE ENCOUNTER
Left Voicemail (2nd Attempt) for the patient to call back and schedule the following:    Appointment type: return  Provider: dr. rich   Return date: 9/7/2023  Specialty phone number: [phone]   Additonal Notes: Return in about 6 months (around 9/7/2023).    Farrah barron Procedure   Orthopedics, Podiatry, Sports Medicine, Ent ,Eye , Audiology, Adult Endocrine & Diabetes, Nutrition & Medication Therapy Management Specialties   [facility]

## 2023-04-23 ENCOUNTER — HEALTH MAINTENANCE LETTER (OUTPATIENT)
Age: 53
End: 2023-04-23

## 2023-05-30 NOTE — PROGRESS NOTES
Outcome for 05/30/23 2:37 PM: Data obtained via [employer] website-printed  Margaret Bradford CMA  Adult Endocrinology  [facility]

## 2023-06-02 ENCOUNTER — HEALTH MAINTENANCE LETTER (OUTPATIENT)
Age: 53
End: 2023-06-02

## 2023-06-08 ENCOUNTER — OFFICE VISIT (OUTPATIENT)
Dept: ENDOCRINOLOGY | Facility: CLINIC | Age: 53
End: 2023-06-08
Payer: COMMERCIAL

## 2023-06-08 VITALS
DIASTOLIC BLOOD PRESSURE: 84 MMHG | BODY MASS INDEX: 36.47 KG/M2 | WEIGHT: 257.8 LBS | OXYGEN SATURATION: 95 % | SYSTOLIC BLOOD PRESSURE: 126 MMHG | HEART RATE: 81 BPM

## 2023-06-08 DIAGNOSIS — E11.22 TYPE 2 DIABETES MELLITUS WITH STAGE 2 CHRONIC KIDNEY DISEASE, WITHOUT LONG-TERM CURRENT USE OF INSULIN (H): Primary | ICD-10-CM

## 2023-06-08 DIAGNOSIS — N18.2 TYPE 2 DIABETES MELLITUS WITH STAGE 2 CHRONIC KIDNEY DISEASE, WITHOUT LONG-TERM CURRENT USE OF INSULIN (H): Primary | ICD-10-CM

## 2023-06-08 DIAGNOSIS — E66.01 MORBID OBESITY (H): ICD-10-CM

## 2023-06-08 LAB — HBA1C MFR BLD: 7 % (ref 4.3–?)

## 2023-06-08 PROCEDURE — 83036 HEMOGLOBIN GLYCOSYLATED A1C: CPT | Performed by: PHYSICIAN ASSISTANT

## 2023-06-08 PROCEDURE — 99213 OFFICE O/P EST LOW 20 MIN: CPT | Performed by: PHYSICIAN ASSISTANT

## 2023-06-08 RX ORDER — BLOOD-GLUCOSE SENSOR
1 EACH MISCELLANEOUS
Qty: 6 EACH | Refills: 3 | Status: SHIPPED | OUTPATIENT
Start: 2023-06-08 | End: 2024-04-26

## 2023-06-08 ASSESSMENT — PAIN SCALES - GENERAL: PAINLEVEL: MILD PAIN (3)

## 2023-06-08 NOTE — LETTER
6/8/2023         RE: Paul Ortiz  [address] [address] [id number]        Dear Colleague,    Thank you for referring your patient, Paul Ortiz, to the [facility]. Please see a copy of my visit note below.    Outcome for 05/30/23 2:37 PM: Data obtained via [employer] website-printed  Margaret Bradford CMA  Adult Endocrinology  [facility]         Assessment/Plan :   1. Type 2 DM. Paul is doing much better. His hemoglobin A1C is down to 7%. He is still struggling with lunch time Novolog but, otherwise, he is keeping a much tighter control over his blood sugars. He has even had a few morning low blood sugars. We discussed options and we are going to try increasing the Ozempic to 2 mg weekly. I am hopeful that this will help with weight loss and may decrease his need for insulin, even further. A new prescription was sent in to his pharmacy. We will plan on a follow-up appt in 3-4 mos.      I have independently reviewed and interpreted labs, imaging as indicated.      Chief complaint:  Paul is a 53 year old male who returns for follow-up of Type 2 DM.    I have reviewed Care Everywhere including [facility], [facility], [facility], [facility] , [facility], [place] lab reports, imaging reports and provider notes as indicated.      HISTORY OF PRESENT ILLNESS  Paul continues to work on managing his diabetes. He was diagnosed with diabetes about 15 yrs ago. He has struggled to get consistent control over his blood sugars. He feels like stress and work really contribute to his ongoing struggles with hyperglycemia. He is currently taking 42 unit(s) of Lantus twice daily, along with Novolog before meals, metformin 2000 mg daily, Jardiance 25 mg daily, and Ozempic 1 mg weekly. He uses the FreeStyle Enrique 3 to monitor his blood sugars.    His numbers have been improving from his previous appt. He typically wakes up with blood sugars within target  range. He takes 12 unit(s) of Novolog with breakfast and he feels like his numbers are stable until the afternoon. He is very busy at work and he often eats lunch on the go. He usually does not have time to take the 16 unit(s) of Novolog, as prescribed, until after he has already eaten. This usually leads to afternoon hyperglycemia. He is good about taking 8 unit(s) of Novolog with dinner and he feels like his blood sugars are typically stable before bed.    He has not had any problems with severe hyperglycemia and/or hypoglycemia. He has had a few blood sugars into the 50s over night. He does wake up when he is experiencing a low blood sugar. He will have a small snack and his number returns to normal range. He also denies any vision changes or problems with numbness/tingling in his feet.    Endocrine relevant labs are as follows:   Latest Reference Range & Units 06/08/23 08:17   Hemoglobin A1C POCT 4.3 - <5.7 % 7.0 !   !: Data is abnormal    REVIEW OF SYSTEMS  Answers for HPI/ROS submitted by the patient on 6/8/2023  General Symptoms: No  Skin Symptoms: No  HENT Symptoms: No  EYE SYMPTOMS: No  HEART SYMPTOMS: No  LUNG SYMPTOMS: No  INTESTINAL SYMPTOMS: No  URINARY SYMPTOMS: No  REPRODUCTIVE SYMPTOMS: No  SKELETAL SYMPTOMS: No  BLOOD SYMPTOMS: No  NERVOUS SYSTEM SYMPTOMS: No  MENTAL HEALTH SYMPTOMS: No      Endocrine: positive for diabetes and obesity  Skin: negative  Eyes: negative for, visual blurring  Ears/Nose/Throat: negative  Respiratory: No shortness of breath, dyspnea on exertion, cough, or hemoptysis  Cardiovascular: negative for, chest pain, lower extremity edema and exercise intolerance  Gastrointestinal: negative for, nausea, vomiting, constipation and diarrhea  Genitourinary: negative for, nocturia, dysuria, frequency and urgency  Musculoskeletal: negative for, muscular weakness and nocturnal cramping  Neurologic: negative for, numbness or tingling of feet and tremor  Psychiatric:  negative  Hematologic/Lymphatic/Immunologic: negative    Past Medical History  Past Medical History:   Diagnosis Date     Diabetes type 2, uncontrolled      Gout     diet controlled     HTN, goal below 140/90      Hyperlipidemia      Obesity        Medications  Current Outpatient Medications   Medication Sig Dispense Refill     Ascorbic Acid (VITAMIN C PO)        aspirin 81 MG tablet Take 1 tablet by mouth daily. 30 tablet      atorvastatin (LIPITOR) 20 MG tablet Take 1 tablet (20 mg) by mouth daily 90 tablet 3     empagliflozin (JARDIANCE) 25 MG TABS tablet Take 1 tablet (25 mg) by mouth daily 90 tablet 1     insulin aspart (NOVOLOG FLEXPEN) 100 UNIT/ML pen INJECT 12 UNITS PRIOR TO BREAKFAST, 16 UNITS PRIOR TO LUNCH, 8 UNITS PRIOR TO SUPPER 15 mL 0     insulin glargine (SEMGLEE) 100 UNIT/ML pen Inject 42 Units Subcutaneous 2 times daily 80 mL 1     insulin pen needle (B-D U/F) 31G X 5 MM miscellaneous Inject Subcutaneous 4 times daily ( 3 times daily with meals and at bedtime) 400 each 3     losartan (COZAAR) 50 MG tablet TAKE 1 TABLET BY MOUTH EVERY DAY 30 tablet 0     MAGNESIUM PO        metFORMIN (GLUCOPHAGE) 1000 MG tablet Take 1 tablet (1,000 mg) by mouth 2 times daily (with meals) 180 tablet 1     Multiple Vitamins-Minerals (ZINC PO)        QUERCETIN PO        Semaglutide, 1 MG/DOSE, (OZEMPIC) 4 MG/3ML pen Inject 1 mg Subcutaneous every 7 days 3 mL 3     VITAMIN D PO        blood glucose (NO BRAND SPECIFIED) test strip Use to test blood sugar 2 times daily or as directed. 200 each 4     blood glucose monitoring (ACCU-CHEK FASTCLIX) lancets TEST DAILY AND AS NEEDED 100 each 11     Continuous Blood Gluc  (FREESTYLE GEREMIAS READER) AMY 1 Device daily (Patient not taking: Reported on 11/15/2022) 1 each 0     Continuous Blood Gluc Sensor (FREESTYLE GEREMIAS 3 SENSOR) MISC 1 each every 14 days (Patient not taking: Reported on 3/7/2023) 6 each 3       Allergies  Allergies   Allergen Reactions     Zantac           Family History  family history includes C.A.D. in his maternal grandfather; Diabetes in his father and maternal grandmother; Hypertension in his mother.    Social History  Social History     Tobacco Use     Smoking status: Former     Packs/day: 1.50     Years: 13.00     Pack years: 19.50     Types: Cigarettes     Quit date: 1997     Years since quittin.8     Smokeless tobacco: Never   Vaping Use     Vaping status: Never Used   Substance Use Topics     Alcohol use: Yes     Comment: very little     Drug use: No       Physical Exam  /84 (BP Location: Left arm, Patient Position: Sitting, Cuff Size: Adult Regular)   Pulse 81   Wt 116.9 kg (257 lb 12.8 oz)   SpO2 95%   BMI 36.47 kg/m    Body mass index is 36.47 kg/m .  GENERAL :  In no apparent distress  SKIN: Normal color, normal temperature, texture.  No hirsutism, alopecia or purple striae.     EYES: PERRL, EOMI, No scleral icterus,  No proptosis, conjunctival redness, stare, retraction  MOUTH: Moist, pink; pharynx clear  NECK: No visible masses. No palpable adenopathy, or masses. No carotid bruits.   RESP: Lungs clear to auscultation bilaterally  CARDIAC: Regular rate and rhythm, normal S1 S2, without murmurs, rubs or gallops    NEURO: awake, alert, responds appropriately to questions.  Cranial nerves intact.   Moves all extremities; Gait normal.  No tremor of the outstretched hand.  EXTREMITIES: No clubbing, cyanosis or edema.    DATA REVIEW  FreeStyle LibreView  Time in target range 70%  High 24% and very high 6%  Current BG ave 165 mg/dl        Again, thank you for allowing me to participate in the care of your patient.        Sincerely,        Mendy Dorsey PA-C

## 2023-06-08 NOTE — PROGRESS NOTES
Assessment/Plan :   1. Type 2 DM. Paul is doing much better. His hemoglobin A1C is down to 7%. He is still struggling with lunch time Novolog but, otherwise, he is keeping a much tighter control over his blood sugars. He has even had a few morning low blood sugars. We discussed options and we are going to try increasing the Ozempic to 2 mg weekly. I am hopeful that this will help with weight loss and may decrease his need for insulin, even further. A new prescription was sent in to his pharmacy. We will plan on a follow-up appt in 3-4 mos.      I have independently reviewed and interpreted labs, imaging as indicated.      Chief complaint:  Paul is a 53 year old male who returns for follow-up of Type 2 DM.    I have reviewed Care Everywhere including [facility], [facility], [facility], [facility], [facility] , [facility], [place] lab reports, imaging reports and provider notes as indicated.      HISTORY OF PRESENT ILLNESS  Paul continues to work on managing his diabetes. He was diagnosed with diabetes about 15 yrs ago. He has struggled to get consistent control over his blood sugars. He feels like stress and work really contribute to his ongoing struggles with hyperglycemia. He is currently taking 42 unit(s) of Lantus twice daily, along with Novolog before meals, metformin 2000 mg daily, Jardiance 25 mg daily, and Ozempic 1 mg weekly. He uses the FreeStyle Enrique 3 to monitor his blood sugars.    His numbers have been improving from his previous appt. He typically wakes up with blood sugars within target range. He takes 12 unit(s) of Novolog with breakfast and he feels like his numbers are stable until the afternoon. He is very busy at work and he often eats lunch on the go. He usually does not have time to take the 16 unit(s) of Novolog, as prescribed, until after he has already eaten. This usually leads to afternoon hyperglycemia. He is good about taking 8 unit(s) of Novolog with dinner and  he feels like his blood sugars are typically stable before bed.    He has not had any problems with severe hyperglycemia and/or hypoglycemia. He has had a few blood sugars into the 50s over night. He does wake up when he is experiencing a low blood sugar. He will have a small snack and his number returns to normal range. He also denies any vision changes or problems with numbness/tingling in his feet.    Endocrine relevant labs are as follows:   Latest Reference Range & Units 06/08/23 08:17   Hemoglobin A1C POCT 4.3 - <5.7 % 7.0 !   !: Data is abnormal    REVIEW OF SYSTEMS  Answers for HPI/ROS submitted by the patient on 6/8/2023  General Symptoms: No  Skin Symptoms: No  HENT Symptoms: No  EYE SYMPTOMS: No  HEART SYMPTOMS: No  LUNG SYMPTOMS: No  INTESTINAL SYMPTOMS: No  URINARY SYMPTOMS: No  REPRODUCTIVE SYMPTOMS: No  SKELETAL SYMPTOMS: No  BLOOD SYMPTOMS: No  NERVOUS SYSTEM SYMPTOMS: No  MENTAL HEALTH SYMPTOMS: No      Endocrine: positive for diabetes and obesity  Skin: negative  Eyes: negative for, visual blurring  Ears/Nose/Throat: negative  Respiratory: No shortness of breath, dyspnea on exertion, cough, or hemoptysis  Cardiovascular: negative for, chest pain, lower extremity edema and exercise intolerance  Gastrointestinal: negative for, nausea, vomiting, constipation and diarrhea  Genitourinary: negative for, nocturia, dysuria, frequency and urgency  Musculoskeletal: negative for, muscular weakness and nocturnal cramping  Neurologic: negative for, numbness or tingling of feet and tremor  Psychiatric: negative  Hematologic/Lymphatic/Immunologic: negative    Past Medical History  Past Medical History:   Diagnosis Date     Diabetes type 2, uncontrolled      Gout     diet controlled     HTN, goal below 140/90      Hyperlipidemia      Obesity        Medications  Current Outpatient Medications   Medication Sig Dispense Refill     Ascorbic Acid (VITAMIN C PO)        aspirin 81 MG tablet Take 1 tablet by mouth daily.  30 tablet      atorvastatin (LIPITOR) 20 MG tablet Take 1 tablet (20 mg) by mouth daily 90 tablet 3     empagliflozin (JARDIANCE) 25 MG TABS tablet Take 1 tablet (25 mg) by mouth daily 90 tablet 1     insulin aspart (NOVOLOG FLEXPEN) 100 UNIT/ML pen INJECT 12 UNITS PRIOR TO BREAKFAST, 16 UNITS PRIOR TO LUNCH, 8 UNITS PRIOR TO SUPPER 15 mL 0     insulin glargine (SEMGLEE) 100 UNIT/ML pen Inject 42 Units Subcutaneous 2 times daily 80 mL 1     insulin pen needle (B-D U/F) 31G X 5 MM miscellaneous Inject Subcutaneous 4 times daily ( 3 times daily with meals and at bedtime) 400 each 3     losartan (COZAAR) 50 MG tablet TAKE 1 TABLET BY MOUTH EVERY DAY 30 tablet 0     MAGNESIUM PO        metFORMIN (GLUCOPHAGE) 1000 MG tablet Take 1 tablet (1,000 mg) by mouth 2 times daily (with meals) 180 tablet 1     Multiple Vitamins-Minerals (ZINC PO)        QUERCETIN PO        Semaglutide, 1 MG/DOSE, (OZEMPIC) 4 MG/3ML pen Inject 1 mg Subcutaneous every 7 days 3 mL 3     VITAMIN D PO        blood glucose (NO BRAND SPECIFIED) test strip Use to test blood sugar 2 times daily or as directed. 200 each 4     blood glucose monitoring (ACCU-CHEK FASTCLIX) lancets TEST DAILY AND AS NEEDED 100 each 11     Continuous Blood Gluc  (FREESTYLE GEREMIAS READER) AYM 1 Device daily (Patient not taking: Reported on 11/15/2022) 1 each 0     Continuous Blood Gluc Sensor (FREESTYLE GEREMIAS 3 SENSOR) MISC 1 each every 14 days (Patient not taking: Reported on 3/7/2023) 6 each 3       Allergies  Allergies   Allergen Reactions     Zantac          Family History  family history includes C.A.D. in his maternal grandfather; Diabetes in his father and maternal grandmother; Hypertension in his mother.    Social History  Social History     Tobacco Use     Smoking status: Former     Packs/day: 1.50     Years: 13.00     Pack years: 19.50     Types: Cigarettes     Quit date: 1997     Years since quittin.8     Smokeless tobacco: Never   Vaping Use      Vaping status: Never Used   Substance Use Topics     Alcohol use: Yes     Comment: very little     Drug use: No       Physical Exam  /84 (BP Location: Left arm, Patient Position: Sitting, Cuff Size: Adult Regular)   Pulse 81   Wt 116.9 kg (257 lb 12.8 oz)   SpO2 95%   BMI 36.47 kg/m    Body mass index is 36.47 kg/m .  GENERAL :  In no apparent distress  SKIN: Normal color, normal temperature, texture.  No hirsutism, alopecia or purple striae.     EYES: PERRL, EOMI, No scleral icterus,  No proptosis, conjunctival redness, stare, retraction  MOUTH: Moist, pink; pharynx clear  NECK: No visible masses. No palpable adenopathy, or masses. No carotid bruits.   RESP: Lungs clear to auscultation bilaterally  CARDIAC: Regular rate and rhythm, normal S1 S2, without murmurs, rubs or gallops    NEURO: awake, alert, responds appropriately to questions.  Cranial nerves intact.   Moves all extremities; Gait normal.  No tremor of the outstretched hand.  EXTREMITIES: No clubbing, cyanosis or edema.    DATA REVIEW  FreeStyle LibreView  Time in target range 70%  High 24% and very high 6%  Current BG ave 165 mg/dl

## 2023-06-08 NOTE — NURSING NOTE
Paul Ortiz's goals for this visit include: NONE  He requests these members of his care team be copied on today's visit information: YES    PCP: Dominik Thomas    Referring Provider:  No referring provider defined for this encounter.    /84 (BP Location: Left arm, Patient Position: Sitting, Cuff Size: Adult Regular)   Pulse 81   Wt 116.9 kg (257 lb 12.8 oz)   SpO2 95%   BMI 36.47 kg/m      Do you need any medication refills at today's visit?     YES    Parmjit Urrutia, EMT

## 2023-06-24 DIAGNOSIS — I10 BENIGN ESSENTIAL HYPERTENSION: ICD-10-CM

## 2023-06-26 RX ORDER — LOSARTAN POTASSIUM 50 MG/1
TABLET ORAL
Qty: 30 TABLET | Refills: 0 | Status: SHIPPED | OUTPATIENT
Start: 2023-06-26 | End: 2023-07-14

## 2023-08-08 ENCOUNTER — OFFICE VISIT (OUTPATIENT)
Dept: FAMILY MEDICINE | Facility: CLINIC | Age: 53
End: 2023-08-08
Payer: COMMERCIAL

## 2023-08-08 VITALS
WEIGHT: 253 LBS | OXYGEN SATURATION: 98 % | SYSTOLIC BLOOD PRESSURE: 130 MMHG | HEIGHT: 71 IN | TEMPERATURE: 97.4 F | DIASTOLIC BLOOD PRESSURE: 87 MMHG | BODY MASS INDEX: 35.42 KG/M2 | RESPIRATION RATE: 20 BRPM | HEART RATE: 70 BPM

## 2023-08-08 DIAGNOSIS — N18.2 TYPE 2 DIABETES MELLITUS WITH STAGE 2 CHRONIC KIDNEY DISEASE, WITHOUT LONG-TERM CURRENT USE OF INSULIN (H): ICD-10-CM

## 2023-08-08 DIAGNOSIS — E66.01 MORBID OBESITY (H): ICD-10-CM

## 2023-08-08 DIAGNOSIS — M75.91 SUPRASPINATUS TENDINITIS, RIGHT: ICD-10-CM

## 2023-08-08 DIAGNOSIS — E11.22 TYPE 2 DIABETES MELLITUS WITH STAGE 2 CHRONIC KIDNEY DISEASE, WITHOUT LONG-TERM CURRENT USE OF INSULIN (H): ICD-10-CM

## 2023-08-08 DIAGNOSIS — M75.41 IMPINGEMENT SYNDROME OF SHOULDER REGION, RIGHT: Primary | ICD-10-CM

## 2023-08-08 DIAGNOSIS — I10 BENIGN ESSENTIAL HYPERTENSION: ICD-10-CM

## 2023-08-08 DIAGNOSIS — E78.5 HYPERLIPIDEMIA LDL GOAL <100: ICD-10-CM

## 2023-08-08 PROCEDURE — 20610 DRAIN/INJ JOINT/BURSA W/O US: CPT | Performed by: PHYSICIAN ASSISTANT

## 2023-08-08 PROCEDURE — 99207 PR FOOT EXAM NO CHARGE: CPT | Performed by: PHYSICIAN ASSISTANT

## 2023-08-08 PROCEDURE — 99214 OFFICE O/P EST MOD 30 MIN: CPT | Mod: 25 | Performed by: PHYSICIAN ASSISTANT

## 2023-08-08 RX ORDER — ATORVASTATIN CALCIUM 20 MG/1
20 TABLET, FILM COATED ORAL DAILY
Qty: 90 TABLET | Refills: 3 | Status: SHIPPED | OUTPATIENT
Start: 2023-08-08 | End: 2024-06-13

## 2023-08-08 RX ORDER — METHYLPREDNISOLONE ACETATE 40 MG/ML
40 INJECTION, SUSPENSION INTRA-ARTICULAR; INTRALESIONAL; INTRAMUSCULAR; SOFT TISSUE ONCE
Status: COMPLETED | OUTPATIENT
Start: 2023-08-08 | End: 2023-08-09

## 2023-08-08 RX ORDER — LOSARTAN POTASSIUM 100 MG/1
100 TABLET ORAL DAILY
Qty: 90 TABLET | Refills: 1 | Status: SHIPPED | OUTPATIENT
Start: 2023-08-08 | End: 2024-02-05

## 2023-08-08 ASSESSMENT — PAIN SCALES - GENERAL: PAINLEVEL: NO PAIN (1)

## 2023-08-08 NOTE — PROGRESS NOTES
"    Jazz Miller is a 53 year old, presenting for the following health issues:  Diabetes (recheck) and Health Maintenance (Patient declined pneumonia vaccine)        8/8/2023     7:19 AM   Additional Questions   Roomed by Nerissa Gabriel CMA   Accompanied by None         8/8/2023     7:19 AM   Patient Reported Additional Medications   Patient reports taking the following new medications none       History of Present Illness       Diabetes:   He presents for follow up of diabetes.   He is checking home blood glucose with a continuous glucose monitor.   He checks blood glucose before meals, after meals, before and after meals and at bedtime.  Blood glucose is sometimes over 200 and sometimes under 70. He is aware of hypoglycemia symptoms including shakiness.    He has no concerns regarding his diabetes at this time.   He is not experiencing numbness or burning in feet, excessive thirst, blurry vision, weight changes or redness, sores or blisters on feet.           He eats 0-1 servings of fruits and vegetables daily.He consumes 0 sweetened beverage(s) daily.He exercises with enough effort to increase his heart rate 9 or less minutes per day.  He exercises with enough effort to increase his heart rate 3 or less days per week.   He is taking medications regularly.     Recheck of dm  A1c 7 in June.    Taking and tolerating meds.    Recheck of blood pressure    Recheck of obesity. Discussed a lower calorie diet and consistent mix of aerobic exercise and strength training. This will help maintain/treat his blood pressure.      Review of Systems   Constitutional, HEENT, cardiovascular, pulmonary, GI, , musculoskeletal, neuro, skin, endocrine and psych systems are negative, except as otherwise noted.        Objective    /87   Pulse 70   Temp 97.4  F (36.3  C) (Temporal)   Resp 20   Ht 1.791 m (5' 10.5\")   Wt 114.8 kg (253 lb)   SpO2 98%   BMI 35.79 kg/m    Body mass index is 35.79 kg/m .  Physical Exam "     Eye exam - right eye normal lid, conjunctiva, cornea, pupil and fundus, left eye normal lid, conjunctiva, cornea, pupil and fundus..  Thyroid not palpable, not enlarged, no nodules detected.  CHEST:chest clear to IPPA, no tachypnea, retractions or cyanosis, and S1, S2 normal, no murmur, no gallop, rate regular.  Foot exam - both sides normal; no swelling, tenderness or skin or vascular lesions. Color and temperature is normal. Sensation is intact. Peripheral pulses are palpable. Toenails are normal.  Shoulder exam shows positive impingement signs are present with pain at high arc of abduction and forward flexion on right. There is tenderness of the lateral shoulder.  The risks, benefits and potential complications (including but not limited to, bleeding, infection, pain, scar, damage to adjacent structures, atrophy or necrosis of soft tissue, skin blanching, failure to relieve symptoms) of injection were discussed with the patient. Questions were addressed and answered.The patient elected to proceed. Written informed consent was obtained. The correct procedural site was identified and confirmed. A Right shoulder intraarticular injection was performed using 1mL Depo Medrol 40mg per mL and 2mL (0.25% marcaine) of local anesthetic after sterile prep, to the correct procedural site. Sterile bandaid applied. This was tolerated well by the patient. No apparent complications. Did also discuss that if diabetic, recommend close monitoring of blood sugars over the next week as cortisone injections can temporarily elevate blood sugars.      Paul was seen today for diabetes and health maintenance.    Diagnoses and all orders for this visit:    Impingement syndrome of shoulder region, right  -     DRAIN/INJECT LARGE JOINT/BURSA  -     methylPREDNISolone (DEPO-MEDROL) injection 40 mg  -     Physical Therapy Referral; Future    Type 2 diabetes mellitus with stage 2 chronic kidney disease, without long-term current use of  insulin (H)  -     FOOT EXAM  -     empagliflozin (JARDIANCE) 25 MG TABS tablet; Take 1 tablet (25 mg) by mouth daily  -     metFORMIN (GLUCOPHAGE) 1000 MG tablet; Take 1 tablet (1,000 mg) by mouth 2 times daily (with meals)    Morbid obesity (H)    Hyperlipidemia LDL goal <100  -     atorvastatin (LIPITOR) 20 MG tablet; Take 1 tablet (20 mg) by mouth daily    Benign essential hypertension  -     losartan (COZAAR) 100 MG tablet; Take 1 tablet (100 mg) by mouth daily    Supraspinatus tendinitis, right  -     DRAIN/INJECT LARGE JOINT/BURSA  -     methylPREDNISolone (DEPO-MEDROL) injection 40 mg  -     Physical Therapy Referral; Future      work on lifestyle modification  Advised supportive and symptomatic treatment.  Follow up with Provider - if condition persists or worsens.     .

## 2023-08-09 RX ADMIN — METHYLPREDNISOLONE ACETATE 40 MG: 40 INJECTION, SUSPENSION INTRA-ARTICULAR; INTRALESIONAL; INTRAMUSCULAR; SOFT TISSUE at 13:07

## 2023-08-30 ASSESSMENT — ACTIVITIES OF DAILY LIVING (ADL)
PLACING_AN_OBJECT_ON_A_HIGH_SHELF: 6
PUTTING_ON_A_SHIRT_THAT_BUTTONS_DOWN_THE_FRONT: 0
PUTTING_ON_AN_UNDERSHIRT_OR_A_PULLOVER_SWEATER: 5
REACHING_FOR_SOMETHING_ON_A_HIGH_SHELF: 7
PUTTING_ON_YOUR_PANTS: 0
REMOVING_SOMETHING_FROM_YOUR_BACK_POCKET: 1
PLEASE_INDICATE_YOR_PRIMARY_REASON_FOR_REFERRAL_TO_THERAPY:: SHOULDER
AT_ITS_WORST?: 6
CARRYING_A_HEAVY_OBJECT_OF_10_POUNDS: 0
WHEN_LYING_ON_THE_INVOLVED_SIDE: 3
PUSHING_WITH_THE_INVOLVED_ARM: 3
WASHING_YOUR_HAIR?: 3
TOUCHING_THE_BACK_OF_YOUR_NECK: 0
WASHING_YOUR_BACK: 8

## 2023-08-31 ENCOUNTER — THERAPY VISIT (OUTPATIENT)
Dept: PHYSICAL THERAPY | Facility: CLINIC | Age: 53
End: 2023-08-31
Attending: PHYSICIAN ASSISTANT
Payer: COMMERCIAL

## 2023-08-31 DIAGNOSIS — M75.41 IMPINGEMENT SYNDROME OF SHOULDER REGION, RIGHT: ICD-10-CM

## 2023-08-31 DIAGNOSIS — M75.91 SUPRASPINATUS TENDINITIS, RIGHT: ICD-10-CM

## 2023-08-31 PROCEDURE — 97161 PT EVAL LOW COMPLEX 20 MIN: CPT | Mod: GP

## 2023-08-31 NOTE — PROGRESS NOTES
PHYSICAL THERAPY EVALUATION  Type of Visit: Evaluation    See electronic medical record for Abuse and Falls Screening details.    Subjective       Presenting condition or subjective complaint: Shoulder pain  Date of onset: 08/31/22    Relevant medical history: Diabetes; Overweight; Pain at night or rest   Dates & types of surgery: 2021 gall bladder    Prior diagnostic imaging/testing results:       Prior therapy history for the same diagnosis, illness or injury: No      Prior Level of Function  Transfers: PHYSICAL THERAPY EVALUATION  Type of Visit: Evaluation    See electronic medical record for Abuse and Falls Screening details.    Subjective       Presenting condition or subjective complaint: Shoulder pain  Date of onset: 08/31/22    Relevant medical history: Diabetes; Overweight; Pain at night or rest   Dates & types of surgery: 2021 gall bladder  Shoulder pain for about a year now. Helping mother move out of her house, throw some garbage into a dumpster and that's when he felt the pain. Box weighted about 50-60 pounds. Reaching up overhead and out in front of him he feels it. Doing anything out in front of him and to the side. Can't sleep on right side like he normally does. Had recent cortisone injection in Right supraspinatus muscle but it has largely been ineffective in reducing symptoms.   Prior diagnostic imaging/testing results:       Prior therapy history for the same diagnosis, illness or injury: No      Prior Level of Function  Transfers:   Ambulation:   ADL:   IADL:     Living Environment  Social support: With a significant other or spouse   Type of home: House   Stairs to enter the home: No       Ramp: No   Stairs inside the home: Yes 7 Is there a railing: Yes   Help at home:    Equipment owned:       Employment: Yes Manager  Hobbies/Interests: Model trains    Patient goals for therapy: Sleep    Pain assessment:      Objective   SHOULDER EVALUATION  PAIN: Pain Level at Rest: 0/10  Pain Level with Use:  7/10  Pain Location: shoulder  Pain Quality: Burning  Pain Frequency: pain only with use  Pain is Worst: sleeping at night is difficult  Pain is Exacerbated By: overhead movement, moving arm in front of the body, lifting heavy objects, sleeping on R shoulder  Pain is Relieved By: none  Pain Progression: Unchanged  INTEGUMENTARY (edema, incisions):   POSTURE:   GAIT:   Weightbearing Status:   Assistive Device(s):   Gait Deviations:   BALANCE/PROPRIOCEPTION:   WEIGHTBEARING ALIGNMENT:   ROM:  L shoulder ROM WNL, R shoulder flexion: 147 deg w/ pain, abduction: 132 deg with pain, Extension WNL. Pt demonstrates good scapular retraction in standing with some pain noted in R shoulder    STRENGTH:  All shoulder strength RAUL 5/5 except R shoulder flexion 4/5 with pain, R shoulder abduction 5/5 with pain  FLEXIBILITY:   SPECIAL TESTS:  Neer = +; Lei-Dionte = -; Empty can = +; Drop arm test = +  PALPATION:   JOINT MOBILITY:   CERVICAL SCREEN:     Assessment & Plan   CLINICAL IMPRESSIONS  Medical Diagnosis: Impingement syndrome of right shoulder region, Supraspinatus tendinitis, right    Treatment Diagnosis: R shoulder pain, decreased ROM, decreased muscle strength   Impression/Assessment: Patient is a 53 year old male with right shoulder pain complaints.  The following significant findings have been identified: Pain, Decreased ROM/flexibility, Decreased strength, Impaired muscle performance, and Decreased activity tolerance. These impairments interfere with their ability to perform recreational activities as compared to previous level of function.     Clinical Decision Making (Complexity):  Clinical Presentation: Stable/Uncomplicated  Clinical Presentation Rationale: based on medical and personal factors listed in PT evaluation  Clinical Decision Making (Complexity): Low complexity    PLAN OF CARE  Treatment Interventions:  Modalities: Cryotherapy, Hot Pack  Interventions: Manual Therapy, Therapeutic Exercise    Long Term  Goals     PT Goal 1  Goal Identifier: LTG 1  Goal Description: Patient will be able to sleep on right shoulder without pain  Rationale: to maximize safety and independence with performance of ADLs and functional tasks  Goal Progress: patient reports he is unable to sleep on right shoulder  Target Date: 10/26/23      Frequency of Treatment: 1x per week  Duration of Treatment: 8 weeks    Recommended Referrals to Other Professionals:   Education Assessment:   Learner/Method: Patient;No Barriers to Learning    Risks and benefits of evaluation/treatment have been explained.   Patient/Family/caregiver agrees with Plan of Care.     Evaluation Time:     PT Eval, Low Complexity Minutes (79058): 15       Signing Clinician: Agustín Wong PT

## 2023-09-14 ENCOUNTER — THERAPY VISIT (OUTPATIENT)
Dept: PHYSICAL THERAPY | Facility: CLINIC | Age: 53
End: 2023-09-14
Attending: PHYSICIAN ASSISTANT
Payer: COMMERCIAL

## 2023-09-14 DIAGNOSIS — M25.511 RIGHT SHOULDER PAIN: Primary | ICD-10-CM

## 2023-09-14 PROCEDURE — 97110 THERAPEUTIC EXERCISES: CPT | Mod: GP

## 2023-09-14 PROCEDURE — 97140 MANUAL THERAPY 1/> REGIONS: CPT | Mod: GP

## 2023-09-14 PROCEDURE — 97530 THERAPEUTIC ACTIVITIES: CPT | Mod: GP

## 2023-09-28 ENCOUNTER — THERAPY VISIT (OUTPATIENT)
Dept: PHYSICAL THERAPY | Facility: CLINIC | Age: 53
End: 2023-09-28
Payer: COMMERCIAL

## 2023-09-28 DIAGNOSIS — M25.511 RIGHT SHOULDER PAIN: Primary | ICD-10-CM

## 2023-09-28 PROCEDURE — 97140 MANUAL THERAPY 1/> REGIONS: CPT | Mod: GP

## 2023-09-28 PROCEDURE — 97110 THERAPEUTIC EXERCISES: CPT | Mod: GP

## 2023-10-05 ENCOUNTER — THERAPY VISIT (OUTPATIENT)
Dept: PHYSICAL THERAPY | Facility: CLINIC | Age: 53
End: 2023-10-05
Payer: COMMERCIAL

## 2023-10-05 DIAGNOSIS — M25.511 RIGHT SHOULDER PAIN: Primary | ICD-10-CM

## 2023-10-05 PROCEDURE — 97110 THERAPEUTIC EXERCISES: CPT | Mod: GP

## 2023-10-17 ENCOUNTER — THERAPY VISIT (OUTPATIENT)
Dept: PHYSICAL THERAPY | Facility: CLINIC | Age: 53
End: 2023-10-17
Payer: COMMERCIAL

## 2023-10-17 DIAGNOSIS — M25.511 RIGHT SHOULDER PAIN: Primary | ICD-10-CM

## 2023-10-17 PROCEDURE — 97140 MANUAL THERAPY 1/> REGIONS: CPT | Mod: GP

## 2023-10-17 PROCEDURE — 97110 THERAPEUTIC EXERCISES: CPT | Mod: GP

## 2023-10-17 NOTE — PROGRESS NOTES
Outcome for 10/17/23 3:20 PM: Data obtained via [employer] website  Margaret Bradford CMA  Adult Endocrinology   [facility]

## 2023-10-20 ENCOUNTER — OFFICE VISIT (OUTPATIENT)
Dept: ENDOCRINOLOGY | Facility: CLINIC | Age: 53
End: 2023-10-20
Payer: COMMERCIAL

## 2023-10-20 VITALS
HEART RATE: 78 BPM | BODY MASS INDEX: 35.51 KG/M2 | WEIGHT: 251 LBS | DIASTOLIC BLOOD PRESSURE: 84 MMHG | OXYGEN SATURATION: 96 % | SYSTOLIC BLOOD PRESSURE: 116 MMHG

## 2023-10-20 DIAGNOSIS — N18.2 TYPE 2 DIABETES MELLITUS WITH STAGE 2 CHRONIC KIDNEY DISEASE, WITHOUT LONG-TERM CURRENT USE OF INSULIN (H): ICD-10-CM

## 2023-10-20 DIAGNOSIS — E11.22 TYPE 2 DIABETES MELLITUS WITH STAGE 2 CHRONIC KIDNEY DISEASE, WITHOUT LONG-TERM CURRENT USE OF INSULIN (H): ICD-10-CM

## 2023-10-20 DIAGNOSIS — E66.01 CLASS 2 SEVERE OBESITY DUE TO EXCESS CALORIES WITH SERIOUS COMORBIDITY AND BODY MASS INDEX (BMI) OF 35.0 TO 35.9 IN ADULT (H): Primary | ICD-10-CM

## 2023-10-20 DIAGNOSIS — E66.812 CLASS 2 SEVERE OBESITY DUE TO EXCESS CALORIES WITH SERIOUS COMORBIDITY AND BODY MASS INDEX (BMI) OF 35.0 TO 35.9 IN ADULT (H): Primary | ICD-10-CM

## 2023-10-20 LAB
ANION GAP SERPL CALCULATED.3IONS-SCNC: 12 MMOL/L (ref 7–15)
BUN SERPL-MCNC: 17.7 MG/DL (ref 6–20)
CALCIUM SERPL-MCNC: 10 MG/DL (ref 8.6–10)
CHLORIDE SERPL-SCNC: 104 MMOL/L (ref 98–107)
CREAT SERPL-MCNC: 1.12 MG/DL (ref 0.67–1.17)
CREAT UR-MCNC: 164 MG/DL
DEPRECATED HCO3 PLAS-SCNC: 26 MMOL/L (ref 22–29)
EGFRCR SERPLBLD CKD-EPI 2021: 78 ML/MIN/1.73M2
GLUCOSE SERPL-MCNC: 82 MG/DL (ref 70–99)
HBA1C MFR BLD: 6.8 % (ref 4.3–?)
MICROALBUMIN UR-MCNC: <12 MG/L
MICROALBUMIN/CREAT UR: NORMAL MG/G{CREAT}
POTASSIUM SERPL-SCNC: 5 MMOL/L (ref 3.4–5.3)
SODIUM SERPL-SCNC: 142 MMOL/L (ref 135–145)

## 2023-10-20 PROCEDURE — 80048 BASIC METABOLIC PNL TOTAL CA: CPT | Performed by: PHYSICIAN ASSISTANT

## 2023-10-20 PROCEDURE — 36415 COLL VENOUS BLD VENIPUNCTURE: CPT | Performed by: PHYSICIAN ASSISTANT

## 2023-10-20 PROCEDURE — 82043 UR ALBUMIN QUANTITATIVE: CPT | Performed by: PHYSICIAN ASSISTANT

## 2023-10-20 PROCEDURE — 99213 OFFICE O/P EST LOW 20 MIN: CPT | Performed by: PHYSICIAN ASSISTANT

## 2023-10-20 PROCEDURE — 82570 ASSAY OF URINE CREATININE: CPT | Performed by: PHYSICIAN ASSISTANT

## 2023-10-20 PROCEDURE — 83036 HEMOGLOBIN GLYCOSYLATED A1C: CPT | Performed by: PHYSICIAN ASSISTANT

## 2023-10-20 RX ORDER — INSULIN GLARGINE 100 [IU]/ML
42 INJECTION, SOLUTION SUBCUTANEOUS 2 TIMES DAILY
Qty: 80 ML | Refills: 1 | Status: SHIPPED | OUTPATIENT
Start: 2023-10-20 | End: 2024-04-26

## 2023-10-20 RX ORDER — FLURBIPROFEN SODIUM 0.3 MG/ML
SOLUTION/ DROPS OPHTHALMIC 4 TIMES DAILY
Qty: 400 EACH | Refills: 3 | Status: SHIPPED | OUTPATIENT
Start: 2023-10-20 | End: 2024-04-26

## 2023-10-20 RX ORDER — INSULIN ASPART 100 [IU]/ML
INJECTION, SOLUTION INTRAVENOUS; SUBCUTANEOUS
Qty: 30 ML | Refills: 3 | Status: SHIPPED | OUTPATIENT
Start: 2023-10-20 | End: 2024-06-13

## 2023-10-20 NOTE — PATIENT INSTRUCTIONS
[facility]-Department of Endocrinology  Diabetes Educators:   Lynn Alcaraz, RN and Cristela Raygoza RN  Clinic Nurse: STACEY Warren  CMA's: Margaret Butt and Elfego   EMT: Parmjit  Scheduling/Clinic phone number : [phone]   Clinic Fax: [phone]  On-Call Endocrine at the [place] (after hours/weekends): [phone] option 4    Please call the number below to schedule your labs.  [facility] [phone]   [facility] [phone]   [place] [phone]   [facility]  [phone]   [facility] [phone]   [place] [phone]   [facility]) [phone]   [facility] Walk-In Only   [place] [phone]   [place] [phone]   [place] [phone]   [place] [phone]     Please reach out to the following centers to schedule your imaging appointment:  Imaging (DEXA, CT, MRI, XRAY)    [facility] ([facility] [place]) [phone]   [facility] ([place], [place]) [phone]   [facility] ([facility]) [phone]   [facility] ([facility]) [phone]     Appointment Reminders:  * Please bring meter with for staff to download  * If you are due ONLY for an A1C, it is scheduled with the nurse and will be done in clinic. You do not need to schedule a lab appointment. Fasting is not required for an A1C.  * Refill request should be submitted to your pharmacy. They will contact clinic for approval.

## 2023-10-20 NOTE — NURSING NOTE
Paul Ortiz's goals for this visit include:   Chief Complaint   Patient presents with    Follow Up    Diabetes     He requests these members of his care team be copied on today's visit information: Yes    PCP: Dominik Thomas    Referring Provider:  No referring provider defined for this encounter.    /84 (BP Location: Left arm, Patient Position: Sitting, Cuff Size: Adult Large)   Pulse 78   Wt 113.9 kg (251 lb)   SpO2 96%   BMI 35.51 kg/m      Do you need any medication refills at today's visit? Yes     Margaret Bradford, DALE  Adult Endocrinology   [facility]

## 2023-10-20 NOTE — PROGRESS NOTES
Assessment/Plan :   Type 2 DM. Paul is doing great. He feels very stable on his current medications and he has been very happy with his blood sugars. We reviewed the possible adverse effects of Ozempic and he has not had any problems. He does state that the frequency of his bowel movements have changed. He states that they are different but not in an adverse way. We will continue with his current medications. I also encouraged him to continue to cut back on his insulin, as needed. We will follow-up in 6 mos.      I have independently reviewed and interpreted labs, imaging as indicated.      Chief complaint:  Paul is a 53 year old male who returns for follow-up of Type 2 DM.    I have reviewed Care Everywhere including [facility], [facility], [facility], [facility], [facility] , [facility], [place] lab reports, imaging reports and provider notes as indicated.      HISTORY OF PRESENT ILLNESS  Paul is doing great. He is stable on his current medications and his blood sugars have been very stable. He feels like his current medications are really working. He currently takes Ozempic 2 mg weekly, metformin 1000 mg twice daily, Semglee 40 unit(s) twice daily, and Novolog 8-12 unit(s) before meals. However, he has found that since starting Ozempic, there are days when he needs almost no Novolog. He has also had a few episodes of low blood sugars in the morning. He continues to monitor his blood sugars with the FreeStyle Enrique.    Paul has had diabetes for about 15 yrs. He has struggled to get his blood sugars under control. He attributes a lot of the problem to stress. He is still under a lot of stress but he feels like the Ozempic has really made a significant impact on his ability to control his food cravings and his blood sugars. His diabetes is complicated by obesity, hyperlipidemia, HTN, and shoulder impingement. He has not had any problems with blurred vision or worsening numbness/tingling in  his feet. He also has no history of microalbuminuria.    Endocrine relevant labs are as follows:   Latest Reference Range & Units 10/04/22 07:41   Albumin Urine mg/g Cr 0.00 - 17.00 mg/g Cr 12.86      Latest Reference Range & Units 10/04/22 07:41   Albumin Urine mg/L mg/L 27      Latest Reference Range & Units 10/20/23 08:10   Hemoglobin A1C POCT 4.3 - <5.7 % 6.8 !   !: Data is abnormal    REVIEW OF SYSTEMS    Endocrine: positive for diabetes and obesity  Skin: negative  Eyes: negative for, visual blurring, redness, tearing, itching  Ears/Nose/Throat: negative  Respiratory: No shortness of breath, dyspnea on exertion, cough, or hemoptysis  Cardiovascular: negative for, chest pain, dyspnea on exertion, lower extremity edema, and exercise intolerance  Gastrointestinal: positive for change in BM, negative for, nausea, vomiting, constipation, and diarrhea  Genitourinary: negative for, nocturia, dysuria, frequency, and urgency  Musculoskeletal: negative for, muscular weakness, nocturnal cramping, and foot pain  Neurologic: negative for, local weakness, and numbness or tingling of feet  Psychiatric: negative  Hematologic/Lymphatic/Immunologic: negative    Past Medical History  Past Medical History:   Diagnosis Date    Diabetes type 2, uncontrolled     Gout     diet controlled    HTN, goal below 140/90     Hyperlipidemia     Obesity        Medications  Current Outpatient Medications   Medication Sig Dispense Refill    Ascorbic Acid (VITAMIN C PO)       aspirin 81 MG tablet Take 1 tablet by mouth daily. 30 tablet     atorvastatin (LIPITOR) 20 MG tablet Take 1 tablet (20 mg) by mouth daily 90 tablet 3    Continuous Blood Gluc Sensor (FREESTYLE GEREMIAS 3 SENSOR) MISC 1 each every 14 days 6 each 3    empagliflozin (JARDIANCE) 25 MG TABS tablet Take 1 tablet (25 mg) by mouth daily 90 tablet 1    insulin aspart (NOVOLOG FLEXPEN) 100 UNIT/ML pen INJECT 12 UNITS PRIOR TO BREAKFAST, 16 UNITS PRIOR TO LUNCH, 8 UNITS PRIOR TO SUPPER 30  mL 3    insulin glargine (SEMGLEE) 100 UNIT/ML pen Inject 42 Units Subcutaneous 2 times daily 80 mL 1    insulin pen needle (B-D U/F) 31G X 5 MM miscellaneous Inject Subcutaneous 4 times daily ( 3 times daily with meals and at bedtime) 400 each 3    losartan (COZAAR) 100 MG tablet Take 1 tablet (100 mg) by mouth daily 90 tablet 1    MAGNESIUM PO       metFORMIN (GLUCOPHAGE) 1000 MG tablet Take 1 tablet (1,000 mg) by mouth 2 times daily (with meals) 180 tablet 1    Multiple Vitamins-Minerals (ZINC PO)       QUERCETIN PO       Semaglutide, 2 MG/DOSE, (OZEMPIC) 8 MG/3ML pen Inject 2 mg Subcutaneous every 7 days 9 mL 3    VITAMIN D PO       losartan (COZAAR) 50 MG tablet TAKE 1 TABLET BY MOUTH EVERY DAY 30 tablet 0       Allergies  Allergies   Allergen Reactions    Zantac          Family History  family history includes C.A.D. in his maternal grandfather; Diabetes in his father and maternal grandmother; Hypertension in his mother.    Social History  Social History     Tobacco Use    Smoking status: Former     Packs/day: 1.50     Years: 13.00     Additional pack years: 0.00     Total pack years: 19.50     Types: Cigarettes     Quit date: 1997     Years since quittin.2    Smokeless tobacco: Never   Vaping Use    Vaping Use: Never used   Substance Use Topics    Alcohol use: Yes     Comment: very little    Drug use: No       Physical Exam  /84 (BP Location: Left arm, Patient Position: Sitting, Cuff Size: Adult Large)   Pulse 78   Wt 113.9 kg (251 lb)   SpO2 96%   BMI 35.51 kg/m    Body mass index is 35.51 kg/m .  GENERAL :  In no apparent distress  SKIN: Normal color, normal temperature, texture.  No hirsutism, alopecia or purple striae.     EYES: PERRL, EOMI, No scleral icterus,  No proptosis, conjunctival redness, stare, retraction  RESP: Lungs clear to auscultation bilaterally  CARDIAC: Regular rate and rhythm, normal S1 S2, without murmurs, rubs or gallops    NEURO: awake, alert, responds  appropriately to questions.  Cranial nerves intact.   Moves all extremities; Gait normal.  No tremor of the outstretched hand.    EXTREMITIES: No clubbing, cyanosis or edema.    DATA REVIEW  FreeStyle LibreView  Time in target range 84%  Low 1%, High 14%, Very High 1%  Current Ave  mg/dl

## 2023-10-20 NOTE — LETTER
10/20/2023         RE: Paul Ortiz  [id number] [facility] [id number]        Dear Colleague,    Thank you for referring your patient, Paul Ortiz, to the [facility]. Please see a copy of my visit note below.    Outcome for 10/17/23 3:20 PM: Data obtained via [employer] website  Margaret Bradford CMA  Adult Endocrinology   [facility]      Assessment/Plan :   Type 2 DM. Paul is doing great. He feels very stable on his current medications and he has been very happy with his blood sugars. We reviewed the possible adverse effects of Ozempic and he has not had any problems. He does state that the frequency of his bowel movements have changed. He states that they are different but not in an adverse way. We will continue with his current medications. I also encouraged him to continue to cut back on his insulin, as needed. We will follow-up in 6 mos.      I have independently reviewed and interpreted labs, imaging as indicated.      Chief complaint:  Paul is a 53 year old male who returns for follow-up of Type 2 DM.    I have reviewed Care Everywhere including [facility], [facility], [facility], [place], [employer] [facility], [facility] , [facility], [place] lab reports, imaging reports and provider notes as indicated.      HISTORY OF PRESENT ILLNESS  Paul is doing great. He is stable on his current medications and his blood sugars have been very stable. He feels like his current medications are really working. He currently takes Ozempic 2 mg weekly, metformin 1000 mg twice daily, Semglee 40 unit(s) twice daily, and Novolog 8-12 unit(s) before meals. However, he has found that since starting Ozempic, there are days when he needs almost no Novolog. He has also had a few episodes of low blood sugars in the morning. He continues to monitor his blood sugars with the FreeStyle Enrique.    Paul has had diabetes for about 15 yrs. He has struggled to get his blood  sugars under control. He attributes a lot of the problem to stress. He is still under a lot of stress but he feels like the Ozempic has really made a significant impact on his ability to control his food cravings and his blood sugars. His diabetes is complicated by obesity, hyperlipidemia, HTN, and shoulder impingement. He has not had any problems with blurred vision or worsening numbness/tingling in his feet. He also has no history of microalbuminuria.    Endocrine relevant labs are as follows:   Latest Reference Range & Units 10/04/22 07:41   Albumin Urine mg/g Cr 0.00 - 17.00 mg/g Cr 12.86      Latest Reference Range & Units 10/04/22 07:41   Albumin Urine mg/L mg/L 27      Latest Reference Range & Units 10/20/23 08:10   Hemoglobin A1C POCT 4.3 - <5.7 % 6.8 !   !: Data is abnormal    REVIEW OF SYSTEMS    Endocrine: positive for diabetes and obesity  Skin: negative  Eyes: negative for, visual blurring, redness, tearing, itching  Ears/Nose/Throat: negative  Respiratory: No shortness of breath, dyspnea on exertion, cough, or hemoptysis  Cardiovascular: negative for, chest pain, dyspnea on exertion, lower extremity edema, and exercise intolerance  Gastrointestinal: positive for change in BM, negative for, nausea, vomiting, constipation, and diarrhea  Genitourinary: negative for, nocturia, dysuria, frequency, and urgency  Musculoskeletal: negative for, muscular weakness, nocturnal cramping, and foot pain  Neurologic: negative for, local weakness, and numbness or tingling of feet  Psychiatric: negative  Hematologic/Lymphatic/Immunologic: negative    Past Medical History  Past Medical History:   Diagnosis Date     Diabetes type 2, uncontrolled      Gout     diet controlled     HTN, goal below 140/90      Hyperlipidemia      Obesity        Medications  Current Outpatient Medications   Medication Sig Dispense Refill     Ascorbic Acid (VITAMIN C PO)        aspirin 81 MG tablet Take 1 tablet by mouth daily. 30 tablet       atorvastatin (LIPITOR) 20 MG tablet Take 1 tablet (20 mg) by mouth daily 90 tablet 3     Continuous Blood Gluc Sensor (FREESTYLE GEREMIAS 3 SENSOR) MISC 1 each every 14 days 6 each 3     empagliflozin (JARDIANCE) 25 MG TABS tablet Take 1 tablet (25 mg) by mouth daily 90 tablet 1     insulin aspart (NOVOLOG FLEXPEN) 100 UNIT/ML pen INJECT 12 UNITS PRIOR TO BREAKFAST, 16 UNITS PRIOR TO LUNCH, 8 UNITS PRIOR TO SUPPER 30 mL 3     insulin glargine (SEMGLEE) 100 UNIT/ML pen Inject 42 Units Subcutaneous 2 times daily 80 mL 1     insulin pen needle (B-D U/F) 31G X 5 MM miscellaneous Inject Subcutaneous 4 times daily ( 3 times daily with meals and at bedtime) 400 each 3     losartan (COZAAR) 100 MG tablet Take 1 tablet (100 mg) by mouth daily 90 tablet 1     MAGNESIUM PO        metFORMIN (GLUCOPHAGE) 1000 MG tablet Take 1 tablet (1,000 mg) by mouth 2 times daily (with meals) 180 tablet 1     Multiple Vitamins-Minerals (ZINC PO)        QUERCETIN PO        Semaglutide, 2 MG/DOSE, (OZEMPIC) 8 MG/3ML pen Inject 2 mg Subcutaneous every 7 days 9 mL 3     VITAMIN D PO        losartan (COZAAR) 50 MG tablet TAKE 1 TABLET BY MOUTH EVERY DAY 30 tablet 0       Allergies  Allergies   Allergen Reactions     Zantac          Family History  family history includes C.A.D. in his maternal grandfather; Diabetes in his father and maternal grandmother; Hypertension in his mother.    Social History  Social History     Tobacco Use     Smoking status: Former     Packs/day: 1.50     Years: 13.00     Additional pack years: 0.00     Total pack years: 19.50     Types: Cigarettes     Quit date: 1997     Years since quittin.2     Smokeless tobacco: Never   Vaping Use     Vaping Use: Never used   Substance Use Topics     Alcohol use: Yes     Comment: very little     Drug use: No       Physical Exam  /84 (BP Location: Left arm, Patient Position: Sitting, Cuff Size: Adult Large)   Pulse 78   Wt 113.9 kg (251 lb)   SpO2 96%   BMI 35.51 kg/m     Body mass index is 35.51 kg/m .  GENERAL :  In no apparent distress  SKIN: Normal color, normal temperature, texture.  No hirsutism, alopecia or purple striae.     EYES: PERRL, EOMI, No scleral icterus,  No proptosis, conjunctival redness, stare, retraction  RESP: Lungs clear to auscultation bilaterally  CARDIAC: Regular rate and rhythm, normal S1 S2, without murmurs, rubs or gallops    NEURO: awake, alert, responds appropriately to questions.  Cranial nerves intact.   Moves all extremities; Gait normal.  No tremor of the outstretched hand.    EXTREMITIES: No clubbing, cyanosis or edema.    DATA REVIEW  FreeStyle LibreView  Time in target range 84%  Low 1%, High 14%, Very High 1%  Current Ave  mg/dl        Again, thank you for allowing me to participate in the care of your patient.        Sincerely,        Mendy Dorsey PA-C

## 2023-12-26 ENCOUNTER — THERAPY VISIT (OUTPATIENT)
Dept: PHYSICAL THERAPY | Facility: CLINIC | Age: 53
End: 2023-12-26
Payer: COMMERCIAL

## 2023-12-26 DIAGNOSIS — G89.29 CHRONIC RIGHT SHOULDER PAIN: Primary | ICD-10-CM

## 2023-12-26 DIAGNOSIS — M25.511 CHRONIC RIGHT SHOULDER PAIN: Primary | ICD-10-CM

## 2023-12-26 PROCEDURE — 97110 THERAPEUTIC EXERCISES: CPT | Mod: GP

## 2023-12-26 NOTE — Clinical Note
Hi!   I just saw Paul after about 2 months off of PT, and he's had some improvements but is still stiff and is presenting as late-stage adhesive capsulitis. I told him to make an appointment with you to talk about possible next steps but he will also be continuing with PT. Just wanted to give you a heads up.   Thank you and let me know if you have any thoughts/questions!  Nicole

## 2023-12-26 NOTE — PROGRESS NOTES
"PROGRESS  REPORT    Progress reporting period is from 8/31/23 to 12/26/23.       SUBJECTIVE  Subjective changes noted by patient:  \"Pt reports his shoulder really doesnt have pain anymore, just stiffness and restrictions in motions. Still struggles to get hand behind back or raise arm above 90degrees. Pt states R shoulder locks up if he gets in certain positions, feels like he has to manipulate it to move again . Pt still doing HEP a few times a week, doesnt get sore but feels like they loosen shoulder up. 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 \".       Current pain level is 6/10  at worst.     Previous pain level was  9/10  at worst.   Changes in function:  Yes, improved functional reaching and lifting, however still stiff and restricted  Adverse reaction to treatment or activity: None    OBJECTIVE  Changes noted in objective findings:  Yes, improved AROM, however firm end feels before full ROM noted    PROM limited to 90/110 in abd/scaption, 20 ER, and max loss of IR  AROM improved since initial few visits of PT, however still limited and restricted        ASSESSMENT/PLAN  Updated problem list and treatment plan: Diagnosis 1:  R shoulder pain   Pain -  manual therapy, education, and home program  Decreased ROM/flexibility - manual therapy, therapeutic exercise, therapeutic activity, and home program  Decreased joint mobility - manual therapy, therapeutic exercise, therapeutic activity, and home program  STG/LTGs have been met or progress has been made towards goals:  Yes, improving  Assessment of Progress: The patient's condition has potential to improve.  The patient's progress has plateaued.  Self Management Plans:  Patient has been instructed in a home treatment program.  Patient  has been instructed in self management of symptoms.  I have re-evaluated this patient and find that the nature, scope, duration and intensity of the therapy is appropriate for the medical condition of the " patient.  Paul continues to require the following intervention to meet STG and LTG's:  PT      Limitations consistent with late stage frozen shoulder.   --> Instructed pt to check back in with referring provider to assess possible next steps for treatment.        Recommendations:  This patient would benefit from continued therapy.     Frequency:  1-2 X a month, once daily  Duration:  for 3 months    Please refer to the daily flowsheet for treatment today, total treatment time and time spent performing 1:1 timed codes.

## 2024-01-10 ENCOUNTER — TELEPHONE (OUTPATIENT)
Dept: ENDOCRINOLOGY | Facility: CLINIC | Age: 54
End: 2024-01-10
Payer: COMMERCIAL

## 2024-01-10 NOTE — TELEPHONE ENCOUNTER
Prior Authorization Retail Medication Request, patient requesting    Medication/Dose: Ozempic    New/renewal/insurance change PA/secondary ins. PA:    Filomena Quezada CMA  Adult Endocrinology  [facility] [place]

## 2024-01-10 NOTE — TELEPHONE ENCOUNTER
Prior Authorization Approval    Medication: OZEMPIC (2 MG/DOSE) 8 MG/3ML SC SOPN  Authorization Effective Date: 12/11/2023  Authorization Expiration Date: 1/9/2025  Approved Dose/Quantity: 3ml per 28 days  Reference #: [id number]   Insurance Company: [employer] - Phone [phone] Fax [phone]  Expected CoPay: $ 90  CoPay Card Available: No    Financial Assistance Needed:   Which Pharmacy is filling the prescription:    Pharmacy Notified:   Patient Notified:

## 2024-01-10 NOTE — TELEPHONE ENCOUNTER
Patient informed via [employer].    Filomena Quezada CMA  Adult Endocrinology  [facility] [place]

## 2024-02-03 DIAGNOSIS — I10 BENIGN ESSENTIAL HYPERTENSION: ICD-10-CM

## 2024-02-03 DIAGNOSIS — E11.22 TYPE 2 DIABETES MELLITUS WITH STAGE 2 CHRONIC KIDNEY DISEASE, WITHOUT LONG-TERM CURRENT USE OF INSULIN (H): ICD-10-CM

## 2024-02-03 DIAGNOSIS — N18.2 TYPE 2 DIABETES MELLITUS WITH STAGE 2 CHRONIC KIDNEY DISEASE, WITHOUT LONG-TERM CURRENT USE OF INSULIN (H): ICD-10-CM

## 2024-02-05 RX ORDER — LOSARTAN POTASSIUM 100 MG/1
100 TABLET ORAL DAILY
Qty: 90 TABLET | Refills: 0 | Status: SHIPPED | OUTPATIENT
Start: 2024-02-05 | End: 2024-05-17

## 2024-03-10 DIAGNOSIS — N18.2 TYPE 2 DIABETES MELLITUS WITH STAGE 2 CHRONIC KIDNEY DISEASE, WITHOUT LONG-TERM CURRENT USE OF INSULIN (H): ICD-10-CM

## 2024-03-10 DIAGNOSIS — E11.22 TYPE 2 DIABETES MELLITUS WITH STAGE 2 CHRONIC KIDNEY DISEASE, WITHOUT LONG-TERM CURRENT USE OF INSULIN (H): ICD-10-CM

## 2024-03-11 RX ORDER — EMPAGLIFLOZIN 25 MG/1
25 TABLET, FILM COATED ORAL DAILY
Qty: 30 TABLET | Refills: 0 | Status: SHIPPED | OUTPATIENT
Start: 2024-03-11 | End: 2024-04-17

## 2024-04-21 ENCOUNTER — HEALTH MAINTENANCE LETTER (OUTPATIENT)
Age: 54
End: 2024-04-21

## 2024-04-26 ENCOUNTER — OFFICE VISIT (OUTPATIENT)
Dept: ENDOCRINOLOGY | Facility: CLINIC | Age: 54
End: 2024-04-26
Payer: COMMERCIAL

## 2024-04-26 VITALS
SYSTOLIC BLOOD PRESSURE: 124 MMHG | WEIGHT: 250 LBS | BODY MASS INDEX: 35.36 KG/M2 | HEART RATE: 76 BPM | DIASTOLIC BLOOD PRESSURE: 86 MMHG | RESPIRATION RATE: 18 BRPM | OXYGEN SATURATION: 98 %

## 2024-04-26 DIAGNOSIS — E11.22 TYPE 2 DIABETES MELLITUS WITH STAGE 2 CHRONIC KIDNEY DISEASE, WITHOUT LONG-TERM CURRENT USE OF INSULIN (H): ICD-10-CM

## 2024-04-26 DIAGNOSIS — E66.812 CLASS 2 SEVERE OBESITY DUE TO EXCESS CALORIES WITH SERIOUS COMORBIDITY AND BODY MASS INDEX (BMI) OF 35.0 TO 35.9 IN ADULT (H): Primary | ICD-10-CM

## 2024-04-26 DIAGNOSIS — N18.2 TYPE 2 DIABETES MELLITUS WITH STAGE 2 CHRONIC KIDNEY DISEASE, WITHOUT LONG-TERM CURRENT USE OF INSULIN (H): ICD-10-CM

## 2024-04-26 DIAGNOSIS — E66.01 CLASS 2 SEVERE OBESITY DUE TO EXCESS CALORIES WITH SERIOUS COMORBIDITY AND BODY MASS INDEX (BMI) OF 35.0 TO 35.9 IN ADULT (H): Primary | ICD-10-CM

## 2024-04-26 LAB — HBA1C MFR BLD: 6.7 %

## 2024-04-26 PROCEDURE — 83036 HEMOGLOBIN GLYCOSYLATED A1C: CPT | Performed by: PHYSICIAN ASSISTANT

## 2024-04-26 PROCEDURE — 99213 OFFICE O/P EST LOW 20 MIN: CPT | Performed by: PHYSICIAN ASSISTANT

## 2024-04-26 RX ORDER — INSULIN LISPRO 100 [IU]/ML
INJECTION, SOLUTION INTRAVENOUS; SUBCUTANEOUS
Status: CANCELLED | OUTPATIENT
Start: 2024-04-26

## 2024-04-26 RX ORDER — INSULIN GLARGINE 100 [IU]/ML
42 INJECTION, SOLUTION SUBCUTANEOUS 2 TIMES DAILY
Qty: 75 ML | Refills: 1 | Status: SHIPPED | OUTPATIENT
Start: 2024-04-26 | End: 2024-06-13

## 2024-04-26 RX ORDER — FLURBIPROFEN SODIUM 0.3 MG/ML
SOLUTION/ DROPS OPHTHALMIC 4 TIMES DAILY
Qty: 400 EACH | Refills: 3 | Status: SHIPPED | OUTPATIENT
Start: 2024-04-26

## 2024-04-26 RX ORDER — INSULIN LISPRO 100 [IU]/ML
INJECTION, SOLUTION INTRAVENOUS; SUBCUTANEOUS
Qty: 30 ML | Refills: 1 | Status: SHIPPED | OUTPATIENT
Start: 2024-04-26

## 2024-04-26 RX ORDER — BLOOD-GLUCOSE SENSOR
1 EACH MISCELLANEOUS
Qty: 6 EACH | Refills: 3 | Status: SHIPPED | OUTPATIENT
Start: 2024-04-26

## 2024-04-26 NOTE — LETTER
4/26/2024         RE: Paul Ortiz  [id number] [facility] [id number]        Dear Colleague,    Thank you for referring your patient, Paul Ortiz, to the [facility]. Please see a copy of my visit note below.                  Assessment/Plan :   Type 2 DM. Paul is doing great. We reviewed his recent CGMS report and his blood sugars are stable. He has the occasional post-prandial spike and he states that he continues to work on making healthy dietary decisions. He also plans on increasing exercise throughout the summer. I do not see any reason to make medication adjustments today. I encouraged him to keep up the good work and we will follow-up in 4-5 mos.       I have independently reviewed and interpreted labs, imaging as indicated.      Chief complaint:  Paul is a 54 year old male who returns for follow-up of Type 2 DM.    I have reviewed Care Everywhere including [facility], [facility], [facility], [facility], [facility], [facility] , [facility], [place] lab reports, imaging reports and provider notes as indicated.      HISTORY OF PRESENT ILLNESS  Paul continues to work on managing his blood sugars. He was surprised that his A1C was not higher. He has been under a lot of stress over the last few months and he has noticed an increase in his overall blood sugars due to the stress. He has continued to take metformin 2000 mg daily, Jardiance 25 mg daily, Ozempic 2 mg weekly, insulin glargine 42 unit(s) twice daily and Novolog before meals based on a sliding scale. He continues to monitor his blood sugars closely with the FreeStyle Enrique 3 sensor.    Paul has not had any problems with severe hyperglycemia and/or hypoglycemia. However, he has noticed more glucose fluctuation, especially during stressful days. He has not had any issues with blurred vision or worsening numbness/tingling in his feet. Since the weather is getting nicer, he has also been out  walking more and he and his wife plan on taking up [employer] ball.    Paul was diagnosed with diabetes about 15 yrs ago. During that time he has struggled to get his blood sugars under consistent control. He attributes a lot of the problem to stress. He is still under a lot of stress but he feels like the Ozempic has made a significant improvement in stabilizing his numbers. He has not needed as much Novolog, since starting Ozempic, and his blood sugars remain under good control. His diabetes is complicated by obesity, hyperlipidemia, HTN, and shoulder impingement.     Endocrine relevant labs are as follows:   Latest Reference Range & Units 04/26/24 08:27   Afinion Hemoglobin A1c POCT <=5.7 % 6.7 (H)   (H): Data is abnormally high   Latest Reference Range & Units 10/20/23 08:47   Albumin Urine mg/L mg/L <12.0      Latest Reference Range & Units 10/20/23 08:10   Hemoglobin A1C POCT 4.3 - <5.7 % 6.8 !   !: Data is abnormal    REVIEW OF SYSTEMS    Endocrine: positive for diabetes and obesity  Skin: negative  Eyes: negative for, visual blurring, redness, tearing  Ears/Nose/Throat: negative  Respiratory: No shortness of breath, dyspnea on exertion, cough, or hemoptysis  Cardiovascular: negative for, chest pain, dyspnea on exertion, lower extremity edema, and exercise intolerance  Gastrointestinal: negative for, nausea, vomiting, constipation, and diarrhea  Genitourinary: negative for, nocturia, dysuria, frequency, and urgency  Musculoskeletal: negative for, muscular weakness, nocturnal cramping, and foot pain  Neurologic: negative for, local weakness, numbness or tingling of hands, and numbness or tingling of feet  Psychiatric: negative  Hematologic/Lymphatic/Immunologic: negative    Past Medical History  Past Medical History:   Diagnosis Date     Diabetes type 2, uncontrolled      Gout     diet controlled     HTN, goal below 140/90      Hyperlipidemia      Obesity        Medications  Current Outpatient Medications    Medication Sig Dispense Refill     Ascorbic Acid (VITAMIN C PO)        aspirin 81 MG tablet Take 1 tablet by mouth daily. 30 tablet      atorvastatin (LIPITOR) 20 MG tablet Take 1 tablet (20 mg) by mouth daily 90 tablet 3     Continuous Glucose Sensor (FREESTYLE GEREMIAS 3 SENSOR) MISC 1 each every 14 days 6 each 3     insulin aspart (NOVOLOG FLEXPEN) 100 UNIT/ML pen INJECT 12 UNITS PRIOR TO BREAKFAST, 16 UNITS PRIOR TO LUNCH, 8 UNITS PRIOR TO SUPPER 30 mL 3     insulin glargine (SEMGLEE) 100 UNIT/ML pen Inject 42 Units Subcutaneous 2 times daily 80 mL 1     insulin lispro (HUMALOG KWIKPEN) 100 UNIT/ML (1 unit dial) KWIKPEN Take before meals based on a sliding scale. TDD 20 unit(s) 30 mL 1     insulin pen needle (B-D U/F) 31G X 5 MM miscellaneous Inject Subcutaneous 4 times daily ( 3 times daily with meals and at bedtime) 400 each 3     JARDIANCE 25 MG TABS tablet TAKE 1 TABLET BY MOUTH EVERY DAY 30 tablet 0     losartan (COZAAR) 100 MG tablet TAKE 1 TABLET BY MOUTH EVERY DAY 90 tablet 0     MAGNESIUM PO        metFORMIN (GLUCOPHAGE) 1000 MG tablet TAKE 1 TABLET BY MOUTH TWICE A DAY WITH MEALS 180 tablet 0     Multiple Vitamins-Minerals (ZINC PO)        QUERCETIN PO        Semaglutide, 2 MG/DOSE, (OZEMPIC) 8 MG/3ML pen Inject 2 mg Subcutaneous every 7 days 9 mL 3     VITAMIN D PO          Allergies  Allergies   Allergen Reactions     Zantac          Family History  family history includes C.A.D. in his maternal grandfather; Diabetes in his father and maternal grandmother; Hypertension in his mother.    Social History  Social History     Tobacco Use     Smoking status: Former     Current packs/day: 0.00     Average packs/day: 1.5 packs/day for 13.0 years (19.5 ttl pk-yrs)     Types: Cigarettes     Start date: 1984     Quit date: 1997     Years since quittin.7     Smokeless tobacco: Never   Vaping Use     Vaping status: Never Used   Substance Use Topics     Alcohol use: Yes     Comment: very little      Drug use: No       Physical Exam  /86 (BP Location: Left arm, Patient Position: Sitting, Cuff Size: Adult Large)   Pulse 76   Resp 18   Wt 113.4 kg (250 lb)   SpO2 98%   BMI 35.36 kg/m    Body mass index is 35.36 kg/m .  GENERAL :  In no apparent distress  SKIN: Normal color, normal temperature, texture.  No hirsutism, alopecia or purple striae.     EYES: PERRL, EOMI, No scleral icterus,  No proptosis, conjunctival redness, stare, retraction  RESP: Lungs clear to auscultation bilaterally  CARDIAC: Regular rate and rhythm, normal S1 S2, without murmurs, rubs or gallops      NEURO: awake, alert, responds appropriately to questions.  Cranial nerves intact.   Moves all extremities; Gait normal.  No tremor of the outstretched hand.    EXTREMITIES: No clubbing, cyanosis or edema.    DATA REVIEW  FreeStyle LibreView  Time in target range 74%  High 25% and Very High 1%  Current Ave  mg/dl        Again, thank you for allowing me to participate in the care of your patient.        Sincerely,        Mendy Dorsey PA-C

## 2024-04-26 NOTE — PATIENT INSTRUCTIONS
Welcome to the [facility] Endocrinology and Diabetes Clinics     Our Endocrinology Clinics are here to provide you with a team-based, collaborative approach in the diagnosis and treatment of patients with diabetes and endocrine disorders. The team is made up of Physicians, Physician Assistants, Certified Diabetes Educators, Registered Nurses, Medical Assistants, Emergency Medical Technicians, and many others, all of whom have the unified goal of providing our patients with high quality care.     Please see below for some helpful tips to best navigate and use the [facility] Endocrinology clinic:     [place] Respect: At [facility], we are committed to a respectful and safe space for all patients, visitors, and staff.  We believe that mutual respect between patients and their care team is the foundation of quality care.  It is our expectation that you will be treated with respect by your care team.  In turn, we ask that all communication with the care team (written and verbal) be respectful and free from profanity, threatening, or abusive language.  Disrespectful communication undermines our therapeutic relationship with you and may result in us being unable to continue to provide your care.    Refills: A provider must see you at least annually to prescribe and refill medications. This is to ensure your safety as well as meet insurance and compliance regulations.    Scheduling: Many of our Providers offer both in-person or video visits. Please call to schedule any needed follow ups as soon as possible because our provider schedules fill up very quickly. Our care team has the right to require an in-person visit when they believe that it is medically necessary. Please remember that for any virtual visits, you must be in the [facility] at the time of the visit, otherwise we are unable to see you and you will need to be rescheduled.    Missed Appointments: If you need to cancel or miss your  scheduled appointment, please call the clinic at [phone] to reschedule.  Please note if you repeatedly miss appointments or repeatedly miss appointments without calling to inform us ahead of time (no-show), the clinic may elect to not allow you to reschedule without speaking to a manager, may require a Partnership In Care Agreement prior to rescheduling, or could result in you no longer being able to receive care from the clinic. Providing the clinic with timely notification if you have to miss an appointment, allows us to better serve the needs of all of our patients.    Primary Care Provider: Our Endocrinologists are Specialists in their field. We expect you to have a Primary Care Provider established to handle any needs outside of your diabetes and endocrine care.  We would be happy to assist you find a Primary Care Provider, if you do not have one.    SimpliVity: [employer] is a wonderful resource that allows you access to your Care Team via online or the sravan. Please ask a member of the team if you would like help creating an account. Please note that it may take up to 2 business days for a response. [employer] messages are not reviewed on weekends or after business hours.  Emergent or urgent care needs should never be communicated via [employer]pl[employer]Vity.  If you experience a medical emergency call 911 or go to the nearest emergency room.    Labs: It is recommended that you stay within the [facility] for labs but you are welcome to obtain ordered labs (with some exceptions) from any location of your choice as long as they are able to complete and process the needed labs. If you need us to fax orders to your preferred lab, please provide us the name and fax number of the lab you would like to go to so we can fax the orders. If your labs are drawn outside of the [facility], please have them fax the results to [phone] ([place]) or [phone] ([place]) or via [facility]. It is your  responsibility to ensure that outside lab results are sent to us.    We look forward to working with you. Please do not hesitate to reach out with any questions.    Thank you,    The Endocrine Team    [facility] Address:   [place] Address:     [address] [place] [address]    Phone: [phone]  Fax: [phone] [address] [place] [address]    Phone: [phone]  Fax: [phone]     [facility] Cost Estimate Phone Number: [phone]    General Lab and Imaging Scheduling Phone Number: [phone]

## 2024-04-26 NOTE — PROGRESS NOTES
Assessment/Plan :   Type 2 DM. Paul is doing great. We reviewed his recent CGMS report and his blood sugars are stable. He has the occasional post-prandial spike and he states that he continues to work on making healthy dietary decisions. He also plans on increasing exercise throughout the summer. I do not see any reason to make medication adjustments today. I encouraged him to keep up the good work and we will follow-up in 4-5 mos.       I have independently reviewed and interpreted labs, imaging as indicated.      Chief complaint:  Paul is a 54 year old male who returns for follow-up of Type 2 DM.    I have reviewed Care Everywhere including [facility], [facility], [facility], [facility], [place], [facility], [facility] , [facility], [place] lab reports, imaging reports and provider notes as indicated.      HISTORY OF PRESENT ILLNESS  Paul continues to work on managing his blood sugars. He was surprised that his A1C was not higher. He has been under a lot of stress over the last few months and he has noticed an increase in his overall blood sugars due to the stress. He has continued to take metformin 2000 mg daily, Jardiance 25 mg daily, Ozempic 2 mg weekly, insulin glargine 42 unit(s) twice daily and Novolog before meals based on a sliding scale. He continues to monitor his blood sugars closely with the FreeStyle Enrique 3 sensor.    Paul has not had any problems with severe hyperglycemia and/or hypoglycemia. However, he has noticed more glucose fluctuation, especially during stressful days. He has not had any issues with blurred vision or worsening numbness/tingling in his feet. Since the weather is getting nicer, he has also been out walking more and he and his wife plan on taking up [employer].    Paul was diagnosed with diabetes about 15 yrs ago. During that time he has struggled to get his blood sugars under consistent control. He attributes a lot of the problem to stress. He is still under a lot of  stress but he feels like the Ozempic has made a significant improvement in stabilizing his numbers. He has not needed as much Novolog, since starting Ozempic, and his blood sugars remain under good control. His diabetes is complicated by obesity, hyperlipidemia, HTN, and shoulder impingement.     Endocrine relevant labs are as follows:   Latest Reference Range & Units 04/26/24 08:27   Afinion Hemoglobin A1c POCT <=5.7 % 6.7 (H)   (H): Data is abnormally high   Latest Reference Range & Units 10/20/23 08:47   Albumin Urine mg/L mg/L <12.0      Latest Reference Range & Units 10/20/23 08:10   Hemoglobin A1C POCT 4.3 - <5.7 % 6.8 !   !: Data is abnormal    REVIEW OF SYSTEMS    Endocrine: positive for diabetes and obesity  Skin: negative  Eyes: negative for, visual blurring, redness, tearing  Ears/Nose/Throat: negative  Respiratory: No shortness of breath, dyspnea on exertion, cough, or hemoptysis  Cardiovascular: negative for, chest pain, dyspnea on exertion, lower extremity edema, and exercise intolerance  Gastrointestinal: negative for, nausea, vomiting, constipation, and diarrhea  Genitourinary: negative for, nocturia, dysuria, frequency, and urgency  Musculoskeletal: negative for, muscular weakness, nocturnal cramping, and foot pain  Neurologic: negative for, local weakness, numbness or tingling of hands, and numbness or tingling of feet  Psychiatric: negative  Hematologic/Lymphatic/Immunologic: negative    Past Medical History  Past Medical History:   Diagnosis Date    Diabetes type 2, uncontrolled     Gout     diet controlled    HTN, goal below 140/90     Hyperlipidemia     Obesity        Medications  Current Outpatient Medications   Medication Sig Dispense Refill    Ascorbic Acid (VITAMIN C PO)       aspirin 81 MG tablet Take 1 tablet by mouth daily. 30 tablet     atorvastatin (LIPITOR) 20 MG tablet Take 1 tablet (20 mg) by mouth daily 90 tablet 3    Continuous Glucose Sensor (FREESTYLE GEREMIAS 3 SENSOR) MISC 1 each  every 14 days 6 each 3    insulin aspart (NOVOLOG FLEXPEN) 100 UNIT/ML pen INJECT 12 UNITS PRIOR TO BREAKFAST, 16 UNITS PRIOR TO LUNCH, 8 UNITS PRIOR TO SUPPER 30 mL 3    insulin glargine (SEMGLEE) 100 UNIT/ML pen Inject 42 Units Subcutaneous 2 times daily 80 mL 1    insulin lispro (HUMALOG KWIKPEN) 100 UNIT/ML (1 unit dial) KWIKPEN Take before meals based on a sliding scale. TDD 20 unit(s) 30 mL 1    insulin pen needle (B-D U/F) 31G X 5 MM miscellaneous Inject Subcutaneous 4 times daily ( 3 times daily with meals and at bedtime) 400 each 3    JARDIANCE 25 MG TABS tablet TAKE 1 TABLET BY MOUTH EVERY DAY 30 tablet 0    losartan (COZAAR) 100 MG tablet TAKE 1 TABLET BY MOUTH EVERY DAY 90 tablet 0    MAGNESIUM PO       metFORMIN (GLUCOPHAGE) 1000 MG tablet TAKE 1 TABLET BY MOUTH TWICE A DAY WITH MEALS 180 tablet 0    Multiple Vitamins-Minerals (ZINC PO)       QUERCETIN PO       Semaglutide, 2 MG/DOSE, (OZEMPIC) 8 MG/3ML pen Inject 2 mg Subcutaneous every 7 days 9 mL 3    VITAMIN D PO          Allergies  Allergies   Allergen Reactions    Zantac          Family History  family history includes C.A.D. in his maternal grandfather; Diabetes in his father and maternal grandmother; Hypertension in his mother.    Social History  Social History     Tobacco Use    Smoking status: Former     Current packs/day: 0.00     Average packs/day: 1.5 packs/day for 13.0 years (19.5 ttl pk-yrs)     Types: Cigarettes     Start date: 1984     Quit date: 1997     Years since quittin.7    Smokeless tobacco: Never   Vaping Use    Vaping status: Never Used   Substance Use Topics    Alcohol use: Yes     Comment: very little    Drug use: No       Physical Exam  /86 (BP Location: Left arm, Patient Position: Sitting, Cuff Size: Adult Large)   Pulse 76   Resp 18   Wt 113.4 kg (250 lb)   SpO2 98%   BMI 35.36 kg/m    Body mass index is 35.36 kg/m .  GENERAL :  In no apparent distress  SKIN: Normal color, normal temperature,  texture.  No hirsutism, alopecia or purple striae.     EYES: PERRL, EOMI, No scleral icterus,  No proptosis, conjunctival redness, stare, retraction  RESP: Lungs clear to auscultation bilaterally  CARDIAC: Regular rate and rhythm, normal S1 S2, without murmurs, rubs or gallops      NEURO: awake, alert, responds appropriately to questions.  Cranial nerves intact.   Moves all extremities; Gait normal.  No tremor of the outstretched hand.    EXTREMITIES: No clubbing, cyanosis or edema.    DATA REVIEW  FreeStyle LibreView  Time in target range 74%  High 25% and Very High 1%  Current Ave  mg/dl

## 2024-04-26 NOTE — NURSING NOTE
Paul Ortiz's goals for this visit include:   Chief Complaint   Patient presents with    Follow Up     DM       He requests these members of his care team be copied on today's visit information: yes    PCP: Dominik Thomas    Referring Provider:  No referring provider defined for this encounter.    /86 (BP Location: Left arm, Patient Position: Sitting, Cuff Size: Adult Large)   Pulse 76   Resp 18   Wt 113.4 kg (250 lb)   SpO2 98%   BMI 35.36 kg/m      Do you need any medication refills at today's visit? yes    Parmjit Urrutia, EMT

## 2024-05-17 ENCOUNTER — MYC REFILL (OUTPATIENT)
Dept: FAMILY MEDICINE | Facility: CLINIC | Age: 54
End: 2024-05-17
Payer: COMMERCIAL

## 2024-05-17 DIAGNOSIS — I10 BENIGN ESSENTIAL HYPERTENSION: ICD-10-CM

## 2024-05-17 DIAGNOSIS — N18.2 TYPE 2 DIABETES MELLITUS WITH STAGE 2 CHRONIC KIDNEY DISEASE, WITHOUT LONG-TERM CURRENT USE OF INSULIN (H): ICD-10-CM

## 2024-05-17 DIAGNOSIS — E11.22 TYPE 2 DIABETES MELLITUS WITH STAGE 2 CHRONIC KIDNEY DISEASE, WITHOUT LONG-TERM CURRENT USE OF INSULIN (H): ICD-10-CM

## 2024-05-17 RX ORDER — LOSARTAN POTASSIUM 100 MG/1
100 TABLET ORAL DAILY
Qty: 90 TABLET | Refills: 0 | Status: SHIPPED | OUTPATIENT
Start: 2024-05-17 | End: 2024-06-13

## 2024-05-20 NOTE — TELEPHONE ENCOUNTER
I see one month supply of his losartan, metoprolol, and jardiance was sent to pharmacy by Dominik Thomas.    Patient had a routine visit 8/8/23.    See patient's message below, he was just seen by endocrinology.   Perhaps can defer routine visit with PCP until August?    Routed to PCP to reconsider; will need longer fills sent if okay to wait until August.    Yaneli STEVENSON RN  [facility] Triage        BP Readings from Last 3 Encounters:   04/26/24 124/86   10/20/23 116/84   08/08/23 130/87     Creatinine   Date Value Ref Range Status   10/20/2023 1.12 0.67 - 1.17 mg/dL Final   04/14/2021 1.01 0.66 - 1.25 mg/dL Final     Lab Results   Component Value Date    A1C 6.7 04/26/2024    A1C 10.3 10/04/2022    A1C 9.1 06/09/2022    A1C 8.1 02/09/2022    A1C 7.9 09/09/2021    A1C 7.5 04/14/2021    A1C 7.9 04/08/2021    A1C 9.1 01/04/2021    A1C 8.5 07/28/2020    A1C 6.9 08/27/2019

## 2024-05-20 NOTE — TELEPHONE ENCOUNTER
FYI - Status Update    Who is Calling: patient    Update: patient is calling in after getting a message saying that Paul Ortiz would need to have an appointment scheduled.  Patient states that he was just seen in endocrinology three weeks ago and does not want to come in for another appointment please call patient     Does caller want a call/response back: Yes     Could we send this information to you in BOKU or would you prefer to receive a phone call?:   Patient would prefer a phone call   Okay to leave a detailed message?: Yes at Cell number on file:    Telephone Information:   Mobile [phone]

## 2024-05-24 NOTE — TELEPHONE ENCOUNTER
RN left message to return call to clinic [phone].  (RN did not leave specific details on voicemail for confidential reasons)    Kassi Carmen RN on 5/24/2024 at 9:03 AM

## 2024-05-30 NOTE — TELEPHONE ENCOUNTER
Called [phone] (home)    Did they answer the phone: No, left a message on voicemail to return call to the [facility] at [phone], and to ask for any available triage nurse.  (RN did not leave specific details on voicemail for confidential reasons)    Marleny VALLADARES RN  Triage Nurse  [facility]

## 2024-05-31 NOTE — TELEPHONE ENCOUNTER
Pt returned call. Pt asked why he needed to be seen for an appointment prior to receiving further refills. Provider's message relayed to pt. Pt voiced frustrations as he doesn't think he needs to be seen for this and states it is a waste of time and money. Pt states he will have this discussion with PCP. Pt agreed to schedule appt with PCP. RN assisted with scheduling.     Tata Newsome RN  [facility]

## 2024-06-13 ENCOUNTER — OFFICE VISIT (OUTPATIENT)
Dept: FAMILY MEDICINE | Facility: CLINIC | Age: 54
End: 2024-06-13
Payer: COMMERCIAL

## 2024-06-13 VITALS
RESPIRATION RATE: 20 BRPM | DIASTOLIC BLOOD PRESSURE: 86 MMHG | SYSTOLIC BLOOD PRESSURE: 120 MMHG | HEART RATE: 73 BPM | WEIGHT: 254.8 LBS | TEMPERATURE: 98 F | OXYGEN SATURATION: 96 % | BODY MASS INDEX: 35.67 KG/M2 | HEIGHT: 71 IN

## 2024-06-13 DIAGNOSIS — Z12.5 SCREENING FOR PROSTATE CANCER: ICD-10-CM

## 2024-06-13 DIAGNOSIS — N18.2 TYPE 2 DIABETES MELLITUS WITH STAGE 2 CHRONIC KIDNEY DISEASE, WITHOUT LONG-TERM CURRENT USE OF INSULIN (H): ICD-10-CM

## 2024-06-13 DIAGNOSIS — E78.5 HYPERLIPIDEMIA LDL GOAL <100: ICD-10-CM

## 2024-06-13 DIAGNOSIS — I10 BENIGN ESSENTIAL HYPERTENSION: Primary | ICD-10-CM

## 2024-06-13 DIAGNOSIS — E66.01 CLASS 2 SEVERE OBESITY DUE TO EXCESS CALORIES WITH SERIOUS COMORBIDITY AND BODY MASS INDEX (BMI) OF 35.0 TO 35.9 IN ADULT (H): ICD-10-CM

## 2024-06-13 DIAGNOSIS — N18.2 TYPE 2 DIABETES MELLITUS WITH STAGE 2 CHRONIC KIDNEY DISEASE, WITH LONG-TERM CURRENT USE OF INSULIN (H): ICD-10-CM

## 2024-06-13 DIAGNOSIS — E11.22 TYPE 2 DIABETES MELLITUS WITH STAGE 2 CHRONIC KIDNEY DISEASE, WITH LONG-TERM CURRENT USE OF INSULIN (H): ICD-10-CM

## 2024-06-13 DIAGNOSIS — E11.22 TYPE 2 DIABETES MELLITUS WITH STAGE 2 CHRONIC KIDNEY DISEASE, WITHOUT LONG-TERM CURRENT USE OF INSULIN (H): ICD-10-CM

## 2024-06-13 DIAGNOSIS — E66.812 CLASS 2 SEVERE OBESITY DUE TO EXCESS CALORIES WITH SERIOUS COMORBIDITY AND BODY MASS INDEX (BMI) OF 35.0 TO 35.9 IN ADULT (H): ICD-10-CM

## 2024-06-13 DIAGNOSIS — Z79.4 TYPE 2 DIABETES MELLITUS WITH STAGE 2 CHRONIC KIDNEY DISEASE, WITH LONG-TERM CURRENT USE OF INSULIN (H): ICD-10-CM

## 2024-06-13 PROCEDURE — 99214 OFFICE O/P EST MOD 30 MIN: CPT | Performed by: PHYSICIAN ASSISTANT

## 2024-06-13 RX ORDER — ATORVASTATIN CALCIUM 20 MG/1
20 TABLET, FILM COATED ORAL DAILY
Qty: 90 TABLET | Refills: 3 | Status: SHIPPED | OUTPATIENT
Start: 2024-06-13

## 2024-06-13 RX ORDER — LOSARTAN POTASSIUM 100 MG/1
100 TABLET ORAL DAILY
Qty: 90 TABLET | Refills: 1 | Status: SHIPPED | OUTPATIENT
Start: 2024-06-13

## 2024-06-13 ASSESSMENT — PAIN SCALES - GENERAL: PAINLEVEL: NO PAIN (0)

## 2024-06-13 NOTE — PROGRESS NOTES
"    Jazz Miller is a 54 year old, presenting for the following health issues:  Hypertension (recheck)        6/13/2024     7:09 AM   Additional Questions   Roomed by Nerissa Gabriel CMA   Accompanied by None         6/13/2024     7:09 AM   Patient Reported Additional Medications   Patient reports taking the following new medications none     History of Present Illness       Hypertension: He presents for follow up of hypertension.  He does not check blood pressure  regularly outside of the clinic. Outpatient blood pressures have not been over 140/90. He follows a low salt diet.     He eats 0-1 servings of fruits and vegetables daily.He consumes 1 sweetened beverage(s) daily.He exercises with enough effort to increase his heart rate 9 or less minutes per day.  He exercises with enough effort to increase his heart rate 3 or less days per week.   He is taking medications regularly.     No chest pain/sob/palpitations/dizziness/ha's  Dm doing well. Recent A1c of 6.7.   No neuropathy symptoms or vision changes.   Recheck of obesity. Discussed a lower calorie diet and consistent mix of aerobic exercise and strength training. This will help maintain/treat his blood pressure and sugars       Review of Systems  Constitutional, HEENT, cardiovascular, pulmonary, GI, , musculoskeletal, neuro, skin, endocrine and psych systems are negative, except as otherwise noted.      Objective    /86   Pulse 73   Temp 98  F (36.7  C) (Oral)   Resp 20   Ht 1.791 m (5' 10.5\")   Wt 115.6 kg (254 lb 12.8 oz)   SpO2 96%   BMI 36.04 kg/m    Body mass index is 36.04 kg/m .  Physical Exam   Eye exam - right eye normal lid, conjunctiva, cornea, pupil and fundus, left eye normal lid, conjunctiva, cornea, pupil and fundus.  Thyroid not palpable, not enlarged, no nodules detected.  CHEST:chest clear to IPPA, no tachypnea, retractions or cyanosis, and S1, S2 normal, no murmur, no gallop, rate regular.  Foot exam - both sides normal; " no swelling, tenderness or skin or vascular lesions. Color and temperature is normal. Sensation is intact. Peripheral pulses are palpable. Toenails are normal.    Paul was seen today for hypertension.    Diagnoses and all orders for this visit:    Benign essential hypertension  -     losartan (COZAAR) 100 MG tablet; Take 1 tablet (100 mg) by mouth daily    Type 2 diabetes mellitus with stage 2 chronic kidney disease, with long-term current use of insulin (H)  -     Lipid panel reflex to direct LDL Non-fasting; Future  -     Adult Eye  Referral; Future    Type 2 diabetes mellitus with stage 2 chronic kidney disease, without long-term current use of insulin (H)  -     empagliflozin (JARDIANCE) 25 MG TABS tablet; Take 1 tablet (25 mg) by mouth daily  -     Discontinue: metFORMIN (GLUCOPHAGE) 1000 MG tablet; Take 1 tablet (1,000 mg) by mouth 2 times daily (with meals)  -     Semaglutide, 2 MG/DOSE, (OZEMPIC) 8 MG/3ML pen; Inject 2 mg Subcutaneous every 7 days  -     metFORMIN (GLUCOPHAGE) 1000 MG tablet; Take 1 tablet (1,000 mg) by mouth 2 times daily (with meals)    Hyperlipidemia LDL goal <100  -     atorvastatin (LIPITOR) 20 MG tablet; Take 1 tablet (20 mg) by mouth daily  -     Lipid panel reflex to direct LDL Fasting; Future    Screening for prostate cancer  -     PSA, screen; Future    Other orders  -     REVIEW OF HEALTH MAINTENANCE PROTOCOL ORDERS    More exercise     work on lifestyle modification  Continue all meds.    Signed Electronically by: Dominik Thomas PA-C

## 2024-06-30 ENCOUNTER — HEALTH MAINTENANCE LETTER (OUTPATIENT)
Age: 54
End: 2024-06-30

## 2024-08-19 ENCOUNTER — LAB (OUTPATIENT)
Dept: LAB | Facility: CLINIC | Age: 54
End: 2024-08-19
Payer: COMMERCIAL

## 2024-08-19 DIAGNOSIS — Z12.5 SCREENING FOR PROSTATE CANCER: ICD-10-CM

## 2024-08-19 DIAGNOSIS — E78.5 HYPERLIPIDEMIA LDL GOAL <100: ICD-10-CM

## 2024-08-19 LAB
CHOLEST SERPL-MCNC: 130 MG/DL
FASTING STATUS PATIENT QL REPORTED: YES
HDLC SERPL-MCNC: 43 MG/DL
LDLC SERPL CALC-MCNC: 63 MG/DL
NONHDLC SERPL-MCNC: 87 MG/DL
PSA SERPL DL<=0.01 NG/ML-MCNC: 1.21 NG/ML (ref 0–3.5)
TRIGL SERPL-MCNC: 122 MG/DL

## 2024-08-19 PROCEDURE — 36415 COLL VENOUS BLD VENIPUNCTURE: CPT

## 2024-08-19 PROCEDURE — G0103 PSA SCREENING: HCPCS

## 2024-08-19 PROCEDURE — 80061 LIPID PANEL: CPT

## 2024-08-22 DIAGNOSIS — N18.2 TYPE 2 DIABETES MELLITUS WITH STAGE 2 CHRONIC KIDNEY DISEASE, WITHOUT LONG-TERM CURRENT USE OF INSULIN (H): Primary | ICD-10-CM

## 2024-08-22 DIAGNOSIS — E11.22 TYPE 2 DIABETES MELLITUS WITH STAGE 2 CHRONIC KIDNEY DISEASE, WITHOUT LONG-TERM CURRENT USE OF INSULIN (H): Primary | ICD-10-CM

## 2024-08-22 RX ORDER — INSULIN GLARGINE 100 [IU]/ML
INJECTION, SOLUTION SUBCUTANEOUS
Qty: 75 ML | Refills: 1 | Status: SHIPPED | OUTPATIENT
Start: 2024-08-22

## 2024-08-22 NOTE — TELEPHONE ENCOUNTER
Medication Requested:  insulin glargine (LANTUS PEN) 100 UNIT/ML pen -- -- 2024 -- No   Si units twice a day   Class: Historical   Notes to Pharmacy: If Lantus is not covered by insurance, may substitute Basaglar or Semglee or other insulin glargine product per insurance preference at same dose and frequency.       ----------------------  Last Office Visit : 2024  [facility]      Future Office visit:    10/31/2024 2:40 PM (50 min)  [facility]    RETURN DIABETES   [facility] ([place]   Mendy Dorsey PA-C   [facility] NURSE     ----------------------      Refill decision: Refill pended and routed to the provider for review/determination due to the following criteria not met:     Insulin and insulin pump supplies - refilled per Endocrine clinic.

## 2024-10-02 ENCOUNTER — TRANSFERRED RECORDS (OUTPATIENT)
Dept: MULTI SPECIALTY CLINIC | Facility: CLINIC | Age: 54
End: 2024-10-02

## 2024-10-02 LAB — RETINOPATHY: NORMAL

## 2024-10-17 ENCOUNTER — TELEPHONE (OUTPATIENT)
Dept: ENDOCRINOLOGY | Facility: CLINIC | Age: 54
End: 2024-10-17
Payer: COMMERCIAL

## 2024-10-17 NOTE — TELEPHONE ENCOUNTER
Left Voicemail (1st Attempt) and Sent [email or web address] (1st Attempt) for the patient to call back and schedule the following:    Appointment type: Return Diabetes  Provider: Mendy Dorsey  Return date: 10/31/2024(Nurse at 2 p.m., Mendy at 2:30 p.m.)  Specialty phone number: [phone]  Additional appointment(s) needed:   Additonal Notes:     Attempted to move the appointment with Mendy Dorsey on 10/31/2024 to earlier, with arrival at 2:00 p.m. for the Endo Nurse visit and then patient would see Mendy Dorsey at 2:30 p.m.    Overbook approved by Stacie Montelongo.    Also sent a [employer] message.    Araceli LEONARD/Complex Procedure    [facility]   Neurology, NeuroSurgery, NeuroPsychology, Pain Management and Cardiology Specialties  Medical/Surgical Adult Specialties

## 2024-10-21 NOTE — TELEPHONE ENCOUNTER
Left Voicemail (2nd Attempt) and Sent Mychart (2nd Attempt) for the patient to call back and schedule the following:    Appointment type: Return Diabetes  Provider: Mendy Dorsey  Return date: 10/31/2024 @ 2:30 p.m.  Specialty phone number: [phone]  Additional appointment(s) needed: yes  Additonal Notes: Endo Nurse Visit at 2 p.m..    2nd attempt to move Endo Nurse appointment to 2 p.m. and Mendy Dorsey appointment to 2:30 p.m.    Also sent 2 MyChart message.    Araceli LEONARD/Complex Procedure    [facility]   Neurology, NeuroSurgery, NeuroPsychology, Pain Management and Cardiology Specialties  Medical/Surgical Adult Specialties

## 2024-10-24 ENCOUNTER — TELEPHONE (OUTPATIENT)
Dept: ENDOCRINOLOGY | Facility: CLINIC | Age: 54
End: 2024-10-24
Payer: COMMERCIAL

## 2024-10-24 DIAGNOSIS — E11.9 TYPE 2 DIABETES, HBA1C GOAL < 7% (H): Primary | ICD-10-CM

## 2024-10-24 NOTE — TELEPHONE ENCOUNTER
Due to Insurance change from [employer] to [employer] insurance requires Novolog. Please send New Rx Novolog Humalog and generic not covered 90 day supply per insurance too       Pharmacy [facility] [id number] [place]  in chart is requesting rx       Margaret Bradford CMA  Adult Endocrinology   [facility]

## 2024-10-31 ENCOUNTER — OFFICE VISIT (OUTPATIENT)
Dept: ENDOCRINOLOGY | Facility: CLINIC | Age: 54
End: 2024-10-31
Payer: COMMERCIAL

## 2024-10-31 VITALS
OXYGEN SATURATION: 97 % | WEIGHT: 248 LBS | SYSTOLIC BLOOD PRESSURE: 127 MMHG | DIASTOLIC BLOOD PRESSURE: 91 MMHG | HEART RATE: 77 BPM | BODY MASS INDEX: 35.08 KG/M2

## 2024-10-31 DIAGNOSIS — E11.22 TYPE 2 DIABETES MELLITUS WITH STAGE 2 CHRONIC KIDNEY DISEASE, WITHOUT LONG-TERM CURRENT USE OF INSULIN (H): ICD-10-CM

## 2024-10-31 DIAGNOSIS — N18.2 TYPE 2 DIABETES MELLITUS WITH STAGE 2 CHRONIC KIDNEY DISEASE, WITHOUT LONG-TERM CURRENT USE OF INSULIN (H): ICD-10-CM

## 2024-10-31 DIAGNOSIS — E11.9 TYPE 2 DIABETES, HBA1C GOAL < 7% (H): ICD-10-CM

## 2024-10-31 LAB
ANION GAP SERPL CALCULATED.3IONS-SCNC: 11 MMOL/L (ref 7–15)
BUN SERPL-MCNC: 12 MG/DL (ref 6–20)
CALCIUM SERPL-MCNC: 10 MG/DL (ref 8.8–10.4)
CHLORIDE SERPL-SCNC: 103 MMOL/L (ref 98–107)
CREAT SERPL-MCNC: 1.09 MG/DL (ref 0.67–1.17)
EGFRCR SERPLBLD CKD-EPI 2021: 80 ML/MIN/1.73M2
EST. AVERAGE GLUCOSE BLD GHB EST-MCNC: 148 MG/DL
GLUCOSE SERPL-MCNC: 131 MG/DL (ref 70–99)
HBA1C MFR BLD: 6.8 %
HCO3 SERPL-SCNC: 26 MMOL/L (ref 22–29)
POTASSIUM SERPL-SCNC: 4.5 MMOL/L (ref 3.4–5.3)
SODIUM SERPL-SCNC: 140 MMOL/L (ref 135–145)

## 2024-10-31 PROCEDURE — 99214 OFFICE O/P EST MOD 30 MIN: CPT | Performed by: PHYSICIAN ASSISTANT

## 2024-10-31 PROCEDURE — 82043 UR ALBUMIN QUANTITATIVE: CPT | Performed by: PHYSICIAN ASSISTANT

## 2024-10-31 PROCEDURE — 83036 HEMOGLOBIN GLYCOSYLATED A1C: CPT | Performed by: PHYSICIAN ASSISTANT

## 2024-10-31 PROCEDURE — 82570 ASSAY OF URINE CREATININE: CPT | Performed by: PHYSICIAN ASSISTANT

## 2024-10-31 PROCEDURE — 36415 COLL VENOUS BLD VENIPUNCTURE: CPT | Performed by: PHYSICIAN ASSISTANT

## 2024-10-31 PROCEDURE — 80048 BASIC METABOLIC PNL TOTAL CA: CPT | Performed by: PHYSICIAN ASSISTANT

## 2024-10-31 RX ORDER — INSULIN GLARGINE 100 [IU]/ML
INJECTION, SOLUTION SUBCUTANEOUS
Qty: 75 ML | Refills: 1 | Status: SHIPPED | OUTPATIENT
Start: 2024-10-31

## 2024-10-31 RX ORDER — ACYCLOVIR 400 MG/1
TABLET ORAL
Qty: 9 EACH | Refills: 1 | Status: SHIPPED | OUTPATIENT
Start: 2024-10-31

## 2024-10-31 RX ORDER — FLURBIPROFEN SODIUM 0.3 MG/ML
SOLUTION/ DROPS OPHTHALMIC 4 TIMES DAILY
Qty: 400 EACH | Refills: 3 | Status: SHIPPED | OUTPATIENT
Start: 2024-10-31

## 2024-10-31 NOTE — PATIENT INSTRUCTIONS
Welcome to the [facility] Endocrinology and Diabetes Clinics     Our Endocrinology Clinics are here to provide you with a team-based, collaborative approach in the diagnosis and treatment of patients with diabetes and endocrine disorders. The team is made up of Physicians, Physician Assistants, Certified Diabetes Educators, Registered Nurses, Medical Assistants, Emergency Medical Technicians, and many others, all of whom have the unified goal of providing our patients with high quality care.     Please see below for some helpful tips to best navigate and use the [facility] Endocrinology clinic:     [place] Respect: At [facility], we are committed to a respectful and safe space for all patients, visitors, and staff.  We believe that mutual respect between patients and their care team is the foundation of quality care.  It is our expectation that you will be treated with respect by your care team.  In turn, we ask that all communication with the care team (written and verbal) be respectful and free from profanity, threatening, or abusive language.  Disrespectful communication undermines our therapeutic relationship with you and may result in us being unable to continue to provide your care.    Refills: A provider must see you at least annually to prescribe and refill medications. This is to ensure your safety as well as meet insurance and compliance regulations.    Scheduling: Many of our Providers offer both in-person or video visits. Please call to schedule any needed follow ups as soon as possible because our provider schedules fill up very quickly. Our care team has the right to require an in-person visit when they believe that it is medically necessary. Please remember that for any virtual visits, you must be in the [facility] at the time of the visit, otherwise we are unable to see you and you will need to be rescheduled.    Missed Appointments: If you need to cancel or miss your  scheduled appointment, please call the clinic at [phone] to reschedule.  Please note if you repeatedly miss appointments or repeatedly miss appointments without calling to inform us ahead of time (no-show), the clinic may elect to not allow you to reschedule without speaking to a manager, may require a Partnership In Care Agreement prior to rescheduling, or could result in you no longer being able to receive care from the clinic. Providing the clinic with timely notification if you have to miss an appointment, allows us to better serve the needs of all of our patients.    Primary Care Provider: Our Endocrinologists are Specialists in their field. We expect you to have a Primary Care Provider established to handle any needs outside of your diabetes and endocrine care.  We would be happy to assist you find a Primary Care Provider, if you do not have one.    Adan: Adan is a wonderful resource that allows you access to your Care Team via online or the sravan. Please ask a member of the team if you would like help creating an account. Please note that it may take up to 2 business days for a response. Adan messages are not reviewed on weekends or after business hours.  Emergent or urgent care needs should never be communicated via Adan.  If you experience a medical emergency call 911 or go to the nearest emergency room.    Labs: It is recommended that you stay within the [facility] for labs but you are welcome to obtain ordered labs (with some exceptions) from any location of your choice as long as they are able to complete and process the needed labs. If you need us to fax orders to your preferred lab, please provide us the name and fax number of the lab you would like to go to so we can fax the orders. If your labs are drawn outside of the [facility], please have them fax the results to [phone] ([place]) or [phone] ([place]) or via [facility]. It is your  responsibility to ensure that outside lab results are sent to us.    We look forward to working with you. Please do not hesitate to reach out with any questions.    Thank you,    The Endocrine Team    [facility] Address:   [place] Address:     [address] [place] [address]    Phone: [phone]  Fax: [phone] [address] [place] [address]    Phone: [phone]  Fax: [phone]     [facility] Cost Estimate Phone Number: [phone]    General Lab and Imaging Scheduling Phone Number: [phone]

## 2024-10-31 NOTE — NURSING NOTE
Paul Ortiz's goals for this visit include:   Chief Complaint   Patient presents with    Follow Up    Diabetes     DMII       He requests these members of his care team be copied on today's visit information: yes    PCP: Dominik Thomas    Referring Provider:  Referred Self, MD  No address on file    BP (!) 127/91   Pulse 77   Wt 112.5 kg (248 lb)   SpO2 97%   BMI 35.08 kg/m      Do you need any medication refills at today's visit? Yes    Parmjit Urrutia, EMT

## 2024-10-31 NOTE — LETTER
10/31/2024      Paul Ortiz  [address] [facility] [address]      Dear Colleague,    Thank you for referring your patient, Paul Ortiz, to the [facility]. Please see a copy of my visit note below.        Assessment/Plan :   Type 2 DM. Paul continues to work on managing his blood sugars. He has noticed that his blood sugars are more stable since he left his previous job. He feels like the stress was adversely effecting of his blood sugars. We reviewed his recent CGMS report and his blood sugars look great. I do see where his numbers have dropped overnight. We discussed the concept of compression lows and I wonder if these episodes may be, at least partly related to compression. If he continues to have overnight lows, he will contact our office. We will continue with his current medications, for now. I will send in a new prescription for the Dexcom G7 sensor. If he has any trouble switching over, he will contact our office. We will follow-up in 6 mos.      I have independently reviewed and interpreted labs, imaging as indicated.      Chief complaint:  Paul is a 54 year old male who returns for follow-up of Type 2 DM.    I have reviewed Care Everywhere including [facility], On license of [facility], [facility] [place], [facility], [facility], [facility] , [facility], [place] lab reports, imaging reports and provider notes as indicated.      HISTORY OF PRESENT ILLNESS  Paul is doing well. He recently  from his previous employer which was very stressful. He has been unemployed for about a month and he has noticed that his blood sugars have improved during this time. He knows that the stress from his job was adversely affecting his blood sugars. He has continued to take Ozempic 2 mg weekly, metformin 2000 mg daily, Lantus 42 unit(s) twice daily, Jardiance 25 mg daily and Novolog before meals as needed. Over the last month, he has only needed Novolog a few times.    Paul  continues to use the FreeStyle Enrique 3 to monitor his blood sugars. He was recently notified that his new insurance prefers Dexcom, so he would like to switch, if possible. He has not had any recent episodes of severe hyperglycemia and/or hypoglycemia. He has had a couple odd hypoglycemic episodes overnight. His sensor alarm alerted him and he was able to correct with a small snack. He has not had any problems with blurred vision or an increase in numbness/tingling in his feet.     Paul was diagnosed with diabetes about 15 yrs ago. During that time he has struggled to get his blood sugars under consistent control. He attributes a lot of the problem to stress. He is still under a lot of stress but he feels like the Ozempic has made a significant improvement in stabilizing his numbers. He has not needed as much Novolog, since starting Ozempic, and his blood sugars remain under good control. His diabetes is complicated by obesity, hyperlipidemia, HTN, and shoulder impingement.      Endocrine relevant labs are as follows:   Latest Reference Range & Units 10/31/24 13:52   Afinion Hemoglobin A1c POCT <=5.7 % 6.8 (H)   (H): Data is abnormally high   Latest Reference Range & Units 04/26/24 08:27   Afinion Hemoglobin A1c POCT <=5.7 % 6.7 (H)   (H): Data is abnormally high   Latest Reference Range & Units 10/20/23 08:10   Hemoglobin A1C POCT 4.3 - <5.7 % 6.8 !   !: Data is abnormal   Latest Reference Range & Units 10/20/23 08:47   Albumin Urine mg/L mg/L <12.0     REVIEW OF SYSTEMS    Endocrine: positive for diabetes and obesity  Skin: negative  Eyes: negative for, visual blurring, redness, tearing  Ears/Nose/Throat: negative  Respiratory: No shortness of breath, dyspnea on exertion, cough, or hemoptysis  Cardiovascular: negative for, chest pain, dyspnea on exertion, lower extremity edema, and exercise intolerance  Gastrointestinal: negative for, nausea, vomiting, constipation, and diarrhea  Genitourinary: negative for,  nocturia, dysuria, frequency, and urgency  Musculoskeletal: negative for, muscular weakness, nocturnal cramping, and foot pain  Neurologic: negative for, local weakness, numbness or tingling of hands, and numbness or tingling of feet  Psychiatric: negative  Hematologic/Lymphatic/Immunologic: negative    Past Medical History  Past Medical History:   Diagnosis Date     Diabetes type 2, uncontrolled      Gout     diet controlled     HTN, goal below 140/90      Hyperlipidemia      Obesity        Medications  Current Outpatient Medications   Medication Sig Dispense Refill     Ascorbic Acid (VITAMIN C PO)        aspirin 81 MG tablet Take 1 tablet by mouth daily. 30 tablet      atorvastatin (LIPITOR) 20 MG tablet Take 1 tablet (20 mg) by mouth daily 90 tablet 3     Continuous Glucose Sensor (FREESTYLE GEREMIAS 3 SENSOR) MISC 1 each every 14 days 6 each 3     empagliflozin (JARDIANCE) 25 MG TABS tablet Take 1 tablet (25 mg) by mouth daily 90 tablet 1     insulin aspart (NOVOLOG PEN) 100 UNIT/ML pen Uses up to 20 units daily for carb coverage, correction, and priming 15 mL 0     insulin glargine (LANTUS SOLOSTAR) 100 UNIT/ML pen INJECT 42 UNITS SUBCUTANEOUSLY 2 TIMES DAILY 75 mL 1     insulin lispro (HUMALOG KWIKPEN) 100 UNIT/ML (1 unit dial) KWIKPEN Take before meals based on a sliding scale. TDD 20 unit(s) 30 mL 1     insulin pen needle (B-D U/F) 31G X 5 MM miscellaneous Inject Subcutaneous 4 times daily ( 3 times daily with meals and at bedtime) 400 each 3     losartan (COZAAR) 100 MG tablet Take 1 tablet (100 mg) by mouth daily 90 tablet 1     MAGNESIUM PO        metFORMIN (GLUCOPHAGE) 1000 MG tablet Take 1 tablet (1,000 mg) by mouth 2 times daily (with meals) 180 tablet 1     Multiple Vitamins-Minerals (ZINC PO)        QUERCETIN PO        Semaglutide, 2 MG/DOSE, (OZEMPIC) 8 MG/3ML pen Inject 2 mg Subcutaneous every 7 days 9 mL 3     VITAMIN D PO          Allergies  Allergies   Allergen Reactions     Zantac          Family  History  family history includes C.A.D. in his maternal grandfather; Diabetes in his father and maternal grandmother; Hypertension in his mother.    Social History  Social History     Tobacco Use     Smoking status: Former     Current packs/day: 0.00     Average packs/day: 1.5 packs/day for 13.0 years (19.5 ttl pk-yrs)     Types: Cigarettes     Start date: 1984     Quit date: 1997     Years since quittin.2     Smokeless tobacco: Never   Vaping Use     Vaping status: Never Used   Substance Use Topics     Alcohol use: Yes     Comment: very little     Drug use: No       Physical Exam  BP (!) 127/91   Pulse 77   Wt 112.5 kg (248 lb)   SpO2 97%   BMI 35.08 kg/m    Body mass index is 35.08 kg/m .  GENERAL :  In no apparent distress  SKIN: Normal color, normal temperature, texture.  No hirsutism, alopecia or purple striae.     EYES: PERRL, EOMI, No scleral icterus,  No proptosis, conjunctival redness, stare, retraction  RESP: Lungs clear to auscultation bilaterally  CARDIAC: Regular rate and rhythm, normal S1 S2, without murmurs, rubs or gallops    NEURO: awake, alert, responds appropriately to questions.  Cranial nerves intact.   Moves all extremities; Gait normal.  No tremor of the outstretched hand.    EXTREMITIES: No clubbing, cyanosis or edema.    DATA REVIEW  [facility] LibreView  Time in target range 81%  High 18%, Very High 1%  Current Ave  mg/dl        Again, thank you for allowing me to participate in the care of your patient.        Sincerely,        Mendy Dorsey PA-C

## 2024-10-31 NOTE — LETTER
November 6, 2024      Paul Ortiz  [address] [address] [address]        Dear ,    We are writing to inform you of your test results.    Your labs look great. There are no signs of protein in your urine. Keep up the good work.     Resulted Orders   AFINION HEMOGLOBIN A1C POCT   Result Value Ref Range    Estimated Average Glucose POCT 148 (H) <117    Afinion Hemoglobin A1c POCT 6.8 (H) <=5.7 %      Comment:      Normal <5.7%   Prediabetes 5.7-6.4%    Diabetes 6.5% or higher     Note: Adopted from ADA consensus guidelines.   Basic metabolic panel   Result Value Ref Range    Sodium 140 135 - 145 mmol/L    Potassium 4.5 3.4 - 5.3 mmol/L    Chloride 103 98 - 107 mmol/L    Carbon Dioxide (CO2) 26 22 - 29 mmol/L    Anion Gap 11 7 - 15 mmol/L    Urea Nitrogen 12.0 6.0 - 20.0 mg/dL    Creatinine 1.09 0.67 - 1.17 mg/dL    GFR Estimate 80 >60 mL/min/1.73m2      Comment:      eGFR calculated using 2021 CKD-EPI equation.    Calcium 10.0 8.8 - 10.4 mg/dL      Comment:      Reference intervals for this test were updated on 7/16/2024 to reflect our healthy population more accurately. There may be differences in the flagging of prior results with similar values performed with this method. Those prior results can be interpreted in the context of the updated reference intervals.    Glucose 131 (H) 70 - 99 mg/dL   Albumin Random Urine Quantitative with Creat Ratio   Result Value Ref Range    Creatinine Urine mg/dL 99.4 mg/dL      Comment:      The reference ranges have not been established in urine creatinine. The results should be integrated into the clinical context for interpretation.    Albumin Urine mg/L <12.0 mg/L      Comment:      The reference ranges have not been established in urine albumin. The results should be integrated into the clinical context for interpretation.    Albumin Urine mg/g Cr        Comment:      Unable to calculate, urine albumin and/or urine creatinine is outside detectable  limits.  Microalbuminuria is defined as an albumin:creatinine ratio of 17 to 299 for males and 25 to 299 for females. A ratio of albumin:creatinine of 300 or higher is indicative of overt proteinuria.  Due to biologic variability, positive results should be confirmed by a second, first-morning random or 24-hour timed urine specimen. If there is discrepancy, a third specimen is recommended. When 2 out of 3 results are in the microalbuminuria range, this is evidence for incipient nephropathy and warrants increased efforts at glucose control, blood pressure control, and institution of therapy with an angiotensin-converting-enzyme (ACE) inhibitor (if the patient can tolerate it).         If you have any questions or concerns, please call the clinic at the number listed above.       Sincerely,      Mendy Dorsey PA-C/ta

## 2024-10-31 NOTE — PROGRESS NOTES
Assessment/Plan :   Type 2 DM. Paul continues to work on managing his blood sugars. He has noticed that his blood sugars are more stable since he left his previous job. He feels like the stress was adversely effecting of his blood sugars. We reviewed his recent CGMS report and his blood sugars look great. I do see where his numbers have dropped overnight. We discussed the concept of compression lows and I wonder if these episodes may be, at least partly related to compression. If he continues to have overnight lows, he will contact our office. We will continue with his current medications, for now. I will send in a new prescription for the Dexcom G7 sensor. If he has any trouble switching over, he will contact our office. We will follow-up in 6 mos.      I have independently reviewed and interpreted labs, imaging as indicated.      Chief complaint:  Paul is a 54 year old male who returns for follow-up of Type 2 DM.    I have reviewed Care Everywhere including [facility], [facility], [facility], [facility], [place], [facility], [facility] , , [place] lab reports, imaging reports and provider notes as indicated.      HISTORY OF PRESENT ILLNESS  Paul is doing well. He recently  from his previous employer which was very stressful. He has been unemployed for about a month and he has noticed that his blood sugars have improved during this time. He knows that the stress from his job was adversely affecting his blood sugars. He has continued to take Ozempic 2 mg weekly, metformin 2000 mg daily, Lantus 42 unit(s) twice daily, Jardiance 25 mg daily and Novolog before meals as needed. Over the last month, he has only needed Novolog a few times.    Paul continues to use the FreeStyle Enrique 3 to monitor his blood sugars. He was recently notified that his new insurance prefers Dexcom, so he would like to switch, if possible. He has not had any recent episodes of severe hyperglycemia and/or hypoglycemia. He  has had a couple odd hypoglycemic episodes overnight. His sensor alarm alerted him and he was able to correct with a small snack. He has not had any problems with blurred vision or an increase in numbness/tingling in his feet.     Paul was diagnosed with diabetes about 15 yrs ago. During that time he has struggled to get his blood sugars under consistent control. He attributes a lot of the problem to stress. He is still under a lot of stress but he feels like the Ozempic has made a significant improvement in stabilizing his numbers. He has not needed as much Novolog, since starting Ozempic, and his blood sugars remain under good control. His diabetes is complicated by obesity, hyperlipidemia, HTN, and shoulder impingement.      Endocrine relevant labs are as follows:   Latest Reference Range & Units 10/31/24 13:52   Afinion Hemoglobin A1c POCT <=5.7 % 6.8 (H)   (H): Data is abnormally high   Latest Reference Range & Units 04/26/24 08:27   Afinion Hemoglobin A1c POCT <=5.7 % 6.7 (H)   (H): Data is abnormally high   Latest Reference Range & Units 10/20/23 08:10   Hemoglobin A1C POCT 4.3 - <5.7 % 6.8 !   !: Data is abnormal   Latest Reference Range & Units 10/20/23 08:47   Albumin Urine mg/L mg/L <12.0     REVIEW OF SYSTEMS    Endocrine: positive for diabetes and obesity  Skin: negative  Eyes: negative for, visual blurring, redness, tearing  Ears/Nose/Throat: negative  Respiratory: No shortness of breath, dyspnea on exertion, cough, or hemoptysis  Cardiovascular: negative for, chest pain, dyspnea on exertion, lower extremity edema, and exercise intolerance  Gastrointestinal: negative for, nausea, vomiting, constipation, and diarrhea  Genitourinary: negative for, nocturia, dysuria, frequency, and urgency  Musculoskeletal: negative for, muscular weakness, nocturnal cramping, and foot pain  Neurologic: negative for, local weakness, numbness or tingling of hands, and numbness or tingling of feet  Psychiatric:  negative  Hematologic/Lymphatic/Immunologic: negative    Past Medical History  Past Medical History:   Diagnosis Date    Diabetes type 2, uncontrolled     Gout     diet controlled    HTN, goal below 140/90     Hyperlipidemia     Obesity        Medications  Current Outpatient Medications   Medication Sig Dispense Refill    Ascorbic Acid (VITAMIN C PO)       aspirin 81 MG tablet Take 1 tablet by mouth daily. 30 tablet     atorvastatin (LIPITOR) 20 MG tablet Take 1 tablet (20 mg) by mouth daily 90 tablet 3    Continuous Glucose Sensor (FREESTYLE GEREMIAS 3 SENSOR) MISC 1 each every 14 days 6 each 3    empagliflozin (JARDIANCE) 25 MG TABS tablet Take 1 tablet (25 mg) by mouth daily 90 tablet 1    insulin aspart (NOVOLOG PEN) 100 UNIT/ML pen Uses up to 20 units daily for carb coverage, correction, and priming 15 mL 0    insulin glargine (LANTUS SOLOSTAR) 100 UNIT/ML pen INJECT 42 UNITS SUBCUTANEOUSLY 2 TIMES DAILY 75 mL 1    insulin lispro (HUMALOG KWIKPEN) 100 UNIT/ML (1 unit dial) KWIKPEN Take before meals based on a sliding scale. TDD 20 unit(s) 30 mL 1    insulin pen needle (B-D U/F) 31G X 5 MM miscellaneous Inject Subcutaneous 4 times daily ( 3 times daily with meals and at bedtime) 400 each 3    losartan (COZAAR) 100 MG tablet Take 1 tablet (100 mg) by mouth daily 90 tablet 1    MAGNESIUM PO       metFORMIN (GLUCOPHAGE) 1000 MG tablet Take 1 tablet (1,000 mg) by mouth 2 times daily (with meals) 180 tablet 1    Multiple Vitamins-Minerals (ZINC PO)       QUERCETIN PO       Semaglutide, 2 MG/DOSE, (OZEMPIC) 8 MG/3ML pen Inject 2 mg Subcutaneous every 7 days 9 mL 3    VITAMIN D PO          Allergies  Allergies   Allergen Reactions    Zantac          Family History  family history includes C.A.D. in his maternal grandfather; Diabetes in his father and maternal grandmother; Hypertension in his mother.    Social History  Social History     Tobacco Use    Smoking status: Former     Current packs/day: 0.00     Average  packs/day: 1.5 packs/day for 13.0 years (19.5 ttl pk-yrs)     Types: Cigarettes     Start date: 1984     Quit date: 1997     Years since quittin.2    Smokeless tobacco: Never   Vaping Use    Vaping status: Never Used   Substance Use Topics    Alcohol use: Yes     Comment: very little    Drug use: No       Physical Exam  BP (!) 127/91   Pulse 77   Wt 112.5 kg (248 lb)   SpO2 97%   BMI 35.08 kg/m    Body mass index is 35.08 kg/m .  GENERAL :  In no apparent distress  SKIN: Normal color, normal temperature, texture.  No hirsutism, alopecia or purple striae.     EYES: PERRL, EOMI, No scleral icterus,  No proptosis, conjunctival redness, stare, retraction  RESP: Lungs clear to auscultation bilaterally  CARDIAC: Regular rate and rhythm, normal S1 S2, without murmurs, rubs or gallops    NEURO: awake, alert, responds appropriately to questions.  Cranial nerves intact.   Moves all extremities; Gait normal.  No tremor of the outstretched hand.    EXTREMITIES: No clubbing, cyanosis or edema.    DATA REVIEW  [facility] LibreView  Time in target range 81%  High 18%, Very High 1%  Current Ave  mg/dl

## 2024-11-01 LAB
CREAT UR-MCNC: 99.4 MG/DL
MICROALBUMIN UR-MCNC: <12 MG/L
MICROALBUMIN/CREAT UR: NORMAL MG/G{CREAT}

## 2025-01-29 DIAGNOSIS — E11.22 TYPE 2 DIABETES MELLITUS WITH STAGE 2 CHRONIC KIDNEY DISEASE, WITHOUT LONG-TERM CURRENT USE OF INSULIN (H): ICD-10-CM

## 2025-01-29 DIAGNOSIS — E11.9 TYPE 2 DIABETES, HBA1C GOAL < 7% (H): ICD-10-CM

## 2025-01-29 DIAGNOSIS — N18.2 TYPE 2 DIABETES MELLITUS WITH STAGE 2 CHRONIC KIDNEY DISEASE, WITHOUT LONG-TERM CURRENT USE OF INSULIN (H): ICD-10-CM

## 2025-02-03 RX ORDER — ACYCLOVIR 400 MG/1
TABLET ORAL
Qty: 9 EACH | Refills: 1 | Status: SHIPPED | OUTPATIENT
Start: 2025-02-03

## 2025-02-03 NOTE — TELEPHONE ENCOUNTER
Last Written Prescription:  Continuous Glucose Sensor (DEXCOM G7 SENSOR) MISC 9 each 1 10/31/2024     ----------------------  Last Visit Date: 10/31/24  Future Visit Date: 5/2/25  ----------------------      Refill decision: Medication refilled per  Medication Refill in Ambulatory Care  policy.    Request from pharmacy:  Requested Prescriptions   Pending Prescriptions Disp Refills    Continuous Glucose Sensor (DEXCOM G7 SENSOR) MISC [Pharmacy Med Name: DEXCOM G7 SENSOR] 9 each 1     Sig: CHANGE EVERY 10 DAYS       There is no refill protocol information for this order

## 2025-04-08 ENCOUNTER — ORDERS ONLY (AUTO-RELEASED) (OUTPATIENT)
Dept: FAMILY MEDICINE | Facility: CLINIC | Age: 55
End: 2025-04-08

## 2025-04-08 ENCOUNTER — OFFICE VISIT (OUTPATIENT)
Dept: FAMILY MEDICINE | Facility: CLINIC | Age: 55
End: 2025-04-08
Payer: COMMERCIAL

## 2025-04-08 VITALS
SYSTOLIC BLOOD PRESSURE: 110 MMHG | OXYGEN SATURATION: 96 % | HEART RATE: 70 BPM | HEIGHT: 71 IN | BODY MASS INDEX: 35.34 KG/M2 | WEIGHT: 252.4 LBS | DIASTOLIC BLOOD PRESSURE: 80 MMHG | TEMPERATURE: 98.1 F | RESPIRATION RATE: 20 BRPM

## 2025-04-08 DIAGNOSIS — I10 BENIGN ESSENTIAL HYPERTENSION: ICD-10-CM

## 2025-04-08 DIAGNOSIS — E11.22 TYPE 2 DIABETES MELLITUS WITH STAGE 2 CHRONIC KIDNEY DISEASE, WITHOUT LONG-TERM CURRENT USE OF INSULIN (H): Primary | ICD-10-CM

## 2025-04-08 DIAGNOSIS — E11.22 TYPE 2 DIABETES MELLITUS WITH STAGE 2 CHRONIC KIDNEY DISEASE, WITH LONG-TERM CURRENT USE OF INSULIN (H): ICD-10-CM

## 2025-04-08 DIAGNOSIS — Z13.6 ENCOUNTER FOR SCREENING FOR CORONARY ARTERY DISEASE: ICD-10-CM

## 2025-04-08 DIAGNOSIS — Z12.11 SCREEN FOR COLON CANCER: ICD-10-CM

## 2025-04-08 DIAGNOSIS — N18.2 TYPE 2 DIABETES MELLITUS WITH STAGE 2 CHRONIC KIDNEY DISEASE, WITHOUT LONG-TERM CURRENT USE OF INSULIN (H): Primary | ICD-10-CM

## 2025-04-08 DIAGNOSIS — Z79.4 TYPE 2 DIABETES MELLITUS WITH STAGE 2 CHRONIC KIDNEY DISEASE, WITH LONG-TERM CURRENT USE OF INSULIN (H): ICD-10-CM

## 2025-04-08 DIAGNOSIS — E11.9 TYPE 2 DIABETES, HBA1C GOAL < 7% (H): ICD-10-CM

## 2025-04-08 DIAGNOSIS — N18.2 TYPE 2 DIABETES MELLITUS WITH STAGE 2 CHRONIC KIDNEY DISEASE, WITH LONG-TERM CURRENT USE OF INSULIN (H): ICD-10-CM

## 2025-04-08 DIAGNOSIS — E78.5 HYPERLIPIDEMIA LDL GOAL <100: ICD-10-CM

## 2025-04-08 LAB
CHOLEST SERPL-MCNC: 143 MG/DL
EST. AVERAGE GLUCOSE BLD GHB EST-MCNC: 140 MG/DL
FASTING STATUS PATIENT QL REPORTED: YES
HBA1C MFR BLD: 6.5 % (ref 0–5.6)
HDLC SERPL-MCNC: 44 MG/DL
LDLC SERPL CALC-MCNC: 79 MG/DL
NONHDLC SERPL-MCNC: 99 MG/DL
TRIGL SERPL-MCNC: 101 MG/DL

## 2025-04-08 PROCEDURE — 99214 OFFICE O/P EST MOD 30 MIN: CPT | Performed by: PHYSICIAN ASSISTANT

## 2025-04-08 PROCEDURE — 3074F SYST BP LT 130 MM HG: CPT | Performed by: PHYSICIAN ASSISTANT

## 2025-04-08 PROCEDURE — 83036 HEMOGLOBIN GLYCOSYLATED A1C: CPT | Performed by: PHYSICIAN ASSISTANT

## 2025-04-08 PROCEDURE — G2211 COMPLEX E/M VISIT ADD ON: HCPCS | Performed by: PHYSICIAN ASSISTANT

## 2025-04-08 PROCEDURE — 80061 LIPID PANEL: CPT | Performed by: PHYSICIAN ASSISTANT

## 2025-04-08 PROCEDURE — 36415 COLL VENOUS BLD VENIPUNCTURE: CPT | Performed by: PHYSICIAN ASSISTANT

## 2025-04-08 PROCEDURE — 3079F DIAST BP 80-89 MM HG: CPT | Performed by: PHYSICIAN ASSISTANT

## 2025-04-08 RX ORDER — ATORVASTATIN CALCIUM 20 MG/1
20 TABLET, FILM COATED ORAL DAILY
Qty: 90 TABLET | Refills: 3 | Status: SHIPPED | OUTPATIENT
Start: 2025-04-08

## 2025-04-08 RX ORDER — INSULIN GLARGINE 100 [IU]/ML
INJECTION, SOLUTION SUBCUTANEOUS
Qty: 75 ML | Refills: 1 | Status: SHIPPED | OUTPATIENT
Start: 2025-04-08

## 2025-04-08 RX ORDER — LOSARTAN POTASSIUM 100 MG/1
100 TABLET ORAL DAILY
Qty: 90 TABLET | Refills: 1 | Status: SHIPPED | OUTPATIENT
Start: 2025-04-08

## 2025-04-08 NOTE — PROGRESS NOTES
Jazz Miller is a 55 year old, presenting for the following health issues:  Hypertension, Hyperlipidemia, Diabetes, and Health Maintenance (Patient declined vaccines today)        4/8/2025     7:06 AM   Additional Questions   Roomed by Nerissa Gabriel CMA   Accompanied by None         4/8/2025     7:06 AM   Patient Reported Additional Medications   Patient reports taking the following new medications none     Via the Health Maintenance questionnaire, the patient has reported the following services have been completed -Eye Exam: [facility] 2024-10-02, this information has been sent to the abstraction team.  History of Present Illness       Hyperlipidemia:  He presents for follow up of hyperlipidemia.   He is taking medication to lower cholesterol. He is not having myalgia or other side effects to statin medications.    He eats 0-1 servings of fruits and vegetables daily.He consumes 1 sweetened beverage(s) daily.He exercises with enough effort to increase his heart rate 9 or less minutes per day.  He exercises with enough effort to increase his heart rate 3 or less days per week.   He is taking medications regularly.        Diabetes Follow-up    How often are you checking your blood sugar? Continuous glucose monitor  What time of day are you checking your blood sugars (select all that apply)?   continuous  Have you had any blood sugars above 200?  Yes   Have you had any blood sugars below 70?  Yes   What symptoms do you notice when your blood sugar is low?  Shaky and racing heart beat  What concerns do you have today about your diabetes? None   Do you have any of these symptoms? (Select all that apply)  No numbness or tingling in feet.  No redness, sores or blisters on feet.  No complaints of excessive thirst.  No reports of blurry vision.  No significant changes to weight.      BP Readings from Last 2 Encounters:   04/08/25 110/80   10/31/24 (!) 127/91     Hemoglobin A1C (%)   Date Value   10/04/2022 10.3  "(H)   06/09/2022 9.1 (H)   04/14/2021 7.5 (H)   04/08/2021 7.9 (A)     Afinion Hemoglobin A1c POCT (%)   Date Value   10/31/2024 6.8 (H)   04/26/2024 6.7 (H)     LDL Cholesterol Calculated (mg/dL)   Date Value   08/19/2024 63   10/04/2022 60   01/04/2021 71   07/28/2020 95             Hypertension Follow-up    Do you check your blood pressure regularly outside of the clinic? No   Are you following a low salt diet? Yes  Are your blood pressures ever more than 140 on the top number (systolic) OR more   than 90 on the bottom number (diastolic), for example 140/90? N/A    BP Readings from Last 2 Encounters:   04/08/25 110/80   10/31/24 (!) 127/91     No chest pain/sob/palpitations/dizziness/ha's  No vision changes.  No neuropathy concerns.      Review of Systems  Constitutional, HEENT, cardiovascular, pulmonary, GI, , musculoskeletal, neuro, skin, endocrine and psych systems are negative, except as otherwise noted.      Objective    /80   Pulse 70   Temp 98.1  F (36.7  C) (Oral)   Resp 20   Ht 1.791 m (5' 10.5\")   Wt 114.5 kg (252 lb 6.4 oz)   SpO2 96%   BMI 35.70 kg/m    Body mass index is 35.7 kg/m .  Physical Exam   Diabetic foot exam: normal DP and PT pulses, no trophic changes or ulcerative lesions, normal sensory exam, and normal monofilament exam   Eye exam - right eye normal lid, conjunctiva, cornea, pupil and fundus, left eye normal lid, conjunctiva, cornea, pupil and fundus.  Thyroid not palpable, not enlarged, no nodules detected.  CHEST:chest clear to IPPA, no tachypnea, retractions or cyanosis, and S1, S2 normal, no murmur, no gallop, rate regular.  Foot exam - both sides normal; no swelling, tenderness or skin or vascular lesions. Color and temperature is normal. Sensation is intact. Peripheral pulses are palpable. Toenails are normal.    Paul was seen today for hypertension, hyperlipidemia, diabetes and health maintenance.    Diagnoses and all orders for this visit:    Type 2 diabetes " mellitus with stage 2 chronic kidney disease, without long-term current use of insulin (H)  -     FOOT EXAM  -     Hemoglobin A1c; Future  -     Hemoglobin A1c  -     metFORMIN (GLUCOPHAGE) 1000 MG tablet; Take 1 tablet (1,000 mg) by mouth 2 times daily (with meals).  -     Semaglutide, 2 MG/DOSE, (OZEMPIC) 8 MG/3ML pen; Inject 2 mg subcutaneously every 7 days.  -     insulin glargine (LANTUS SOLOSTAR) 100 UNIT/ML pen; INJECT 42 UNITS SUBCUTANEOUSLY 2 TIMES DAILY  -     empagliflozin (JARDIANCE) 25 MG TABS tablet; Take 1 tablet (25 mg) by mouth daily.    Screen for colon cancer  -     COLOGUARD([employer]); Future    Benign essential hypertension  -     losartan (COZAAR) 100 MG tablet; Take 1 tablet (100 mg) by mouth daily.    Encounter for screening for coronary artery disease  -     CT Coronary Calcium Scan; Future    Type 2 diabetes mellitus with stage 2 chronic kidney disease, with long-term current use of insulin (H)  -     Lipid panel reflex to direct LDL Non-fasting    Hyperlipidemia LDL goal <100  -     atorvastatin (LIPITOR) 20 MG tablet; Take 1 tablet (20 mg) by mouth daily.    Type 2 diabetes, HbA1c goal < 7% (H)  -     insulin aspart (NOVOLOG PEN) 100 UNIT/ML pen; Uses up to 20 units daily for carb coverage, correction, and priming    Other orders  -     REVIEW OF HEALTH MAINTENANCE PROTOCOL ORDERS      Continue to work on a lower sugar/starch diet and more exercise.  Lower fat, higher fiber diet and consistent exercise.  Recheck in 6 mos  Continue all meds.         Signed Electronically by: Dominik Thomas PA-C

## 2025-05-20 NOTE — PROGRESS NOTES
Outcome for 05/20/25 2:05 PM: Device upload instructions sent to patient via Mychart -Dexcom G7  Filomena Quezada CMA  Adult Endocrinology  [facility], [place]

## 2025-05-22 ENCOUNTER — OFFICE VISIT (OUTPATIENT)
Dept: ENDOCRINOLOGY | Facility: CLINIC | Age: 55
End: 2025-05-22
Payer: COMMERCIAL

## 2025-05-22 VITALS
DIASTOLIC BLOOD PRESSURE: 88 MMHG | WEIGHT: 252 LBS | BODY MASS INDEX: 35.65 KG/M2 | SYSTOLIC BLOOD PRESSURE: 124 MMHG | RESPIRATION RATE: 16 BRPM | OXYGEN SATURATION: 95 % | HEART RATE: 70 BPM

## 2025-05-22 DIAGNOSIS — E11.9 TYPE 2 DIABETES, HBA1C GOAL < 7% (H): ICD-10-CM

## 2025-05-22 DIAGNOSIS — N18.2 TYPE 2 DIABETES MELLITUS WITH STAGE 2 CHRONIC KIDNEY DISEASE, WITHOUT LONG-TERM CURRENT USE OF INSULIN (H): ICD-10-CM

## 2025-05-22 DIAGNOSIS — E11.22 TYPE 2 DIABETES MELLITUS WITH STAGE 2 CHRONIC KIDNEY DISEASE, WITHOUT LONG-TERM CURRENT USE OF INSULIN (H): ICD-10-CM

## 2025-05-22 RX ORDER — ACYCLOVIR 400 MG/1
TABLET ORAL
Qty: 9 EACH | Refills: 1 | Status: SHIPPED | OUTPATIENT
Start: 2025-05-22

## 2025-05-22 NOTE — PATIENT INSTRUCTIONS
Welcome to the [facility]     Our Endocrinology Clinics are here to provide you with a team-based, collaborative approach in the diagnosis and treatment of patients with diabetes and endocrine disorders. The team is made up of Physicians, Physician Assistants, Certified Diabetes Educators, Registered Nurses, Medical Assistants, Emergency Medical Technicians, and many others, all of whom have the unified goal of providing our patients with high quality care.     Please see below for some helpful tips to best navigate and use the [facility]:     Mora Respect: At [facility], we are committed to a respectful and safe space for all patients, visitors, and staff.  We believe that mutual respect between patients and their care team is the foundation of quality care.  It is our expectation that you will be treated with respect by your care team.  In turn, we ask that all communication with the care team (written and verbal) be respectful and free from profanity, threatening, or abusive language.  Disrespectful communication undermines our therapeutic relationship with you and may result in us being unable to continue to provide your care.    Refills: A provider must see you at least annually to prescribe and refill medications. This is to ensure your safety as well as meet insurance and compliance regulations.    Scheduling: Many of our Providers offer both in-person or video visits. Please call to schedule any needed follow ups as soon as possible because our provider schedules fill up very quickly. Our care team has the right to require an in-person visit when they believe that it is medically necessary. Please remember that for any virtual visits, you must be in the [facility] at the time of the visit, otherwise we are unable to see you and you will need to be rescheduled.    Missed Appointments: If you need to cancel or miss your  scheduled appointment, please call the clinic at [phone] to reschedule.  Please note if you repeatedly miss appointments or repeatedly miss appointments without calling to inform us ahead of time (no-show), the clinic may elect to not allow you to reschedule without speaking to a manager, may require a Partnership In Care Agreement prior to rescheduling, or could result in you no longer being able to receive care from the clinic. Providing the clinic with timely notification if you have to miss an appointment, allows us to better serve the needs of all of our patients.    Primary Care Provider: Our Endocrinologists are Specialists in their field. We expect you to have a Primary Care Provider established to handle any needs outside of your diabetes and endocrine care.  We would be happy to assist you find a Primary Care Provider, if you do not have one.    [employer]: [employer] is a wonderful resource that allows you access to your Care Team via online or the sravan. Please ask a member of the team if you would like help creating an account. Please note that it may take up to 2 business days for a response. [employer] messages are not reviewed on weekends or after business hours.  Emergent or urgent care needs should never be communicated via [employer].  If you experience a medical emergency call 911 or go to the nearest emergency room.    Labs: It is recommended that you stay within the [facility] for labs but you are welcome to obtain ordered labs (with some exceptions) from any location of your choice as long as they are able to complete and process the needed labs. If you need us to fax orders to your preferred lab, please provide us the name and fax number of the lab you would like to go to so we can fax the orders. If your labs are drawn outside of the [facility], please have them fax the results to [phone] ([place]) or [phone] ([place]) or via [facility]. It is your  responsibility to ensure that outside lab results are sent to us.    We look forward to working with you. Please do not hesitate to reach out with any questions.    Thank you,    The Endocrine Team    [facility] Address:   [place] Address:     [place] [address]    Phone: [phone]  Fax: [phone]   [address] [place] [address]    Phone: [phone]  Fax: [phone]     [facility] [place] Cost Estimate Phone Number: [phone]    General Lab and Imaging Scheduling Phone Number: [phone]      If you are interested in exploring research opportunities in the Division of Diabetes, Endocrinology and Metabolism and within the [facility] you are welcome to view the following QR codes by scanning them using your phone's camera to access a link to more information.  Participating in a research study is voluntary. Your decision whether or not to participate will not affect your current or future relations with the [facility] or [facility]. Current available studies are:     Type 1 Diabetes  Adults     Pediatrics     Type 2 Diabetes  Weight Loss - People Treated with Diet or Metformin Only     Pediatrics and Young Adults

## 2025-05-22 NOTE — LETTER
5/22/2025      Paul Ortiz  [address] [id number]      Dear Colleague,    Thank you for referring your patient, Paul Ortiz, to the [facility]. Please see a copy of my visit note below.    Outcome for 05/20/25 2:05 PM: Device upload instructions sent to patient via [employer] -Dexcom G7  Filomena Quezada CMA  Adult Endocrinology  [facility], [place]      Assessment/Plan :   Type 2 DM. Paul feels like things are going well. We reviewed his recent CGMS report and we discussed his current medications. I do not see any reason to make adjustments to his current medications. We did discuss the difference between Ozempic and Mounjaro. He does not want to make any changes today but, if his blood sugars start to increase, he may be willing to discuss changes in follow-up. He is up to date with laboratory testing, as well. We will follow-up in 6 mos.       I have independently reviewed and interpreted labs, imaging as indicated.      Chief complaint:  Paul is a 55 year old male who returns for follow-up of type 2 diabetes    I have reviewed Care Everywhere including [facility], Count includes the [facility], [facility], [facility], [facility], [facility] , [facility], [place] lab reports, imaging reports and provider notes as indicated.      HISTORY OF PRESENT ILLNESS  Paul is doing okay. He feels like things are stable. He does have the occasional post-meal blood sugar spike. He feels like this usually occurs when he is traveling. He travels a lot for work and it is always a struggle to find healthy food. He has continued to take metformin 2000 mg daily, Jardiance 25 mg daily, Lantus 42 unit(s) twice daily, Novolog as needed before meals and Ozempic 2 mg weekly. He feels like this combination works really well and he is not wanting to switch up medication at this time.    He has also continued to monitor his blood sugars closely with the Dexcom G7 sensor. He has not had any  problems with severe hyperglycemia and/or hypoglycemia. He has struggled with hyperglycemia over the last few weeks. Again, he attributes this mostly to his work and travel. He has not had any problems with adverse effects related to his medication. He also has not noticed any issues with worsening vision or an increase in numbness/tingling in his feet.     Paul was diagnosed with diabetes about 15 yrs ago. During that time he has struggled to get his blood sugars under consistent control. He attributes a lot of the problem to stress. He is still under a lot of stress but he feels like the Ozempic has made a significant improvement in stabilizing his numbers. He has not needed as much Novolog, since starting Ozempic, and his blood sugars remain under good control. His diabetes is complicated by obesity, hyperlipidemia, HTN, and shoulder impingement.     Endocrine relevant labs are as follows:   Latest Reference Range & Units 04/08/25 07:45   Hemoglobin A1C 0.0 - 5.6 % 6.5 (H)   (H): Data is abnormally high   Latest Reference Range & Units 10/31/24 14:41   Albumin Urine mg/L mg/L <12.0      Latest Reference Range & Units 10/31/24 13:52   Afinion Hemoglobin A1c POCT <=5.7 % 6.8 (H)   (H): Data is abnormally high    REVIEW OF SYSTEMS    10 system ROS otherwise as per the HPI or negative    Past Medical History  Past Medical History:   Diagnosis Date     Diabetes type 2, uncontrolled      Gout     diet controlled     HTN, goal below 140/90      Hyperlipidemia      Obesity        Medications  Current Outpatient Medications   Medication Sig Dispense Refill     Ascorbic Acid (VITAMIN C PO)        aspirin 81 MG tablet Take 1 tablet by mouth daily. 30 tablet      atorvastatin (LIPITOR) 20 MG tablet Take 1 tablet (20 mg) by mouth daily. 90 tablet 3     Continuous Glucose Sensor (DEXCOM G7 SENSOR) MISC Change every 10 days. 9 each 1     empagliflozin (JARDIANCE) 25 MG TABS tablet Take 1 tablet (25 mg) by mouth daily. 90  tablet 1     insulin aspart (NOVOLOG PEN) 100 UNIT/ML pen Uses up to 20 units daily for carb coverage, correction, and priming 30 mL 2     insulin glargine (LANTUS SOLOSTAR) 100 UNIT/ML pen INJECT 42 UNITS SUBCUTANEOUSLY 2 TIMES DAILY 75 mL 1     insulin pen needle (B-D U/F) 31G X 5 MM miscellaneous Inject subcutaneously 4 times daily. ( 3 times daily with meals and at bedtime) 400 each 3     losartan (COZAAR) 100 MG tablet Take 1 tablet (100 mg) by mouth daily. 90 tablet 1     MAGNESIUM PO        metFORMIN (GLUCOPHAGE) 1000 MG tablet Take 1 tablet (1,000 mg) by mouth 2 times daily (with meals). 180 tablet 1     Multiple Vitamins-Minerals (ZINC PO)        QUERCETIN PO        Semaglutide, 2 MG/DOSE, (OZEMPIC) 8 MG/3ML pen Inject 2 mg subcutaneously every 7 days. 9 mL 3     VITAMIN D PO          Allergies  Allergies   Allergen Reactions     Zantac          Family History  family history includes C.A.D. in his maternal grandfather; Diabetes in his father and maternal grandmother; Hypertension in his mother.    Social History  Social History     Tobacco Use     Smoking status: Former     Current packs/day: 0.00     Average packs/day: 1.5 packs/day for 13.0 years (19.5 ttl pk-yrs)     Types: Cigarettes     Start date: 1984     Quit date: 1997     Years since quittin.8     Smokeless tobacco: Never   Vaping Use     Vaping status: Never Used   Substance Use Topics     Alcohol use: Yes     Comment: very little     Drug use: No       Physical Exam  /88   Pulse 70   Resp 16   Wt 114.3 kg (252 lb)   SpO2 95%   BMI 35.65 kg/m    Body mass index is 35.65 kg/m .  GENERAL :  In no apparent distress  SKIN: Normal color, normal temperature, texture.  No hirsutism, alopecia or purple striae.     EYES: PERRL, EOMI, No scleral icterus,  No proptosis, conjunctival redness, stare, retraction  RESP: Lungs clear to auscultation bilaterally  CARDIAC: Regular rate and rhythm, normal S1 S2, without murmurs, rubs or  gallops  NEURO: awake, alert, responds appropriately to questions.  Cranial nerves intact.   Moves all extremities; Gait normal.  No tremor of the outstretched hand.    EXTREMITIES: No clubbing, cyanosis or edema.    DATA REVIEW  Dexcom Clarity  Time in target range 80%  High 19%, Very High 1%  Current Ave  mg/dl        Again, thank you for allowing me to participate in the care of your patient.        Sincerely,        Mendy Dorsey PA-C    Electronically signed

## 2025-05-22 NOTE — PROGRESS NOTES
Assessment/Plan :   Type 2 DM. Paul feels like things are going well. We reviewed his recent CGMS report and we discussed his current medications. I do not see any reason to make adjustments to his current medications. We did discuss the difference between Ozempic and Mounjaro. He does not want to make any changes today but, if his blood sugars start to increase, he may be willing to discuss changes in follow-up. He is up to date with laboratory testing, as well. We will follow-up in 6 mos.       I have independently reviewed and interpreted labs, imaging as indicated.      Chief complaint:  Paul is a 55 year old male who returns for follow-up of type 2 diabetes    I have reviewed Care Everywhere including [facility], [facility], [facility], [facility], [facility], [facility] , [facility], [place] lab reports, imaging reports and provider notes as indicated.      HISTORY OF PRESENT ILLNESS  Paul is doing okay. He feels like things are stable. He does have the occasional post-meal blood sugar spike. He feels like this usually occurs when he is traveling. He travels a lot for work and it is always a struggle to find healthy food. He has continued to take metformin 2000 mg daily, Jardiance 25 mg daily, Lantus 42 unit(s) twice daily, Novolog as needed before meals and Ozempic 2 mg weekly. He feels like this combination works really well and he is not wanting to switch up medication at this time.    He has also continued to monitor his blood sugars closely with the Dexcom G7 sensor. He has not had any problems with severe hyperglycemia and/or hypoglycemia. He has struggled with hyperglycemia over the last few weeks. Again, he attributes this mostly to his work and travel. He has not had any problems with adverse effects related to his medication. He also has not noticed any issues with worsening vision or an increase in numbness/tingling in his feet.     Paul was diagnosed with diabetes about 15 yrs ago.  During that time he has struggled to get his blood sugars under consistent control. He attributes a lot of the problem to stress. He is still under a lot of stress but he feels like the Ozempic has made a significant improvement in stabilizing his numbers. He has not needed as much Novolog, since starting Ozempic, and his blood sugars remain under good control. His diabetes is complicated by obesity, hyperlipidemia, HTN, and shoulder impingement.     Endocrine relevant labs are as follows:   Latest Reference Range & Units 04/08/25 07:45   Hemoglobin A1C 0.0 - 5.6 % 6.5 (H)   (H): Data is abnormally high   Latest Reference Range & Units 10/31/24 14:41   Albumin Urine mg/L mg/L <12.0      Latest Reference Range & Units 10/31/24 13:52   Afinion Hemoglobin A1c POCT <=5.7 % 6.8 (H)   (H): Data is abnormally high    REVIEW OF SYSTEMS    10 system ROS otherwise as per the HPI or negative    Past Medical History  Past Medical History:   Diagnosis Date    Diabetes type 2, uncontrolled     Gout     diet controlled    HTN, goal below 140/90     Hyperlipidemia     Obesity        Medications  Current Outpatient Medications   Medication Sig Dispense Refill    Ascorbic Acid (VITAMIN C PO)       aspirin 81 MG tablet Take 1 tablet by mouth daily. 30 tablet     atorvastatin (LIPITOR) 20 MG tablet Take 1 tablet (20 mg) by mouth daily. 90 tablet 3    Continuous Glucose Sensor (DEXCOM G7 SENSOR) MISC Change every 10 days. 9 each 1    empagliflozin (JARDIANCE) 25 MG TABS tablet Take 1 tablet (25 mg) by mouth daily. 90 tablet 1    insulin aspart (NOVOLOG PEN) 100 UNIT/ML pen Uses up to 20 units daily for carb coverage, correction, and priming 30 mL 2    insulin glargine (LANTUS SOLOSTAR) 100 UNIT/ML pen INJECT 42 UNITS SUBCUTANEOUSLY 2 TIMES DAILY 75 mL 1    insulin pen needle (B-D U/F) 31G X 5 MM miscellaneous Inject subcutaneously 4 times daily. ( 3 times daily with meals and at bedtime) 400 each 3    losartan (COZAAR) 100 MG tablet  Take 1 tablet (100 mg) by mouth daily. 90 tablet 1    MAGNESIUM PO       metFORMIN (GLUCOPHAGE) 1000 MG tablet Take 1 tablet (1,000 mg) by mouth 2 times daily (with meals). 180 tablet 1    Multiple Vitamins-Minerals (ZINC PO)       QUERCETIN PO       Semaglutide, 2 MG/DOSE, (OZEMPIC) 8 MG/3ML pen Inject 2 mg subcutaneously every 7 days. 9 mL 3    VITAMIN D PO          Allergies  Allergies   Allergen Reactions    Zantac          Family History  family history includes C.A.D. in his maternal grandfather; Diabetes in his father and maternal grandmother; Hypertension in his mother.    Social History  Social History     Tobacco Use    Smoking status: Former     Current packs/day: 0.00     Average packs/day: 1.5 packs/day for 13.0 years (19.5 ttl pk-yrs)     Types: Cigarettes     Start date: 1984     Quit date: 1997     Years since quittin.8    Smokeless tobacco: Never   Vaping Use    Vaping status: Never Used   Substance Use Topics    Alcohol use: Yes     Comment: very little    Drug use: No       Physical Exam  /88   Pulse 70   Resp 16   Wt 114.3 kg (252 lb)   SpO2 95%   BMI 35.65 kg/m    Body mass index is 35.65 kg/m .  GENERAL :  In no apparent distress  SKIN: Normal color, normal temperature, texture.  No hirsutism, alopecia or purple striae.     EYES: PERRL, EOMI, No scleral icterus,  No proptosis, conjunctival redness, stare, retraction  RESP: Lungs clear to auscultation bilaterally  CARDIAC: Regular rate and rhythm, normal S1 S2, without murmurs, rubs or gallops  NEURO: awake, alert, responds appropriately to questions.  Cranial nerves intact.   Moves all extremities; Gait normal.  No tremor of the outstretched hand.    EXTREMITIES: No clubbing, cyanosis or edema.    DATA REVIEW  Dexcom Clarity  Time in target range 80%  High 19%, Very High 1%  Current Ave  mg/dl

## 2025-05-22 NOTE — NURSING NOTE
Paul Ortiz's goals for this visit include:   Chief Complaint   Patient presents with    Follow Up    Diabetes       He requests these members of his care team be copied on today's visit information: yes    PCP: Dominik Thomas    Referring Provider:  Referred Self, MD  No address on file    /88   Pulse 70   Resp 16   Wt 114.3 kg (252 lb)   SpO2 95%   BMI 35.65 kg/m      Do you need any medication refills at today's visit? Unsure    Parmjit Urrutia, EMT

## 2025-06-22 DIAGNOSIS — E11.22 TYPE 2 DIABETES MELLITUS WITH STAGE 2 CHRONIC KIDNEY DISEASE, WITHOUT LONG-TERM CURRENT USE OF INSULIN (H): ICD-10-CM

## 2025-06-22 DIAGNOSIS — N18.2 TYPE 2 DIABETES MELLITUS WITH STAGE 2 CHRONIC KIDNEY DISEASE, WITHOUT LONG-TERM CURRENT USE OF INSULIN (H): ICD-10-CM

## 2025-06-25 RX ORDER — EMPAGLIFLOZIN 25 MG/1
25 TABLET, FILM COATED ORAL DAILY
Qty: 90 TABLET | Refills: 1 | OUTPATIENT
Start: 2025-06-25

## 2025-06-25 NOTE — TELEPHONE ENCOUNTER
empagliflozin (JARDIANCE) 25 MG TABS tablet   90 tablet 1 4/8/2025 -- No  Sig - Route: Take 1 tablet (25 mg) by mouth daily. - Oral  [id number] - [place] - [address] [place] [address] AT [address] & [facility]

## 2025-07-13 ENCOUNTER — HEALTH MAINTENANCE LETTER (OUTPATIENT)
Age: 55
End: 2025-07-13

## 2025-07-18 ENCOUNTER — ANCILLARY PROCEDURE (OUTPATIENT)
Dept: GENERAL RADIOLOGY | Facility: CLINIC | Age: 55
End: 2025-07-18
Attending: PHYSICIAN ASSISTANT
Payer: COMMERCIAL

## 2025-07-18 PROCEDURE — 73562 X-RAY EXAM OF KNEE 3: CPT | Mod: TC | Performed by: RADIOLOGY

## 2025-07-19 ENCOUNTER — OFFICE VISIT (OUTPATIENT)
Dept: ORTHOPEDICS | Facility: CLINIC | Age: 55
End: 2025-07-19
Payer: COMMERCIAL

## 2025-07-19 VITALS — WEIGHT: 252 LBS | BODY MASS INDEX: 35.65 KG/M2

## 2025-07-19 DIAGNOSIS — M25.561 ACUTE PAIN OF RIGHT KNEE: ICD-10-CM

## 2025-07-19 DIAGNOSIS — M17.11 PRIMARY LOCALIZED OSTEOARTHRITIS OF RIGHT KNEE: Primary | ICD-10-CM

## 2025-07-19 PROCEDURE — 99244 OFF/OP CNSLTJ NEW/EST MOD 40: CPT | Performed by: STUDENT IN AN ORGANIZED HEALTH CARE EDUCATION/TRAINING PROGRAM

## 2025-07-19 RX ORDER — MELOXICAM 15 MG/1
15 TABLET ORAL DAILY
Qty: 30 TABLET | Refills: 0 | Status: SHIPPED | OUTPATIENT
Start: 2025-07-19 | End: 2025-08-18

## 2025-07-19 NOTE — PROGRESS NOTES
ASSESSMENT & PLAN    Paul was seen today for pain.    Diagnoses and all orders for this visit:    Primary localized osteoarthritis of right knee  -     meloxicam (MOBIC) 15 MG tablet; Take 1 tablet (15 mg) by mouth daily.  -     Physical Therapy  Referral; Future    Acute pain of right knee        55 year old male presents with worsening right knee pain over the past week.  He has a remote history of patellar dislocation on the side.  Pain is primarily over his anterior knee and deep to his patella.  He has significant pain with patellar grind today and pain over his superior patella/distal quad where he has a chronic enthesophyte.  He does have a history of gout, but denies redness, swelling, or other classic symptoms.  Discussed this is most consistent with a osteoarthritis flare and we will start with some anti-inflammatories and therapy.  Could consider corticosteroid injection if no improvement.      Plan:  -Start Meloxicam daily with food for the next 7 days then as needed  -Can try knee compression sleeve as needed   -Start PT and home exercise program   -Tylenol Extra Strength (1000mg) up to three times daily as needed for pain  -Ice as needed for pain, up to 15-20 mins at a time, 3x daily  -Follow-up in 2 weeks for reevaluation before work trip, consider corticosteroid injection    Dr. Kaylie Payton, DO, CAQ  [facility]  Sports Medicine     -----  Chief Complaint   Patient presents with    Right Knee - Pain       SUBJECTIVE  Paul Ortiz is a/an 55 year old male who is seen in consultation at the request of  Trini Pierce PA-C for evaluation of right knee pain. This started 6 day(s) ago, with insidious onset. Pain is located over the anterior knee, just under the patella. Symptoms are worsened by movement, deep flexion. He has tried medications: (naproxen) and ice. Associated symptoms include: swelling and feeling of instability.    The patient is seen  alone    Prior injury/Surgical history of affected joint: History of patellar dislocation x 25 years. Notes some patellar instability without dislocation since  Social History/Occupation: Desk work, traveling the next few weeks, combination of driving and flying    REVIEW OF SYSTEMS:  Pertinent positives/negative: As stated above in HPI    OBJECTIVE:  Wt 114.3 kg (252 lb)   BMI 35.65 kg/m     General: Alert and in no distress  CV: Extremities appear well perfused   Resp: normal respiratory effort  MSK:  RIGHT KNEE  Inspection:    Normal alignment; no edema, erythema, or ecchymosis present  Palpation:    Tender about the medial patellar facet and quadriceps insertion. Remainder of bony and ligamentous landmarks are nontender.    Trace effusion is present    Patellofemoral crepitus is Present  Range of Motion:     00 extension, flexion limited by pain and apprehension today  Strength:    Extensor mechanism intact, but painful with knee extension limiting strength  Special Tests:    Positive: Patellar grind    Negative: Valgus stress (0 and 30), Varus stress (0 and 30), and Lachman       RADIOLOGY:  Final results and radiologist's interpretation available in the [facility] health record.  Images below were personally reviewed and discussed with the patient in the office today.  My personal interpretation of the performed imaging: X-ray of the right knee from 7/18/2025 reveals medial and patellofemoral compartment narrowing with osteophytes, superior patellar enthesophyte, no fractures                 Disclaimer: This note consists of text and symbols derived from dictation and/or voice recognition software. As a result, there may be errors in the script that have gone undetected. Please consider this when interpreting information found in this chart.

## 2025-07-19 NOTE — LETTER
7/19/2025      Paul Ortiz  [address] [facility] [facility]  [place] [address]      Dear Colleague,    Thank you for referring your patient, Paul Ortiz, to the [facility] [place]. Please see a copy of my visit note below.    ASSESSMENT & PLAN    Paul was seen today for pain.    Diagnoses and all orders for this visit:    Primary localized osteoarthritis of right knee  -     meloxicam (MOBIC) 15 MG tablet; Take 1 tablet (15 mg) by mouth daily.  -     Physical Therapy  Referral; Future    Acute pain of right knee        55 year old male presents with worsening right knee pain over the past week.  He has a remote history of patellar dislocation on the side.  Pain is primarily over his anterior knee and deep to his patella.  He has significant pain with patellar grind today and pain over his superior patella/distal quad where he has a chronic enthesophyte.  He does have a history of gout, but denies redness, swelling, or other classic symptoms.  Discussed this is most consistent with a osteoarthritis flare and we will start with some anti-inflammatories and therapy.  Could consider corticosteroid injection if no improvement.      Plan:  -Start Meloxicam daily with food for the next 7 days then as needed  -Can try knee compression sleeve as needed   -Start PT and home exercise program   -Tylenol Extra Strength (1000mg) up to three times daily as needed for pain  -Ice as needed for pain, up to 15-20 mins at a time, 3x daily  -Follow-up in 2 weeks for reevaluation before work trip, consider corticosteroid injection    Dr. Kaylie Payton, DO, CAQ  [facility]  Sports Medicine     -----  Chief Complaint   Patient presents with     Right Knee - Pain       SUBJECTIVE  Paul Ortiz is a/an 55 year old male who is seen in consultation at the request of  Trini Pierce PA-C for evaluation of right knee pain. This started 6 day(s) ago, with  insidious onset. Pain is located over the anterior knee, just under the patella. Symptoms are worsened by movement, deep flexion. He has tried medications: (naproxen) and ice. Associated symptoms include: swelling and feeling of instability.    The patient is seen alone    Prior injury/Surgical history of affected joint: History of patellar dislocation x 25 years. Notes some patellar instability without dislocation since  Social History/Occupation: Desk work, traveling the next few weeks, combination of driving and flying    REVIEW OF SYSTEMS:  Pertinent positives/negative: As stated above in HPI    OBJECTIVE:  Wt 114.3 kg (252 lb)   BMI 35.65 kg/m     General: Alert and in no distress  CV: Extremities appear well perfused   Resp: normal respiratory effort  MSK:  RIGHT KNEE  Inspection:    Normal alignment; no edema, erythema, or ecchymosis present  Palpation:    Tender about the medial patellar facet and quadriceps insertion. Remainder of bony and ligamentous landmarks are nontender.    Trace effusion is present    Patellofemoral crepitus is Present  Range of Motion:     00 extension, flexion limited by pain and apprehension today  Strength:    Extensor mechanism intact, but painful with knee extension limiting strength  Special Tests:    Positive: Patellar grind    Negative: Valgus stress (0 and 30), Varus stress (0 and 30), and Lachman       RADIOLOGY:  Final results and radiologist's interpretation available in the [facility] health record.  Images below were personally reviewed and discussed with the patient in the office today.  My personal interpretation of the performed imaging: X-ray of the right knee from 7/18/2025 reveals medial and patellofemoral compartment narrowing with osteophytes, superior patellar enthesophyte, no fractures                 Disclaimer: This note consists of text and symbols derived from dictation and/or voice recognition software. As a result, there may be errors in the script that have  gone undetected. Please consider this when interpreting information found in this chart.        Again, thank you for allowing me to participate in the care of your patient.        Sincerely,        Kaylie Payton, DO    Electronically signed

## 2025-07-19 NOTE — PATIENT INSTRUCTIONS
1. Primary localized osteoarthritis of right knee    2. Acute pain of right knee        Plan:  -Start Meloxicam daily with food for the next 7 days then as needed  -Can try knee compression sleeve as needed   -Start PT and home exercise program   -Tylenol Extra Strength (1000mg) up to three times daily as needed for pain  -Ice as needed for pain, up to 15-20 mins at a time, 3x daily  -Follow-up in 2 weeks for reevaluation before work trip, consider corticosteroid injection    If you had imaging performed/ordered at your appointment today, you can expect to see your radiology results in [employer] within approximately 48 business hours.     If you have questions/concerns after your appointment, please send my team a [employer] message or call the clinic at [phone].     Dr. Kaylie Payton, , [facility]  Sports Medicine and Orthopedics    Dr. Payton's Clinic Locations and Contact Numbers:   [place] APPOINTMENTS: [phone]      [address] [facility] RADIOLOGY: [phone]   [place] [address] PHYSICAL THERAPY: [phone]    HAND THERAPY/OT: [phone]   [place] BILLING QUESTIONS: [phone] [address] FAX: [phone]   [place] [address]

## 2025-07-21 ENCOUNTER — PATIENT OUTREACH (OUTPATIENT)
Dept: CARE COORDINATION | Facility: CLINIC | Age: 55
End: 2025-07-21
Payer: COMMERCIAL

## 2025-07-23 ENCOUNTER — PATIENT OUTREACH (OUTPATIENT)
Dept: CARE COORDINATION | Facility: CLINIC | Age: 55
End: 2025-07-23
Payer: COMMERCIAL

## 2025-08-02 ENCOUNTER — OFFICE VISIT (OUTPATIENT)
Dept: ORTHOPEDICS | Facility: CLINIC | Age: 55
End: 2025-08-02
Payer: COMMERCIAL

## 2025-08-02 VITALS — WEIGHT: 252 LBS | BODY MASS INDEX: 35.28 KG/M2 | HEIGHT: 71 IN

## 2025-08-02 DIAGNOSIS — M25.561 ACUTE PAIN OF RIGHT KNEE: ICD-10-CM

## 2025-08-02 DIAGNOSIS — M17.11 PRIMARY OSTEOARTHRITIS OF RIGHT KNEE: ICD-10-CM

## 2025-08-02 PROCEDURE — 99213 OFFICE O/P EST LOW 20 MIN: CPT | Performed by: PEDIATRICS

## 2025-08-28 DIAGNOSIS — I10 BENIGN ESSENTIAL HYPERTENSION: ICD-10-CM

## 2025-08-28 RX ORDER — LOSARTAN POTASSIUM 100 MG/1
100 TABLET ORAL DAILY
Qty: 90 TABLET | Refills: 1 | OUTPATIENT
Start: 2025-08-28